# Patient Record
Sex: FEMALE | NOT HISPANIC OR LATINO | Employment: UNEMPLOYED | URBAN - METROPOLITAN AREA
[De-identification: names, ages, dates, MRNs, and addresses within clinical notes are randomized per-mention and may not be internally consistent; named-entity substitution may affect disease eponyms.]

---

## 2019-05-14 ENCOUNTER — OFFICE VISIT (OUTPATIENT)
Dept: FAMILY MEDICINE | Facility: CLINIC | Age: 39
End: 2019-05-14
Payer: COMMERCIAL

## 2019-05-14 VITALS
HEART RATE: 88 BPM | HEIGHT: 64 IN | WEIGHT: 130 LBS | BODY MASS INDEX: 22.2 KG/M2 | SYSTOLIC BLOOD PRESSURE: 126 MMHG | DIASTOLIC BLOOD PRESSURE: 74 MMHG | TEMPERATURE: 99.2 F

## 2019-05-14 DIAGNOSIS — M54.2 NECK PAIN: Primary | ICD-10-CM

## 2019-05-14 PROCEDURE — 99203 OFFICE O/P NEW LOW 30 MIN: CPT | Performed by: PHYSICIAN ASSISTANT

## 2019-05-14 ASSESSMENT — MIFFLIN-ST. JEOR: SCORE: 1246.74

## 2019-05-14 NOTE — PROGRESS NOTES
SUBJECTIVE:   Alistair Huggins is a 38 year old female who presents to clinic today for the following   health issues:    Just moved from [place] to [place] about 8-9 months ago.   is from here.    Neck Pain  Onset: 1 day  Ran [employer] race, was carrying son abnormally, lots of factors that likely contributed to pain in her neck.  Was getting a massage and felt deep pain into her chest, this was yesterday.  Today, her pain is much improved and she is much less worried about this.    Description:   Location: right side  Radiation: into the right shoulder.   Today, sharp pain into her shoulder-feels like normal muscular soreness    Intensity: mild    Progression of Symptoms:  improving    Accompanying Signs & Symptoms:  Burning, prickly sensation (paresthesias) in arm(s): no   Numbness in arm(s): no   Weakness in arm(s):  no   Fever: no   Headache: no   Nausea and/or vomiting: no     History:   Trauma: no   Previous neck pain: YES- after weight training  Previous surgery or injections: no   Previous Imaging (MRI,X ray): no     Precipitating factors:   Does movement increase the pain:  no     Alleviating factors:  Massage and movement    Therapies Tried and outcome:  Massage and movement have helped.  Hasn't felt the need to do any other treatments thus far.    -------------------------------------    Additional history: as documented    Reviewed  and updated as needed this visit by clinical staff  Tobacco  Allergies  Meds  Med Hx  Surg Hx  Fam Hx  Soc Hx        Reviewed and updated as needed this visit by Provider       ROS:  Constitutional, HEENT, cardiovascular, pulmonary, GI, , musculoskeletal, neuro, skin, endocrine and psych systems are negative, except as otherwise noted.    OBJECTIVE:  GENERAL APPEARANCE: healthy, alert and no distress  NECK: no adenopathy, no asymmetry, masses, or scars and thyroid normal to palpation  RESP: lungs clear to auscultation - no rales, rhonchi or wheezes  CV: regular  rates and rhythm, normal S1 S2, no S3 or S4 and no murmur, click or rub -  MS: extremities normal- no gross deformities noted, no evidence of inflammation in joints, FROM in all extremities. Mild pain with palpation of R cervical and thoracic paraspinous muscules, trapezius.  Full ROM of neck and shoulder with mild discomfort.    Assessment & Plan   1. Neck pain  Recommended conservative treatment with rest, ice, nsaids and ROM/stretching exercises. RTC if symptoms worsen or do not continue to improve as anticipated.  Pt understood and agreed with plan.    Patient Instructions   Let me know if the neck/upper back pain doesn't improve.  So nice to meet you!    KENYA Shepherd PA-C  [facility]

## 2019-05-14 NOTE — PATIENT INSTRUCTIONS
Let me know if the neck/upper back pain doesn't improve.  So nice to meet you!    Jewels Ahmadi PA-C

## 2021-12-20 ENCOUNTER — NURSE TRIAGE (OUTPATIENT)
Dept: FAMILY MEDICINE | Facility: CLINIC | Age: 41
End: 2021-12-20
Payer: COMMERCIAL

## 2021-12-20 NOTE — TELEPHONE ENCOUNTER
"Disposition: go to ED today to rule out DVT     calling stating his wife is having bruising that has gotten larger and firm on the back of her leg.  Will need more direct information from pt.  He asks that we call her at her work place.    Deep bruise about a week- left leg behind knee hard lump in center, sore to touch  Reason for Disposition    Thigh or calf pain in only one leg and present > 1 hour    Additional Information    Negative: Followed a hip injury    Negative: Followed a knee injury    Negative: Followed an ankle or foot injury    Negative: Back pain radiating (shooting) into leg(s)    Negative: Foot pain is the main symptom    Negative: Ankle pain is the main symptom    Negative: Knee pain is the main symptom    Negative: Leg swelling is the main symptom    Negative: Chest pain    Negative: Difficulty breathing    Negative: Entire foot is cool or blue in comparison to other side    Negative: Unable to walk    Negative: Fever and red area (or area very tender to touch)    Negative: Fever and swollen joint    Answer Assessment - Initial Assessment Questions  1. ONSET: \"When did the pain start?\"       About a week ago  2. LOCATION: \"Where is the pain located?\"      Left leg behind knee  3. PAIN: \"How bad is the pain?\"    (Scale 1-10; or mild, moderate, severe)    -  MILD (1-3): doesn't interfere with normal activities     -  MODERATE (4-7): interferes with normal activities (e.g., work or school) or awakens from sleep, limping     -  SEVERE (8-10): excruciating pain, unable to do any normal activities, unable to walk     moderate  4. WORK OR EXERCISE: \"Has there been any recent work or exercise that involved this part of the body?\"       No sure but doesn't think so  5. CAUSE: \"What do you think is causing the leg pain?\"      unsure  6. OTHER SYMPTOMS: \"Do you have any other symptoms?\" (e.g., chest pain, back pain, breathing difficulty, swelling, rash, fever, numbness, weakness)      Nothing " "else  7. PREGNANCY: \"Is there any chance you are pregnant?\" \"When was your last menstrual period?\"     no    Protocols used: LEG PAIN-A-TRICIA Khan RN    Triage Nurse  [facility]  Appointment line: [phone]  [facility], 24 hour nurse line, available by calling clinic at [phone] and following prompts.         "

## 2022-01-05 ENCOUNTER — IMMUNIZATION (OUTPATIENT)
Dept: NURSING | Facility: CLINIC | Age: 42
End: 2022-01-05
Payer: COMMERCIAL

## 2022-01-05 PROCEDURE — 0064A COVID-19,PF,MODERNA (18+ YRS BOOSTER .25ML): CPT

## 2022-01-05 PROCEDURE — 91306 COVID-19,PF,MODERNA (18+ YRS BOOSTER .25ML): CPT

## 2022-01-27 ENCOUNTER — OFFICE VISIT (OUTPATIENT)
Dept: OBGYN | Facility: CLINIC | Age: 42
End: 2022-01-27
Payer: COMMERCIAL

## 2022-01-27 VITALS
DIASTOLIC BLOOD PRESSURE: 81 MMHG | WEIGHT: 120 LBS | HEIGHT: 64 IN | TEMPERATURE: 97.3 F | BODY MASS INDEX: 20.49 KG/M2 | SYSTOLIC BLOOD PRESSURE: 131 MMHG | HEART RATE: 76 BPM

## 2022-01-27 DIAGNOSIS — Z01.419 ENCOUNTER FOR GYNECOLOGICAL EXAMINATION WITHOUT ABNORMAL FINDING: Primary | ICD-10-CM

## 2022-01-27 DIAGNOSIS — Z12.31 ENCOUNTER FOR SCREENING MAMMOGRAM FOR BREAST CANCER: ICD-10-CM

## 2022-01-27 DIAGNOSIS — Z13.220 LIPID SCREENING: ICD-10-CM

## 2022-01-27 DIAGNOSIS — Z12.4 ENCOUNTER FOR PAPANICOLAOU SMEAR FOR CERVICAL CANCER SCREENING: ICD-10-CM

## 2022-01-27 DIAGNOSIS — Z11.59 ENCOUNTER FOR HEPATITIS C SCREENING TEST FOR LOW RISK PATIENT: ICD-10-CM

## 2022-01-27 DIAGNOSIS — Z13.1 ENCOUNTER FOR SCREENING EXAMINATION FOR IMPAIRED GLUCOSE REGULATION AND DIABETES MELLITUS: ICD-10-CM

## 2022-01-27 DIAGNOSIS — Z11.4 SCREENING FOR HIV (HUMAN IMMUNODEFICIENCY VIRUS): ICD-10-CM

## 2022-01-27 PROCEDURE — G0145 SCR C/V CYTO,THINLAYER,RESCR: HCPCS | Performed by: OBSTETRICS & GYNECOLOGY

## 2022-01-27 PROCEDURE — 99386 PREV VISIT NEW AGE 40-64: CPT | Performed by: OBSTETRICS & GYNECOLOGY

## 2022-01-27 PROCEDURE — 87624 HPV HI-RISK TYP POOLED RSLT: CPT | Performed by: OBSTETRICS & GYNECOLOGY

## 2022-01-27 ASSESSMENT — MIFFLIN-ST. JEOR: SCORE: 1194.32

## 2022-01-27 NOTE — PROGRESS NOTES
Alistair is a 41 year old No obstetric history on file. female who presents for annual exam.     Menses are regular q 28-30 days and normal lasting 7 days.  Menses flow: normal.  Patient's last menstrual period was 2022.. Using none for contraception.  She is not currently considering pregnancy.  Besides routine health maintenance, she has no other health concerns today .  GYNECOLOGIC HISTORY:  Menarche: 15  Age at first intercourse: 19 Number of lifetime partners: 25  Alistair is sexually active with 1male partner(s) and is currently in monogamous relationship .    History sexually transmitted infections:No STD history  STI testing offered?  Declined  DARCY exposure: No  History of abnormal Pap smear: NO - age 30- 65 PAP every 3 years recommended  Family history of breast CA: No  Family history of uterine/ovarian CA: No    Family history of colon CA: No    HEALTH MAINTENANCE:  Cholesterol: (No results found for: CHOL History of abnormal lipids: No  Mammo: no . History of abnormal Mammo: No.  Regular Self Breast Exams: occ  Calcium/Vitamin D intake: source:  dairy, veggies Adequate? Yes  TSH: (No results found for: TSH )  Pap; (No results found for: PAP )    HISTORY:  OB History    Para Term  AB Living   2 0 0 0 0 2   SAB IAB Ectopic Multiple Live Births   0 0 0 0 0      # Outcome Date GA Lbr Parmjit/2nd Weight Sex Delivery Anes PTL Lv   2             1               History reviewed. No pertinent past medical history.  History reviewed. No pertinent surgical history.  Family History   Problem Relation Age of Onset     Kidney Disease Mother 50        kidney failure     Diabetes Father      Cerebrovascular Disease Paternal Grandmother      Social History     Socioeconomic History     Marital status:      Spouse name: Not on file     Number of children: Not on file     Years of education: Not on file     Highest education level: Not on file   Occupational History     Not on file  "  Tobacco Use     Smoking status: Never Smoker     Smokeless tobacco: Never Used   Substance and Sexual Activity     Alcohol use: Yes     Drug use: Never     Sexual activity: Yes     Partners: Male   Other Topics Concern     Not on file   Social History Narrative     Not on file     Social Determinants of Health     Financial Resource Strain: Not on file   Food Insecurity: Not on file   Transportation Needs: Not on file   Physical Activity: Not on file   Stress: Not on file   Social Connections: Not on file   Intimate Partner Violence: Not on file   Housing Stability: Not on file     No current outpatient medications on file.   No Known Allergies    Past medical, surgical, social and family history were reviewed and updated in EPIC.    ROS:   C:     NEGATIVE for fever, chills, change in weight  I:       NEGATIVE for worrisome rashes, moles or lesions  E:     NEGATIVE for vision changes or irritation  E/M: NEGATIVE for ear, mouth and throat problems  R:     NEGATIVE for significant cough or SOB  CV:   NEGATIVE for chest pain, palpitations or peripheral edema  GI:     NEGATIVE for nausea, abdominal pain, heartburn, or change in bowel habits  :   NEGATIVE for frequency, dysuria, hematuria, vaginal discharge, or irregular bleeding  M:     NEGATIVE for significant arthralgias or myalgia  N:      NEGATIVE for weakness, dizziness or paresthesias  E:      NEGATIVE for temperature intolerance, skin/hair changes  P:      NEGATIVE for changes in mood or affect.    EXAM:  /81   Pulse 76   Temp 97.3  F (36.3  C) (Oral)   Ht 1.626 m (5' 4\")   Wt 54.4 kg (120 lb)   LMP 01/07/2022   BMI 20.60 kg/m     BMI: Body mass index is 20.6 kg/m .  Constitutional: healthy, alert and no distress  Head: Normocephalic. No masses, lesions, tenderness or abnormalities  Neck: Neck supple. Trachea midline. No adenopathy. Thyroid symmetric, normal size.   Cardiovascular: RRR.   Respiratory: Negative.   Breast: Breasts reveal mild " symmetric fibrocystic densities, but there are no dominant, discrete, fixed or suspicious masses found.  Gastrointestinal: Abdomen soft, non-tender, non-distended. No masses, organomegaly.  :  Vulva:  No external lesions, normal female hair distribution, no inguinal adenopathy.    Urethra:  Midline, non-tender, well supported, no discharge  Vagina:  Moist, pink, no abnormal discharge, no lesions  Uterus:  Normal size, retained tampon removed, non-tender, freely mobile  Ovaries:  No masses appreciated, non-tender, mobile  Rectal Exam: deferred  Musculoskeletal: extremities normal  Skin: no suspicious lesions or rashes  Psychiatric: Affect appropriate, cooperative,mentation appears normal.     COUNSELING:   Reviewed preventive health counseling, as reflected in patient instructions       Regular exercise       Consider Hep C screening for all patients one time for ages 18-79 years       HIV screeninx in teen years, 1x in adult years, and at intervals if high risk   reports that she has never smoked. She has never used smokeless tobacco.    Body mass index is 20.6 kg/m .    FRAX Risk Assessment    ASSESSMENT:  41 year old female with satisfactory annual exam  (Z01.419) Encounter for gynecological examination without abnormal finding  (primary encounter diagnosis)  Comment:   Plan: previous healthcare in [place]    (Z12.31) Encounter for screening mammogram for breast cancer  Comment:   Plan: MA Screen Bilateral w/Arsen            (Z13.220) Lipid screening  Comment:   Plan: Lipid Profile (Chol, Trig, HDL, LDL calc)            (Z13.1) Encounter for screening examination for impaired glucose regulation and diabetes mellitus  Comment:   Plan: Glucose            (Z11.59) Encounter for hepatitis C screening test for low risk patient  Comment:   Plan: Hepatitis C Screen Reflex to HCV RNA Quant and         Genotype            (Z11.4) Screening for HIV (human immunodeficiency virus)  Comment:   Plan: HIV Antigen Antibody  Combo            (Z12.4) Encounter for Papanicolaou smear for cervical cancer screening  Comment:   Plan: Pap imaged thin layer screen with HPV -         recommended age 30 - 65 years (select HPV order        below), HPV Hold (Lab Only), HPV High Risk         Types DNA Cervical

## 2022-01-31 LAB
BKR LAB AP GYN ADEQUACY: NORMAL
BKR LAB AP GYN INTERPRETATION: NORMAL
BKR LAB AP HPV REFLEX: NORMAL
BKR LAB AP LMP: NORMAL
BKR LAB AP PREVIOUS ABNORMAL: NORMAL
PATH REPORT.COMMENTS IMP SPEC: NORMAL
PATH REPORT.COMMENTS IMP SPEC: NORMAL
PATH REPORT.RELEVANT HX SPEC: NORMAL

## 2022-02-02 LAB
HUMAN PAPILLOMA VIRUS 16 DNA: NEGATIVE
HUMAN PAPILLOMA VIRUS 18 DNA: NEGATIVE
HUMAN PAPILLOMA VIRUS FINAL DIAGNOSIS: NORMAL
HUMAN PAPILLOMA VIRUS OTHER HR: NEGATIVE

## 2022-02-08 ENCOUNTER — ANCILLARY PROCEDURE (OUTPATIENT)
Dept: MAMMOGRAPHY | Facility: CLINIC | Age: 42
End: 2022-02-08
Attending: OBSTETRICS & GYNECOLOGY
Payer: COMMERCIAL

## 2022-02-08 DIAGNOSIS — Z12.31 ENCOUNTER FOR SCREENING MAMMOGRAM FOR BREAST CANCER: ICD-10-CM

## 2022-02-08 PROCEDURE — 77067 SCR MAMMO BI INCL CAD: CPT | Mod: TC | Performed by: RADIOLOGY

## 2022-02-08 PROCEDURE — 77063 BREAST TOMOSYNTHESIS BI: CPT | Mod: TC | Performed by: RADIOLOGY

## 2022-02-23 ENCOUNTER — ANCILLARY PROCEDURE (OUTPATIENT)
Dept: MAMMOGRAPHY | Facility: CLINIC | Age: 42
End: 2022-02-23
Attending: OBSTETRICS & GYNECOLOGY
Payer: COMMERCIAL

## 2022-02-23 ENCOUNTER — ANCILLARY PROCEDURE (OUTPATIENT)
Dept: ULTRASOUND IMAGING | Facility: CLINIC | Age: 42
End: 2022-02-23
Attending: OBSTETRICS & GYNECOLOGY
Payer: COMMERCIAL

## 2022-02-23 DIAGNOSIS — R92.8 ABNORMAL MAMMOGRAM: ICD-10-CM

## 2022-02-23 PROCEDURE — 76642 ULTRASOUND BREAST LIMITED: CPT | Mod: LT | Performed by: RADIOLOGY

## 2022-02-23 PROCEDURE — 77065 DX MAMMO INCL CAD UNI: CPT | Mod: LT | Performed by: RADIOLOGY

## 2022-02-23 PROCEDURE — G0279 TOMOSYNTHESIS, MAMMO: HCPCS | Performed by: RADIOLOGY

## 2022-03-04 ENCOUNTER — ANCILLARY PROCEDURE (OUTPATIENT)
Dept: MAMMOGRAPHY | Facility: CLINIC | Age: 42
End: 2022-03-04
Attending: OBSTETRICS & GYNECOLOGY
Payer: COMMERCIAL

## 2022-03-04 DIAGNOSIS — R92.8 ABNORMAL MAMMOGRAM: ICD-10-CM

## 2022-03-04 DIAGNOSIS — N63.0 BREAST MASS: ICD-10-CM

## 2022-03-04 PROCEDURE — 88341 IMHCHEM/IMCYTCHM EA ADD ANTB: CPT | Mod: 26 | Performed by: PATHOLOGY

## 2022-03-04 PROCEDURE — 88342 IMHCHEM/IMCYTCHM 1ST ANTB: CPT | Mod: 26 | Performed by: PATHOLOGY

## 2022-03-04 PROCEDURE — 88305 TISSUE EXAM BY PATHOLOGIST: CPT | Mod: TC | Performed by: OBSTETRICS & GYNECOLOGY

## 2022-03-04 PROCEDURE — 88305 TISSUE EXAM BY PATHOLOGIST: CPT | Mod: 26 | Performed by: PATHOLOGY

## 2022-03-04 PROCEDURE — 76642 ULTRASOUND BREAST LIMITED: CPT | Mod: RT | Performed by: RADIOLOGY

## 2022-03-04 PROCEDURE — 19083 BX BREAST 1ST LESION US IMAG: CPT | Mod: RT | Performed by: RADIOLOGY

## 2022-03-04 PROCEDURE — G0279 TOMOSYNTHESIS, MAMMO: HCPCS | Mod: GC | Performed by: RADIOLOGY

## 2022-03-04 PROCEDURE — 77066 DX MAMMO INCL CAD BI: CPT | Mod: GC | Performed by: RADIOLOGY

## 2022-03-04 PROCEDURE — 19081 BX BREAST 1ST LESION STRTCTC: CPT | Mod: LT | Performed by: RADIOLOGY

## 2022-03-04 PROCEDURE — 88360 TUMOR IMMUNOHISTOCHEM/MANUAL: CPT | Mod: 26 | Performed by: PATHOLOGY

## 2022-03-04 RX ORDER — IOPAMIDOL 612 MG/ML
100 INJECTION, SOLUTION INTRAVASCULAR ONCE
Status: COMPLETED | OUTPATIENT
Start: 2022-03-04 | End: 2022-03-04

## 2022-03-04 RX ORDER — LIDOCAINE HYDROCHLORIDE AND EPINEPHRINE 10; 10 MG/ML; UG/ML
10 INJECTION, SOLUTION INFILTRATION; PERINEURAL ONCE
Status: COMPLETED | OUTPATIENT
Start: 2022-03-04 | End: 2022-03-04

## 2022-03-04 RX ADMIN — LIDOCAINE HYDROCHLORIDE AND EPINEPHRINE 10 ML: 10; 10 INJECTION, SOLUTION INFILTRATION; PERINEURAL at 09:47

## 2022-03-04 RX ADMIN — IOPAMIDOL 81 ML: 612 INJECTION, SOLUTION INTRAVASCULAR at 09:05

## 2022-03-08 ENCOUNTER — PATIENT OUTREACH (OUTPATIENT)
Dept: ONCOLOGY | Facility: CLINIC | Age: 42
End: 2022-03-08
Payer: COMMERCIAL

## 2022-03-08 ENCOUNTER — TELEPHONE (OUTPATIENT)
Dept: MAMMOGRAPHY | Facility: CLINIC | Age: 42
End: 2022-03-08
Payer: COMMERCIAL

## 2022-03-08 DIAGNOSIS — N64.89 RADIAL SCAR OF RIGHT BREAST: Primary | ICD-10-CM

## 2022-03-08 LAB
PATH REPORT.COMMENTS IMP SPEC: NORMAL
PATH REPORT.FINAL DX SPEC: NORMAL
PATH REPORT.GROSS SPEC: NORMAL
PATH REPORT.MICROSCOPIC SPEC OTHER STN: NORMAL
PATH REPORT.RELEVANT HX SPEC: NORMAL
PHOTO IMAGE: NORMAL

## 2022-03-08 NOTE — TELEPHONE ENCOUNTER
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  We discussed the Radiologist's recommendation of surgical consultation to discuss what to do next about the radial scar in her right breast.  A referral has been placed in her chart and someone from their office will reach out to help coordinate the appointments.  Alistair verbalized understanding and all questions and concerns were answered at this time.

## 2022-03-08 NOTE — PROGRESS NOTES
New Patient Oncology Nurse Navigator Note     Referring provider: Erin Taylor MD     Referring Clinic/Organization:  in [facility]     Referred to (specialty:) Breast surgery     Date Referral Received: March 8, 2022     Evaluation for: Right breast radial scar     Clinical History (per Nurse review of records provided):    Alistair had a bilateral screening mammogram on 2/8/22 and a possible mass in the left breast central core, middle depth was identified. A left breast diagnostic mammogram and ultrasound followed on 2/23 and in the left central breast at middle depth, there is an oval  circumscribed mass measuring 8 mm. Targeted, real-time ultrasound evaluation showed, in the left breast at 12:00, 1 cm and the nipple, there is an oval hypoechoic mass measuring 8 x 6 x 8 mm with possible irregular margins.    On 3/4 a contrast enhanced mammogram with US breast was performed. Mammogram: Mild background parenchymal enhancement. The left breast mass in the central breast at middle depth mildly enhances and measures 1.1 x 1 x 0.8 cm. In the right breast upper outer quadrant at anterior to middle depth, there is subtle asymmetric nonmass enhancement. Targeted, real-time ultrasound was used to evaluate the right superior breast between 9-2 o'clock. In the right breast,12:00 postilion, 4 cm from the nipple there is an irregular hypoechoic mass with angular margins measures 0.9 x 0.4 x 0.4 cm. At the superior aspect of the mass is a cyst, it either abuts or is in connection with the mass. Normal appearing right axillary lymph nodes.     3/4/22 - A. BREAST, LEFT, CENTRAL MIDDLE POSITION, STEREOTACTIC CORE BIOPSY:  - Fibroadenoma  - Negative for atypia or malignancy  B. BREAST, RIGHT, 12:00, 4 CM FROM NIPPLE, ULTRASOUND GUIDED CORE BIOPSY:  - Radial sclerosing lesion (radial scar) with accompanying adenosis, usual ductal hyperplasia and columnar cell  change  - Calcifications associated with  benign epithelium/usual ductal hyperplasia (UDH)  - Negative for atypia or malignancy      Records Location: See Bookmarked material     Writer received referral, reviewed for appropriate plan, and sent to New Patient Scheduling for completion.

## 2022-03-26 ENCOUNTER — HEALTH MAINTENANCE LETTER (OUTPATIENT)
Age: 42
End: 2022-03-26

## 2022-03-28 ENCOUNTER — OFFICE VISIT (OUTPATIENT)
Dept: FAMILY MEDICINE | Facility: CLINIC | Age: 42
End: 2022-03-28
Payer: COMMERCIAL

## 2022-03-28 VITALS
BODY MASS INDEX: 19.29 KG/M2 | HEIGHT: 64 IN | SYSTOLIC BLOOD PRESSURE: 118 MMHG | HEART RATE: 86 BPM | OXYGEN SATURATION: 97 % | DIASTOLIC BLOOD PRESSURE: 74 MMHG | WEIGHT: 113 LBS

## 2022-03-28 DIAGNOSIS — Z11.4 SCREENING FOR HIV (HUMAN IMMUNODEFICIENCY VIRUS): ICD-10-CM

## 2022-03-28 DIAGNOSIS — Z13.220 LIPID SCREENING: ICD-10-CM

## 2022-03-28 DIAGNOSIS — Z11.59 ENCOUNTER FOR HEPATITIS C SCREENING TEST FOR LOW RISK PATIENT: ICD-10-CM

## 2022-03-28 DIAGNOSIS — Z13.1 ENCOUNTER FOR SCREENING EXAMINATION FOR IMPAIRED GLUCOSE REGULATION AND DIABETES MELLITUS: ICD-10-CM

## 2022-03-28 DIAGNOSIS — Z00.00 ROUTINE GENERAL MEDICAL EXAMINATION AT A HEALTH CARE FACILITY: Primary | ICD-10-CM

## 2022-03-28 DIAGNOSIS — Z23 NEED FOR IMMUNIZATION WITH DIPHTHERIA, TETANUS, AND POLIOVIRUS VACCINE: ICD-10-CM

## 2022-03-28 DIAGNOSIS — L30.9 ECZEMA, UNSPECIFIED TYPE: ICD-10-CM

## 2022-03-28 LAB
HCV AB SERPL QL IA: NONREACTIVE
HIV 1+2 AB+HIV1 P24 AG SERPL QL IA: NONREACTIVE

## 2022-03-28 PROCEDURE — 86803 HEPATITIS C AB TEST: CPT | Performed by: STUDENT IN AN ORGANIZED HEALTH CARE EDUCATION/TRAINING PROGRAM

## 2022-03-28 PROCEDURE — 90471 IMMUNIZATION ADMIN: CPT | Performed by: STUDENT IN AN ORGANIZED HEALTH CARE EDUCATION/TRAINING PROGRAM

## 2022-03-28 PROCEDURE — 80061 LIPID PANEL: CPT | Performed by: STUDENT IN AN ORGANIZED HEALTH CARE EDUCATION/TRAINING PROGRAM

## 2022-03-28 PROCEDURE — 87389 HIV-1 AG W/HIV-1&-2 AB AG IA: CPT | Performed by: STUDENT IN AN ORGANIZED HEALTH CARE EDUCATION/TRAINING PROGRAM

## 2022-03-28 PROCEDURE — 90715 TDAP VACCINE 7 YRS/> IM: CPT | Performed by: STUDENT IN AN ORGANIZED HEALTH CARE EDUCATION/TRAINING PROGRAM

## 2022-03-28 PROCEDURE — 82947 ASSAY GLUCOSE BLOOD QUANT: CPT | Performed by: STUDENT IN AN ORGANIZED HEALTH CARE EDUCATION/TRAINING PROGRAM

## 2022-03-28 PROCEDURE — 99386 PREV VISIT NEW AGE 40-64: CPT | Mod: 25 | Performed by: STUDENT IN AN ORGANIZED HEALTH CARE EDUCATION/TRAINING PROGRAM

## 2022-03-28 PROCEDURE — 36415 COLL VENOUS BLD VENIPUNCTURE: CPT | Performed by: STUDENT IN AN ORGANIZED HEALTH CARE EDUCATION/TRAINING PROGRAM

## 2022-03-28 RX ORDER — TRIAMCINOLONE ACETONIDE 1 MG/G
CREAM TOPICAL 2 TIMES DAILY
Qty: 45 G | Refills: 1 | Status: SHIPPED | OUTPATIENT
Start: 2022-03-28 | End: 2023-03-31

## 2022-03-28 ASSESSMENT — ENCOUNTER SYMPTOMS
JOINT SWELLING: 0
CHILLS: 0
SHORTNESS OF BREATH: 0
FEVER: 0
ARTHRALGIAS: 0
HEMATURIA: 0
NAUSEA: 0
SORE THROAT: 0
HEARTBURN: 0
CONSTIPATION: 0
FREQUENCY: 0
HEMATOCHEZIA: 0
MYALGIAS: 0
WEAKNESS: 0
DYSURIA: 0
DIZZINESS: 0
COUGH: 0
DIARRHEA: 0
PALPITATIONS: 0
HEADACHES: 0
ABDOMINAL PAIN: 0
PARESTHESIAS: 0
NERVOUS/ANXIOUS: 0
EYE PAIN: 0

## 2022-03-28 NOTE — PROGRESS NOTES
SUBJECTIVE:   CC: Alistair Huggins is an 41 year old woman who presents for preventive health visit.       Patient has been advised of split billing requirements and indicates understanding: Yes  Healthy Habits:     Getting at least 3 servings of Calcium per day:  Yes    Bi-annual eye exam:  NO    Dental care twice a year:  Yes    Sleep apnea or symptoms of sleep apnea:  None    Diet:  Regular (no restrictions)    Frequency of exercise:  2-3 days/week    Duration of exercise:  45-60 minutes    Taking medications regularly:  Yes    Medication side effects:  Not applicable    PHQ-2 Total Score: 0    Additional concerns today:  No      Moved from [place] 3 years  - wants to set up a PCP     Does see OBGYN, Dr Taylor   -> had pap 1/2022  -> mammogram: Abnormal mammogram recently 2/8/22 - referred due to density on right breast scar. Referred to oncology, note in chart that they were unable to get ahold of her.     .  has vasectomy.     2 kids, vaginal - 10 yo and 12yo    Father - diabetic    Former smoker - quit 4 years, smoked for 22 years. 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 [employer]olve [place] [employer]     Cooks at home. Eats well, healthy        Today's PHQ-2 Score:   PHQ-2 ( 1999 Pfizer) 3/28/2022   Q1: Little interest or pleasure in doing things 0   Q2: Feeling down, depressed or hopeless 0   PHQ-2 Score 0   Q1: Little interest or pleasure in doing things Not at all   Q2: Feeling down, depressed or hopeless Not at all   PHQ-2 Score 0       Abuse: Current or Past (Physical, Sexual or Emotional) - No  Do you feel safe in your environment? Yes    Have you ever done Advance Care Planning? (For example, a Health Directive, POLST, or a discussion with a medical provider or your loved ones about your wishes): No, advance care planning information given to patient to review.  Patient declined advance care planning discussion at this time.    Social  History     Tobacco Use     Smoking status: Never Smoker     Smokeless tobacco: Never Used   Substance Use Topics     Alcohol use: Yes     Comment: 4-5      If you drink alcohol do you typically have >3 drinks per day or >7 drinks per week? No    Alcohol Use 3/28/2022   Prescreen: >3 drinks/day or >7 drinks/week? No   Prescreen: >3 drinks/day or >7 drinks/week? -       Reviewed orders with patient.  Reviewed health maintenance and updated orders accordingly - Yes  Lab work is in process    Breast Cancer Screening:  Any new diagnosis of family breast, ovarian, or bowel cancer? No    FHS-7:   Breast CA Risk Assessment (FHS-7) 2/8/2022 2/23/2022   Did any of your first-degree relatives have breast or ovarian cancer? No No   Did any of your relatives have bilateral breast cancer? No No   Did any man in your family have breast cancer? No No   Did any woman in your family have breast and ovarian cancer? No No   Did any woman in your family have breast cancer before age 50 y? No No   Do you have 2 or more relatives with breast and/or ovarian cancer? No No   Do you have 2 or more relatives with breast and/or bowel cancer? No No     click delete button to remove this line now  Mammogram Screening - Offered annual screening and updated Health Maintenance for mutual plan based on risk factor consideration    Pertinent mammograms are reviewed under the imaging tab.    History of abnormal Pap smear:   NO - age 30-65 PAP every 5 years with negative HPV co-testing recommended  Last 3 Pap and HPV Results:   PAP / HPV Latest Ref Rng & Units 1/27/2022   PAP   Negative for Intraepithelial Lesion or Malignancy (NILM)   HPV16 Negative Negative   HPV18 Negative Negative   HRHPV Negative Negative     PAP / HPV Latest Ref Rng & Units 1/27/2022   PAP   Negative for Intraepithelial Lesion or Malignancy (NILM)   HPV16 Negative Negative   HPV18 Negative Negative   HRHPV Negative Negative     Reviewed and updated as needed this visit by  "clinical staff   Tobacco  Allergies  Meds   Med Hx  Surg Hx  Fam Hx  Soc Hx        Reviewed and updated as needed this visit by Provider                 History reviewed. No pertinent past medical history.   History reviewed. No pertinent surgical history.  OB History    Para Term  AB Living   2 0 0 0 0 2   SAB IAB Ectopic Multiple Live Births   0 0 0 0 0      # Outcome Date GA Lbr Parmjit/2nd Weight Sex Delivery Anes PTL Lv   2             1                 Review of Systems   Constitutional: Negative for chills and fever.   HENT: Negative for congestion, ear pain, hearing loss and sore throat.    Eyes: Negative for pain and visual disturbance.   Respiratory: Negative for cough and shortness of breath.    Cardiovascular: Negative for chest pain, palpitations and peripheral edema.   Gastrointestinal: Negative for abdominal pain, constipation, diarrhea, heartburn, hematochezia and nausea.   Genitourinary: Negative for dysuria, frequency, genital sores, hematuria and urgency.   Musculoskeletal: Negative for arthralgias, joint swelling and myalgias.   Skin: Negative for rash.   Neurological: Negative for dizziness, weakness, headaches and paresthesias.   Psychiatric/Behavioral: Negative for mood changes. The patient is not nervous/anxious.         OBJECTIVE:   /74 (BP Location: Right arm, Patient Position: Sitting, Cuff Size: Adult Regular)   Pulse 86   Ht 1.626 m (5' 4\")   Wt 51.3 kg (113 lb)   SpO2 97%   BMI 19.40 kg/m    Physical Exam  GENERAL: healthy, alert and no distress  EYES: Eyes grossly normal to inspection, PERRL and conjunctivae and sclerae normal  HENT: ear canals and TM's normal, nose and mouth without ulcers or lesions  NECK: no adenopathy, no asymmetry, masses, or scars and thyroid normal to palpation  RESP: lungs clear to auscultation - no rales, rhonchi or wheezes  CV: regular rate and rhythm, normal S1 S2, no S3 or S4, no murmur, click or rub, no peripheral " "edema and peripheral pulses strong  ABDOMEN: soft, nontender, no hepatosplenomegaly, no masses and bowel sounds normal  MS: no gross musculoskeletal defects noted, no edema  SKIN: no suspicious lesions or rashes  NEURO: Normal strength and tone, mentation intact and speech normal  PSYCH: mentation appears normal, affect normal/bright    Diagnostic Test Results:  Labs reviewed in Epic    ASSESSMENT/PLAN:   (Z00.00) Routine general medical examination at a health care facility  (primary encounter diagnosis)  Comment: normal vitals and BMI. Updating immunizations. Pap UTD 1/2022. Mammogram abnormal - referred to oncology due to scarring right breast, they haven't been able to get ahold of her; gave her their number to schedule   Plan: REVIEW OF HEALTH MAINTENANCE PROTOCOL ORDERS            (Z23) Need for immunization with diphtheria, tetanus, and poliovirus vaccine  Plan: TDAP VACCINE (Adacel, Boostrix)  [[id number]]            (L30.9) Eczema, unspecified type  Comment: continue using non-scented moisturizing cream BID and use steroid cream sparingly.   Plan: triamcinolone (KENALOG) 0.1 % external cream              (Z13.1) Encounter for screening examination for impaired glucose regulation and diabetes mellitus  Comment: labs pending from exam with obgyn recently     (Z13.220) Lipid screening  Comment: labs pending     Patient has been advised of split billing requirements and indicates understanding: Yes    COUNSELING:  Reviewed preventive health counseling, as reflected in patient instructions       Regular exercise       Healthy diet/nutrition       Alcohol Use       Contraception    Estimated body mass index is 19.4 kg/m  as calculated from the following:    Height as of this encounter: 1.626 m (5' 4\").    Weight as of this encounter: 51.3 kg (113 lb).        She reports that she has never smoked. She has never used smokeless tobacco.      Counseling Resources:  ATP IV Guidelines  Pooled Cohorts Equation " Calculator  Breast Cancer Risk Calculator  BRCA-Related Cancer Risk Assessment: FHS-7 Tool  FRAX Risk Assessment  ICSI Preventive Guidelines  Dietary Guidelines for Americans, 2010  USDA's MyPlate  ASA Prophylaxis  Lung CA Screening    DO RAY[facility]

## 2022-03-28 NOTE — PATIENT INSTRUCTIONS
Call to schedule oncology Dept: [phone] or [phone]      Preventive Health Recommendations  Female Ages 40 to 49    Yearly exam:     See your health care provider every year in order to  1. Review health changes.   2. Discuss preventive care.    3. Review your medicines if your doctor prescribed any.      Get a Pap test every three years (unless you have an abnormal result and your provider advises testing more often).      If you get Pap tests with HPV test, you only need to test every 5 years, unless you have an abnormal result. You do not need a Pap test if your uterus was removed (hysterectomy) and you have not had cancer.      You should be tested each year for STDs (sexually transmitted diseases), if you're at risk.     Ask your doctor if you should have a mammogram.      Have a colonoscopy (test for colon cancer) if someone in your family has had colon cancer or polyps before age 50.       Have a cholesterol test every 5 years.       Have a diabetes test (fasting glucose) after age 45. If you are at risk for diabetes, you should have this test every 3 years.    Shots: Get a flu shot each year. Get a tetanus shot every 10 years.     Nutrition:     Eat at least 5 servings of fruits and vegetables each day.    Eat whole-grain bread, whole-wheat pasta and brown rice instead of white grains and rice.    Get adequate Calcium and Vitamin D.      Lifestyle    Exercise at least 150 minutes a week (an average of 30 minutes a day, 5 days a week). This will help you control your weight and prevent disease.    Limit alcohol to one drink per day.    No smoking.     Wear sunscreen to prevent skin cancer.    See your dentist every six months for an exam and cleaning.

## 2022-03-29 LAB
CHOLEST SERPL-MCNC: 199 MG/DL
FASTING STATUS PATIENT QL REPORTED: YES
FASTING STATUS PATIENT QL REPORTED: YES
GLUCOSE BLD-MCNC: 90 MG/DL (ref 70–99)
HDLC SERPL-MCNC: 85 MG/DL
LDLC SERPL CALC-MCNC: 96 MG/DL
NONHDLC SERPL-MCNC: 114 MG/DL
TRIGL SERPL-MCNC: 92 MG/DL

## 2022-04-04 NOTE — PROGRESS NOTES
Telephoned and left voice message informing patient we would like to shift her appointment with Dr. Nixon to a date later in the month to accommodate a new cancer patient.  Welcomed call to writer or New Patient Scheduling to finalize a shift.  Appointment will stand as is until we hear from patient. Anna Tran RN

## 2022-04-19 ENCOUNTER — TELEPHONE (OUTPATIENT)
Dept: SURGERY | Facility: CLINIC | Age: 42
End: 2022-04-19
Payer: COMMERCIAL

## 2022-04-19 NOTE — TELEPHONE ENCOUNTER
PREVISIT INFORMATION                                                    Alistair Huggins scheduled for future visit at [facility].    Patient is scheduled to see Dr. Nixon on 4/20/22  Reason for visit: Radial Scar  Referring provider Breast center  Has patient seen previous specialist? No  Medical Records:  Available in chart.  Patient was previously seen at a [facility] or [facility] facility.    REVIEW                                                      New patient packet mailed to patient: No  Medication reconciliation complete: No      Current Outpatient Medications   Medication Sig Dispense Refill     triamcinolone (KENALOG) 0.1 % external cream Apply topically 2 times daily 45 g 1       Allergies: Patient has no known allergies.        PLAN/FOLLOW-UP NEEDED                                                      Previsit review complete.  Patient will see provider at future scheduled appointment.     Patient Reminders Given:  Please, make sure you bring an updated list of your medications.   If you are having a procedure, please, present 15 minutes early.  If you need to cancel or reschedule,please call [phone].    Felicita Rao LPN

## 2022-04-20 ENCOUNTER — OFFICE VISIT (OUTPATIENT)
Dept: SURGERY | Facility: CLINIC | Age: 42
End: 2022-04-20
Attending: OBSTETRICS & GYNECOLOGY
Payer: COMMERCIAL

## 2022-04-20 VITALS — WEIGHT: 112.5 LBS | HEIGHT: 64 IN | BODY MASS INDEX: 19.21 KG/M2

## 2022-04-20 DIAGNOSIS — N64.89 RADIAL SCAR OF RIGHT BREAST: ICD-10-CM

## 2022-04-20 PROCEDURE — 99203 OFFICE O/P NEW LOW 30 MIN: CPT | Performed by: SURGERY

## 2022-04-20 NOTE — LETTER
"    4/20/2022         RE: Alistair Huggins  [address] [address]        Dear Colleague,    Thank you for referring your patient, Alistair Huggins, to the [facility]. Please see a copy of my visit note below.    NEW SURGICAL CONSULTATION  Apr 20, 2022    Alistair Huggins is a 41 year old woman who presents with a right  breast complaint.  She was referred by Erin Taylor MD.    HPI:    She notes no masses in either breast, axilla, or neck. She denies any nipple discharge or nipple inversion.     Imaging showed an 8 mm mass at 12:00 1 cm FN in the LEFT breast and a 9 mm mass at 12:00 4 cm FN in the RIGHT breast.    A biopsy was performed of the LEFT breast mass and a clip was placed.  It showed fibroadenoma.  A biopsy was performed of the RIGHT breast mass and a clip was placed. It showed a radial scar.      BREAST-SPECIFIC HISTORY:  Prior breast surgeries: No  Prior radiation history: No  Bra size: 34 C  Dominant hand: Left    FAMILY HISTORY:  Breast ca: No  Ovarian ca: No  Pancreatic ca: No  Melanoma: No  Gastric ca: No  Colon ca: No  Other cancer: No    No past medical history on file.    No past surgical history on file.  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    ROS:  Easy bruising/bleeding: No  History of DVT/PE: No    Ht 1.626 m (5' 4\")   Wt 51 kg (112 lb 8 oz)   BMI 19.31 kg/m     Physical Exam  Constitutional:       Appearance: She is well-developed.   Chest:   Breasts: Breasts are symmetrical.      Right: No inverted nipple, mass, nipple discharge, skin change, tenderness, axillary adenopathy or supraclavicular adenopathy.      Left: No inverted nipple, mass, nipple " discharge, skin change, tenderness, axillary adenopathy or supraclavicular adenopathy.        Comments: Patient was examined in both supine and upright positions.   Lymphadenopathy:      Cervical: No cervical adenopathy.      Right cervical: No superficial, deep or posterior cervical adenopathy.     Left cervical: No superficial, deep or posterior cervical adenopathy.      Upper Body:      Right upper body: No supraclavicular, axillary or pectoral adenopathy.      Left upper body: No supraclavicular, axillary or pectoral adenopathy.      Comments: No lymphedema in bilateral upper extremities.   Skin:     General: Skin is warm and dry.          INVESTIGATIONS:    Contrast-Enhanced Mammogram (3/4/2022) showed:  Findings:     Mammogram:   Mild background parenchymal enhancement.  The left breast mass in the central breast at middle depth mildly enhances and measures 1.1 x 1 x 0.8 cm.  In the right breast upper outer quadrant at anterior to middle depth, there is subtle asymmetric nonmass enhancement.  Ultrasound:  Targeted, real-time ultrasound evaluation of the right breast was performed by the technologist and radiologist. The right superior breast between 9-2 o'clock was evaluated.  Right breast,12:00 postilion, 4 cm from the nipple there is an irregular hypoechoic mass with angular margins measures 0.9 x 0.4 x 0.4 cm. At the superior aspect of the mass is a cyst, it either abuts or is in connection with the mass. Normal appearing right axillary lymph nodes.   IMPRESSION: BI-RADS CATEGORY: 4 - Suspicious.    Diagnostic Mammogram & Ultrasound (2/23/2022) showed:  FINDINGS:  MAMMOGRAM:  TECHNIQUE: Standard mammographic views were performed with tomosynthesis and synthetic mammogram.  BREAST DENSITY: Heterogeneously dense. In the left central breast at middle depth, there is an oval circumscribed mass measuring 8 mm.  ULTRASOUND:  Targeted, real-time ultrasound evaluation was performed by the technologist and  radiologist.  In the left breast at 12:00, 1 cm and the nipple, there is an oval hypoechoic mass measuring 8 x 6 x 8 mm with possible irregular margins.  IMPRESSION: BI-RADS CATEGORY: 4 - Suspicious.    Screening Mammogram (2/8/2022) showed:  Findings: The breasts are heterogeneously dense, which may obscure small masses.  There is a possible mass in the left breast central core, middle depth.  The remainder of the breast tissue is unremarkable.  There is no suspicious finding of the right breast.  IMPRESSION: ACR BI-RADS Category 0: Need Additional Imaging Evaluation    Biopsy (3/4/2022) showed:  Final Diagnosis   A. BREAST, LEFT, CENTRAL MIDDLE POSITION, STEREOTACTIC CORE BIOPSY:  - Fibroadenoma  - Negative for atypia or malignancy     B. BREAST, RIGHT, 12:00, 4 CM FROM NIPPLE, ULTRASOUND GUIDED CORE BIOPSY:  - Radial sclerosing lesion (radial scar) with accompanying adenosis, usual ductal hyperplasia and columnar cell change  - Calcifications associated with benign epithelium/usual ductal hyperplasia (UDH)  - Negative for atypia or malignancy       ASSESSMENT:  Alistair Huggins is a 41 year old woman with radial scar of the RIGHT breast.    I personally reviewed the imaging above.    I reviewed the imaging and diagnosis with Alistair Huggins.  The natural history of fibroadenoma was discussed with the patient.  Fibroadenoma are benign non-proliferative lesions.  They can be managed expectantly, with a view to re-image and re-biopsy should the lesion change clinically.  Alternatively, for symptom relief, surgical excision in the form of lumpectomy is reasonable as well. She is asymptomatic and no further follow up or treatment is indicated for the LEFT fibroadenoma.     The natural history of radial scars were discussed with the patient.  This is a proliferative benign lesion of the breast.  An excision following the core needle biopsy is recommended for diagnostic purposes because there are a small  percentage of radial scars that are upgraded to non-invasive or invasive cancer following excision.  Because the lesion is not palpable, a wire-localized approach will be taken.  She would present on the day of surgery for an image-guided wire placement, followed by a surgical excision in the operating room.  The risks of a wire-localized lumpectomy were discussed with the patient, including the risks of bleeding, wound infection, wound dehiscence, and post-operative contour change to the breast.  We discussed that surgical pathology results will be reviewed at the postoperative visit to allow for careful discussion of next steps and for answering questions.    Alternatively, watchful waiting with repeat diagnostic imaging in 6 months to determine interval radiographic changes would be reasonable as well for the RIGHT radial scar. We discussed every 6 monthly imaging is typically performed to establish stability for 2 years.    All of the above was discussed with Alistair Chentevargas and all questions were answered.  She elected to proceed with repeat diagnostic imaging in 6 months and will follow up if there is a change.    Total time spent with the patient was 25 minutes, of which 75% was counseling.     PLAN:  1. RIGHT diagnostic mammogram and US in 6 months.  2. Follow up with me PRN.    Beverly Nixon MD MSc [facility] FACS  Assistant Professor of Surgery  Division of Surgical Oncology  [facility]     30 minutes spent on the date of the encounter doing chart review, review of test results, interpretation of tests, patient visit and documentation.      Again, thank you for allowing me to participate in the care of your patient.        Sincerely,        Beverly Nixon MD

## 2022-04-20 NOTE — NURSING NOTE
"Alistair Huggins's goals for this visit include:   Chief Complaint   Patient presents with     Consult     Radial scar       She requests these members of her care team be copied on today's visit information: no    PCP: [facility]    Referring Provider:  Erin Taylor MD  [address]  [place] [address]    Ht 1.626 m (5' 4\")   Wt 51 kg (112 lb 8 oz)   BMI 19.31 kg/m      Do you need any medication refills at today's visit? No    Felicita Rao LPN      "

## 2022-04-20 NOTE — PROGRESS NOTES
"NEW SURGICAL CONSULTATION  Apr 20, 2022    Alistair Huggins is a 41 year old woman who presents with a right  breast complaint.  She was referred by Erin Taylor MD.    HPI:    She notes no masses in either breast, axilla, or neck. She denies any nipple discharge or nipple inversion.     Imaging showed an 8 mm mass at 12:00 1 cm FN in the LEFT breast and a 9 mm mass at 12:00 4 cm FN in the RIGHT breast.    A biopsy was performed of the LEFT breast mass and a clip was placed.  It showed fibroadenoma.  A biopsy was performed of the RIGHT breast mass and a clip was placed. It showed a radial scar.      BREAST-SPECIFIC HISTORY:  Prior breast surgeries: No  Prior radiation history: No  Bra size: 34 C  Dominant hand: Left    FAMILY HISTORY:  Breast ca: No  Ovarian ca: No  Pancreatic ca: No  Melanoma: No  Gastric ca: No  Colon ca: No  Other cancer: No    No past medical history on file.    No past surgical history on file.  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    ROS:  Easy bruising/bleeding: No  History of DVT/PE: No    Ht 1.626 m (5' 4\")   Wt 51 kg (112 lb 8 oz)   BMI 19.31 kg/m     Physical Exam  Constitutional:       Appearance: She is well-developed.   Chest:   Breasts: Breasts are symmetrical.      Right: No inverted nipple, mass, nipple discharge, skin change, tenderness, axillary adenopathy or supraclavicular adenopathy.      Left: No inverted nipple, mass, nipple discharge, skin change, tenderness, axillary adenopathy or supraclavicular adenopathy.        Comments: Patient was examined in both supine and upright positions.   Lymphadenopathy:      Cervical: No cervical adenopathy.      Right cervical: No superficial, deep or " posterior cervical adenopathy.     Left cervical: No superficial, deep or posterior cervical adenopathy.      Upper Body:      Right upper body: No supraclavicular, axillary or pectoral adenopathy.      Left upper body: No supraclavicular, axillary or pectoral adenopathy.      Comments: No lymphedema in bilateral upper extremities.   Skin:     General: Skin is warm and dry.          INVESTIGATIONS:    Contrast-Enhanced Mammogram (3/4/2022) showed:  Findings:     Mammogram:   Mild background parenchymal enhancement.  The left breast mass in the central breast at middle depth mildly enhances and measures 1.1 x 1 x 0.8 cm.  In the right breast upper outer quadrant at anterior to middle depth, there is subtle asymmetric nonmass enhancement.  Ultrasound:  Targeted, real-time ultrasound evaluation of the right breast was performed by the technologist and radiologist. The right superior breast between 9-2 o'clock was evaluated.  Right breast,12:00 postilion, 4 cm from the nipple there is an irregular hypoechoic mass with angular margins measures 0.9 x 0.4 x 0.4 cm. At the superior aspect of the mass is a cyst, it either abuts or is in connection with the mass. Normal appearing right axillary lymph nodes.   IMPRESSION: BI-RADS CATEGORY: 4 - Suspicious.    Diagnostic Mammogram & Ultrasound (2/23/2022) showed:  FINDINGS:  MAMMOGRAM:  TECHNIQUE: Standard mammographic views were performed with tomosynthesis and synthetic mammogram.  BREAST DENSITY: Heterogeneously dense. In the left central breast at middle depth, there is an oval circumscribed mass measuring 8 mm.  ULTRASOUND:  Targeted, real-time ultrasound evaluation was performed by the technologist and radiologist.  In the left breast at 12:00, 1 cm and the nipple, there is an oval hypoechoic mass measuring 8 x 6 x 8 mm with possible irregular margins.  IMPRESSION: BI-RADS CATEGORY: 4 - Suspicious.    Screening Mammogram (2/8/2022) showed:  Findings: The breasts are  heterogeneously dense, which may obscure small masses.  There is a possible mass in the left breast central core, middle depth.  The remainder of the breast tissue is unremarkable.  There is no suspicious finding of the right breast.  IMPRESSION: ACR BI-RADS Category 0: Need Additional Imaging Evaluation    Biopsy (3/4/2022) showed:  Final Diagnosis   A. BREAST, LEFT, CENTRAL MIDDLE POSITION, STEREOTACTIC CORE BIOPSY:  - Fibroadenoma  - Negative for atypia or malignancy     B. BREAST, RIGHT, 12:00, 4 CM FROM NIPPLE, ULTRASOUND GUIDED CORE BIOPSY:  - Radial sclerosing lesion (radial scar) with accompanying adenosis, usual ductal hyperplasia and columnar cell change  - Calcifications associated with benign epithelium/usual ductal hyperplasia (UDH)  - Negative for atypia or malignancy       ASSESSMENT:  Alistair Huggins is a 41 year old woman with radial scar of the RIGHT breast.    I personally reviewed the imaging above.    I reviewed the imaging and diagnosis with Alistair Huggins.  The natural history of fibroadenoma was discussed with the patient.  Fibroadenoma are benign non-proliferative lesions.  They can be managed expectantly, with a view to re-image and re-biopsy should the lesion change clinically.  Alternatively, for symptom relief, surgical excision in the form of lumpectomy is reasonable as well. She is asymptomatic and no further follow up or treatment is indicated for the LEFT fibroadenoma.     The natural history of radial scars were discussed with the patient.  This is a proliferative benign lesion of the breast.  An excision following the core needle biopsy is recommended for diagnostic purposes because there are a small percentage of radial scars that are upgraded to non-invasive or invasive cancer following excision.  Because the lesion is not palpable, a wire-localized approach will be taken.  She would present on the day of surgery for an image-guided wire placement, followed by a surgical  excision in the operating room.  The risks of a wire-localized lumpectomy were discussed with the patient, including the risks of bleeding, wound infection, wound dehiscence, and post-operative contour change to the breast.  We discussed that surgical pathology results will be reviewed at the postoperative visit to allow for careful discussion of next steps and for answering questions.    Alternatively, watchful waiting with repeat diagnostic imaging in 6 months to determine interval radiographic changes would be reasonable as well for the RIGHT radial scar. We discussed every 6 monthly imaging is typically performed to establish stability for 2 years.    All of the above was discussed with Alанна Joseluis and all questions were answered.  She elected to proceed with repeat diagnostic imaging in 6 months and will follow up if there is a change.    Total time spent with the patient was 25 minutes, of which 75% was counseling.     PLAN:  1. RIGHT diagnostic mammogram and US in 6 months.  2. Follow up with me PRN.    Beverly Nixon MD MSc [facility] FACS  Assistant Professor of Surgery  Division of Surgical Oncology  [facility]     30 minutes spent on the date of the encounter doing chart review, review of test results, interpretation of tests, patient visit and documentation.

## 2022-04-21 ENCOUNTER — TELEPHONE (OUTPATIENT)
Dept: SURGERY | Facility: CLINIC | Age: 42
End: 2022-04-21
Payer: COMMERCIAL

## 2022-04-21 NOTE — TELEPHONE ENCOUNTER
4/21 Provided phone number [phone] to schedule  in about 6 months (around 10/20/2022) for Breast Imaging.    Kylie more Procedure   Orthopedics, Podiatry, Sports Medicine, ENT/Eye Specialties  [facility]   [phone]

## 2022-09-18 ENCOUNTER — HEALTH MAINTENANCE LETTER (OUTPATIENT)
Age: 42
End: 2022-09-18

## 2022-10-14 ENCOUNTER — ANCILLARY PROCEDURE (OUTPATIENT)
Dept: MAMMOGRAPHY | Facility: CLINIC | Age: 42
End: 2022-10-14
Attending: SURGERY
Payer: COMMERCIAL

## 2022-10-14 ENCOUNTER — ANCILLARY PROCEDURE (OUTPATIENT)
Dept: ULTRASOUND IMAGING | Facility: CLINIC | Age: 42
End: 2022-10-14
Attending: SURGERY
Payer: COMMERCIAL

## 2022-10-14 DIAGNOSIS — N64.89 RADIAL SCAR OF RIGHT BREAST: ICD-10-CM

## 2022-10-14 PROCEDURE — 76642 ULTRASOUND BREAST LIMITED: CPT | Mod: RT

## 2022-10-14 PROCEDURE — G0279 TOMOSYNTHESIS, MAMMO: HCPCS

## 2022-10-14 PROCEDURE — 77065 DX MAMMO INCL CAD UNI: CPT | Mod: RT

## 2023-03-31 ENCOUNTER — OFFICE VISIT (OUTPATIENT)
Dept: FAMILY MEDICINE | Facility: CLINIC | Age: 43
End: 2023-03-31
Payer: COMMERCIAL

## 2023-03-31 VITALS
SYSTOLIC BLOOD PRESSURE: 130 MMHG | HEIGHT: 63 IN | WEIGHT: 109 LBS | HEART RATE: 84 BPM | DIASTOLIC BLOOD PRESSURE: 84 MMHG | OXYGEN SATURATION: 99 % | BODY MASS INDEX: 19.31 KG/M2 | TEMPERATURE: 98 F

## 2023-03-31 DIAGNOSIS — M70.61 TROCHANTERIC BURSITIS OF RIGHT HIP: ICD-10-CM

## 2023-03-31 DIAGNOSIS — L30.9 ECZEMA, UNSPECIFIED TYPE: ICD-10-CM

## 2023-03-31 DIAGNOSIS — F10.10 ALCOHOL USE DISORDER, MILD, ABUSE: ICD-10-CM

## 2023-03-31 DIAGNOSIS — Z13.1 SCREENING FOR DIABETES MELLITUS: ICD-10-CM

## 2023-03-31 DIAGNOSIS — Z00.00 ROUTINE GENERAL MEDICAL EXAMINATION AT A HEALTH CARE FACILITY: Primary | ICD-10-CM

## 2023-03-31 LAB
ALBUMIN SERPL BCG-MCNC: 4.5 G/DL (ref 3.5–5.2)
ALP SERPL-CCNC: 30 U/L (ref 35–104)
ALT SERPL W P-5'-P-CCNC: 24 U/L (ref 10–35)
ANION GAP SERPL CALCULATED.3IONS-SCNC: 10 MMOL/L (ref 7–15)
AST SERPL W P-5'-P-CCNC: 31 U/L (ref 10–35)
BASOPHILS # BLD AUTO: 0 10E3/UL (ref 0–0.2)
BASOPHILS NFR BLD AUTO: 1 %
BILIRUB SERPL-MCNC: 0.7 MG/DL
BUN SERPL-MCNC: 13.2 MG/DL (ref 6–20)
CALCIUM SERPL-MCNC: 9.4 MG/DL (ref 8.6–10)
CHLORIDE SERPL-SCNC: 102 MMOL/L (ref 98–107)
CREAT SERPL-MCNC: 0.57 MG/DL (ref 0.51–0.95)
DEPRECATED HCO3 PLAS-SCNC: 26 MMOL/L (ref 22–29)
EOSINOPHIL # BLD AUTO: 0.1 10E3/UL (ref 0–0.7)
EOSINOPHIL NFR BLD AUTO: 2 %
ERYTHROCYTE [DISTWIDTH] IN BLOOD BY AUTOMATED COUNT: 12.8 % (ref 10–15)
GFR SERPL CREATININE-BSD FRML MDRD: >90 ML/MIN/1.73M2
GLUCOSE SERPL-MCNC: 94 MG/DL (ref 70–99)
HBV SURFACE AB SERPL IA-ACNC: 1.44 M[IU]/ML
HBV SURFACE AB SERPL IA-ACNC: NONREACTIVE M[IU]/ML
HCT VFR BLD AUTO: 39.3 % (ref 35–47)
HGB BLD-MCNC: 12.9 G/DL (ref 11.7–15.7)
LYMPHOCYTES # BLD AUTO: 0.9 10E3/UL (ref 0.8–5.3)
LYMPHOCYTES NFR BLD AUTO: 26 %
MCH RBC QN AUTO: 31.7 PG (ref 26.5–33)
MCHC RBC AUTO-ENTMCNC: 32.8 G/DL (ref 31.5–36.5)
MCV RBC AUTO: 97 FL (ref 78–100)
MONOCYTES # BLD AUTO: 0.6 10E3/UL (ref 0–1.3)
MONOCYTES NFR BLD AUTO: 15 %
NEUTROPHILS # BLD AUTO: 2 10E3/UL (ref 1.6–8.3)
NEUTROPHILS NFR BLD AUTO: 56 %
PLATELET # BLD AUTO: 219 10E3/UL (ref 150–450)
POTASSIUM SERPL-SCNC: 4.3 MMOL/L (ref 3.4–5.3)
PROT SERPL-MCNC: 6.9 G/DL (ref 6.4–8.3)
RBC # BLD AUTO: 4.07 10E6/UL (ref 3.8–5.2)
SODIUM SERPL-SCNC: 138 MMOL/L (ref 136–145)
WBC # BLD AUTO: 3.6 10E3/UL (ref 4–11)

## 2023-03-31 PROCEDURE — 99396 PREV VISIT EST AGE 40-64: CPT | Performed by: STUDENT IN AN ORGANIZED HEALTH CARE EDUCATION/TRAINING PROGRAM

## 2023-03-31 PROCEDURE — 99213 OFFICE O/P EST LOW 20 MIN: CPT | Mod: 25 | Performed by: STUDENT IN AN ORGANIZED HEALTH CARE EDUCATION/TRAINING PROGRAM

## 2023-03-31 PROCEDURE — 85025 COMPLETE CBC W/AUTO DIFF WBC: CPT | Performed by: STUDENT IN AN ORGANIZED HEALTH CARE EDUCATION/TRAINING PROGRAM

## 2023-03-31 PROCEDURE — 36415 COLL VENOUS BLD VENIPUNCTURE: CPT | Performed by: STUDENT IN AN ORGANIZED HEALTH CARE EDUCATION/TRAINING PROGRAM

## 2023-03-31 PROCEDURE — 80053 COMPREHEN METABOLIC PANEL: CPT | Performed by: STUDENT IN AN ORGANIZED HEALTH CARE EDUCATION/TRAINING PROGRAM

## 2023-03-31 PROCEDURE — 86706 HEP B SURFACE ANTIBODY: CPT | Performed by: STUDENT IN AN ORGANIZED HEALTH CARE EDUCATION/TRAINING PROGRAM

## 2023-03-31 RX ORDER — TRIAMCINOLONE ACETONIDE 1 MG/G
CREAM TOPICAL 2 TIMES DAILY
Qty: 45 G | Refills: 2 | Status: SHIPPED | OUTPATIENT
Start: 2023-03-31 | End: 2023-07-07

## 2023-03-31 ASSESSMENT — ANXIETY QUESTIONNAIRES
5. BEING SO RESTLESS THAT IT IS HARD TO SIT STILL: NOT AT ALL
4. TROUBLE RELAXING: NOT AT ALL
7. FEELING AFRAID AS IF SOMETHING AWFUL MIGHT HAPPEN: SEVERAL DAYS
GAD7 TOTAL SCORE: 2
1. FEELING NERVOUS, ANXIOUS, OR ON EDGE: NOT AT ALL
IF YOU CHECKED OFF ANY PROBLEMS ON THIS QUESTIONNAIRE, HOW DIFFICULT HAVE THESE PROBLEMS MADE IT FOR YOU TO DO YOUR WORK, TAKE CARE OF THINGS AT HOME, OR GET ALONG WITH OTHER PEOPLE: NOT DIFFICULT AT ALL
GAD7 TOTAL SCORE: 2
8. IF YOU CHECKED OFF ANY PROBLEMS, HOW DIFFICULT HAVE THESE MADE IT FOR YOU TO DO YOUR WORK, TAKE CARE OF THINGS AT HOME, OR GET ALONG WITH OTHER PEOPLE?: NOT DIFFICULT AT ALL
2. NOT BEING ABLE TO STOP OR CONTROL WORRYING: NOT AT ALL
6. BECOMING EASILY ANNOYED OR IRRITABLE: SEVERAL DAYS
GAD7 TOTAL SCORE: 2
7. FEELING AFRAID AS IF SOMETHING AWFUL MIGHT HAPPEN: SEVERAL DAYS
3. WORRYING TOO MUCH ABOUT DIFFERENT THINGS: NOT AT ALL

## 2023-03-31 ASSESSMENT — ENCOUNTER SYMPTOMS
COUGH: 0
MYALGIAS: 1
FREQUENCY: 0
JOINT SWELLING: 0
DYSURIA: 0
EYE PAIN: 0
HEMATOCHEZIA: 0
ABDOMINAL PAIN: 0
SORE THROAT: 0
DIARRHEA: 0
FEVER: 0
PARESTHESIAS: 0
CHILLS: 0
NAUSEA: 0
ARTHRALGIAS: 1
HEADACHES: 0
NERVOUS/ANXIOUS: 0
DIZZINESS: 0
HEMATURIA: 0
SHORTNESS OF BREATH: 0
HEARTBURN: 0
WEAKNESS: 0
PALPITATIONS: 0

## 2023-03-31 ASSESSMENT — PATIENT HEALTH QUESTIONNAIRE - PHQ9
SUM OF ALL RESPONSES TO PHQ QUESTIONS 1-9: 2
10. IF YOU CHECKED OFF ANY PROBLEMS, HOW DIFFICULT HAVE THESE PROBLEMS MADE IT FOR YOU TO DO YOUR WORK, TAKE CARE OF THINGS AT HOME, OR GET ALONG WITH OTHER PEOPLE: NOT DIFFICULT AT ALL
SUM OF ALL RESPONSES TO PHQ QUESTIONS 1-9: 2

## 2023-03-31 NOTE — PROGRESS NOTES
Answers for HPI/ROS submitted by the patient on 3/31/2023  If you checked off any problems, how difficult have these problems made it for you to do your work, take care of things at home, or get along with other people?: Not difficult at all  PHQ9 TOTAL SCORE: 2  JESSICA 7 TOTAL SCORE: 2   SUBJECTIVE:   CC: Alistair is an 42 year old who presents for preventive health visit.   No flowsheet data found.  Patient has been advised of split billing requirements and indicates understanding: Yes  Healthy Habits:     Getting at least 3 servings of Calcium per day:  Yes    Bi-annual eye exam:  Yes    Dental care twice a year:  Yes    Sleep apnea or symptoms of sleep apnea:  None    Diet:  Regular (no restrictions)    Frequency of exercise:  1 day/week    Duration of exercise:  45-60 minutes    Taking medications regularly:  Not Applicable    Medication side effects:  Not applicable    PHQ-2 Total Score: 0    Additional concerns today:  Yes    Fasting, had anahi tea     Last pap due Jan 2022.   Abnormal mammogram, due for 6 month follow. Follows with OBGYN   On menses currently. Now more regular. Sometimes light and no cramps and othertimes more severe   had vasectomy       Ezcema-  Was on top of feet and now on back of hands.  Washes hands a lot, works in kitchen  Uses lotion after showers. Takes hot showers.         Right hip-  On feet 9 hours a day  Steadily worsening; present for about 4 months  tightness and pain on the right lateral and posterior hip. Hard to stretch it out at the end of the day. Sometimes hard getting up from seated/laying position.  Ibuprofen when needed   Diligently stretching, yoga stretching, foam roller helps  No shooting pain. No weakness.   Sometimes numbness/tingling down legs.   No perineal numbness. No incontinence.         No exercise but walks sometimes with friends. Kayaks some in summer    Eating habits good, more grazing with working as a .     Mental health has been good.  Holidays harder. Family is in [place]. Good support system.  and 2 kids     Former smoker     EtOH; alcohol use has increased.  has recommended cutting back. Environmental influences. Is interested in support groups. Doesn't drink daily, depends on the week. No eye opener. No withdrawal symptoms. Went to ED 12/2022,    AUDIT 14           Today's PHQ-2 Score:   PHQ-2 ( 1999 [employer]) 3/31/2023   Q1: Little interest or pleasure in doing things 0   Q2: Feeling down, depressed or hopeless 0   PHQ-2 Score 0   Q1: Little interest or pleasure in doing things Not at all   Q2: Feeling down, depressed or hopeless Not at all   PHQ-2 Score 0         Social History     Tobacco Use     Smoking status: Never     Smokeless tobacco: Never   Substance Use Topics     Alcohol use: Yes     Comment: 4-5      Alcohol intakes varies on the week.        Alcohol Use 3/31/2023   Prescreen: >3 drinks/day or >7 drinks/week? -   AUDIT SCORE  14     AUDIT - Alcohol Use Disorders Identification Test - Reproduced from the World Health Organization Audit 2001 (Second Edition) 3/31/2023   1.  How often do you have a drink containing alcohol? 4 or more times a week   2.  How many drinks containing alcohol do you have on a typical day when you are drinking? 3 or 4   3.  How often do you have five or more drinks on one occasion? Monthly   4.  How often during the last year have you found that you were not able to stop drinking once you had started? Less than monthly   5.  How often during the last year have you failed to do what was normally expected of you because of drinking? Never   6.  How often during the last year have you needed a first drink in the morning to get yourself going after a heavy drinking session? Never   7.  How often during the last year have you had a feeling of guilt or remorse after drinking? Less than monthly   8.  How often during the last year have you been unable to remember what happened the night before  because of your drinking? Less than monthly   9.  Have you or someone else been injured because of your drinking? No   10. Has a relative, friend, doctor or other health care worker been concerned about your drinking or suggested you cut down? Yes, during the last year   TOTAL SCORE 14     Reviewed orders with patient.  Reviewed health maintenance and updated orders accordingly - Yes  Lab work is in process  Labs reviewed in EPIC  BP Readings from Last 3 Encounters:   03/31/23 130/84   03/28/22 118/74   01/27/22 131/81    Wt Readings from Last 3 Encounters:   03/31/23 49.4 kg (109 lb)   04/20/22 51 kg (112 lb 8 oz)   03/28/22 51.3 kg (113 lb)                  There is no problem list on file for this patient.    No past surgical history on file.    Social History     Tobacco Use     Smoking status: Never     Smokeless tobacco: Never   Substance Use Topics     Alcohol use: Yes     Comment: 4-5      Family History   Problem Relation Age of Onset     Kidney Disease Mother 50        kidney failure     Diabetes Father      Cerebrovascular Disease Paternal Grandmother          Current Outpatient Medications   Medication Sig Dispense Refill     triamcinolone (KENALOG) 0.1 % external cream Apply topically 2 times daily 45 g 2     No Known Allergies    Breast Cancer Screening:    Breast CA Risk Assessment (FHS-7) 3/28/2022   Do you have a family history of breast, colon, or ovarian cancer? No / Unknown       click delete button to remove this line now  Mammogram Screening - Offered annual screening and updated Health Maintenance for mutual plan based on risk factor consideration    Pertinent mammograms are reviewed under the imaging tab.    History of abnormal Pap smear:   Last 3 Pap and HPV Results:   PAP / HPV Latest Ref Rng & Units 1/27/2022   PAP   Negative for Intraepithelial Lesion or Malignancy (NILM)   HPV16 Negative Negative   HPV18 Negative Negative   HRHPV Negative Negative     PAP / HPV Latest Ref Rng & Units  "2022   PAP   Negative for Intraepithelial Lesion or Malignancy (NILM)   HPV16 Negative Negative   HPV18 Negative Negative   HRHPV Negative Negative     Reviewed and updated as needed this visit by clinical staff   Tobacco  Allergies  Meds              Reviewed and updated as needed this visit by Provider                 No past medical history on file.   No past surgical history on file.  OB History    Para Term  AB Living   2 0 0 0 0 2   SAB IAB Ectopic Multiple Live Births   0 0 0 0 0      # Outcome Date GA Lbr Parmjit/2nd Weight Sex Delivery Anes PTL Lv   2             1                 Review of Systems   Constitutional: Negative for chills and fever.   HENT: Negative for congestion, ear pain, hearing loss and sore throat.    Eyes: Negative for pain and visual disturbance.   Respiratory: Negative for cough and shortness of breath.    Cardiovascular: Negative for chest pain, palpitations and peripheral edema.   Gastrointestinal: Negative for abdominal pain, diarrhea, heartburn, hematochezia and nausea.   Genitourinary: Negative for dysuria, frequency, genital sores, hematuria and urgency.   Musculoskeletal: Positive for arthralgias and myalgias. Negative for joint swelling.   Skin: Negative for rash.   Neurological: Negative for dizziness, weakness, headaches and paresthesias.   Psychiatric/Behavioral: Negative for mood changes. The patient is not nervous/anxious.         OBJECTIVE:   /84 (BP Location: Right arm, Patient Position: Sitting, Cuff Size: Adult Regular)   Pulse 84   Temp 98  F (36.7  C) (Oral)   Ht 1.61 m (5' 3.39\")   Wt 49.4 kg (109 lb)   LMP 2023 (Exact Date)   SpO2 99%   BMI 19.07 kg/m    Physical Exam  GENERAL: healthy, alert and no distress  EYES: Eyes grossly normal to inspection, PERRL and conjunctivae and sclerae normal  HENT: ear canals and TM's normal, nose and mouth without ulcers or lesions  NECK: no adenopathy, no asymmetry, masses, or " scars and thyroid normal to palpation  RESP: lungs clear to auscultation - no rales, rhonchi or wheezes  CV: regular rate and rhythm, normal S1 S2, no S3 or S4, no murmur, click or rub, no peripheral edema and peripheral pulses strong  ABDOMEN: soft, nontender, no hepatosplenomegaly, no masses and bowel sounds normal  MS: no gross musculoskeletal defects noted, no edema. Pain on lateral right hip to palpation  SKIN: no suspicious lesions. Dry scaly thickened rashes on posterior hands   NEURO: Normal strength and tone, mentation intact and speech normal  PSYCH: mentation appears normal, affect normal/bright    Diagnostic Test Results:  Labs reviewed in Epic    ASSESSMENT/PLAN:   (Z00.00) Routine general medical examination at a health care facility  (primary encounter diagnosis)  Plan: Hepatitis B Surface Antibody           (L30.9) Eczema, unspecified type  Comment: primarily on hands and feet. Recommend using non-scented eczema cream BID and use triamcinolone PRN   Plan: triamcinolone (KENALOG) 0.1 % external cream           (F10.10) Alcohol use disorder, mild, abuse  Comment: she is interested in decreasing alcohol use. Discussed treatment options today briefly including naltrexone. We will check LFTs. She is also interested in support groups. We will refer to addictions medicine for treatment options and will have  reach out regarding community resources.   Plan: Adult Mental Health  Referral, Social         Work Referral (Only to be ordered at:         [facility] [place]), CBC with platelets and         differential, Comprehensive metabolic panel         (BMP + Alb, Alk Phos, ALT, AST, Total. Bili,         TP)            (Z13.1) Screening for diabetes mellitus  Plan: Comprehensive metabolic panel (BMP + Alb, Alk         Phos, ALT, AST, Total. Bili, TP)        (M70.61) Trochanteric Bursitis of right hip   Comment: recommend being consistent with stretching legs/IT band especially given frequency  of standing at work. Can use ice. Consider PT or cortisone injection            Patient has been advised of split billing requirements and indicates understanding: Yes      COUNSELING:  Reviewed preventive health counseling, as reflected in patient instructions       Regular exercise       Healthy diet/nutrition       Alcohol Use       Contraception       Family planning        She reports that she has never smoked. She has never used smokeless tobacco.      Mindi Stevens [facility]

## 2023-07-05 ENCOUNTER — HOSPITAL ENCOUNTER (EMERGENCY)
Facility: CLINIC | Age: 43
Discharge: HOME | End: 2023-07-07
Attending: INTERNAL MEDICINE | Admitting: INTERNAL MEDICINE
Payer: COMMERCIAL

## 2023-07-05 ENCOUNTER — TELEPHONE (OUTPATIENT)
Dept: BEHAVIORAL HEALTH | Facility: CLINIC | Age: 43
End: 2023-07-05

## 2023-07-05 DIAGNOSIS — F10.229 ACUTE ALCOHOLIC INTOXICATION IN ALCOHOLISM WITH COMPLICATION (H): ICD-10-CM

## 2023-07-05 DIAGNOSIS — L30.9 ECZEMA, UNSPECIFIED TYPE: ICD-10-CM

## 2023-07-05 DIAGNOSIS — R45.851 DEPRESSION WITH SUICIDAL IDEATION: ICD-10-CM

## 2023-07-05 DIAGNOSIS — F32.A DEPRESSION WITH SUICIDAL IDEATION: ICD-10-CM

## 2023-07-05 LAB
ALBUMIN SERPL BCG-MCNC: 5.1 G/DL (ref 3.5–5.2)
ALCOHOL BREATH TEST: 0.29 (ref 0–0.01)
ALP SERPL-CCNC: 41 U/L (ref 35–104)
ALT SERPL W P-5'-P-CCNC: 227 U/L (ref 0–50)
ANION GAP SERPL CALCULATED.3IONS-SCNC: 13 MMOL/L (ref 7–15)
AST SERPL W P-5'-P-CCNC: 349 U/L (ref 0–45)
BASOPHILS # BLD AUTO: 0 10E3/UL (ref 0–0.2)
BASOPHILS NFR BLD AUTO: 1 %
BILIRUB SERPL-MCNC: 0.3 MG/DL
BUN SERPL-MCNC: 6.7 MG/DL (ref 6–20)
CALCIUM SERPL-MCNC: 9.1 MG/DL (ref 8.6–10)
CHLORIDE SERPL-SCNC: 101 MMOL/L (ref 98–107)
CREAT SERPL-MCNC: 0.6 MG/DL (ref 0.51–0.95)
DEPRECATED HCO3 PLAS-SCNC: 27 MMOL/L (ref 22–29)
EOSINOPHIL # BLD AUTO: 0.2 10E3/UL (ref 0–0.7)
EOSINOPHIL NFR BLD AUTO: 4 %
ERYTHROCYTE [DISTWIDTH] IN BLOOD BY AUTOMATED COUNT: 12.5 % (ref 10–15)
ETHANOL SERPL-MCNC: 0.31 G/DL
GFR SERPL CREATININE-BSD FRML MDRD: >90 ML/MIN/1.73M2
GLUCOSE SERPL-MCNC: 88 MG/DL (ref 70–99)
HCT VFR BLD AUTO: 43.3 % (ref 35–47)
HGB BLD-MCNC: 14.6 G/DL (ref 11.7–15.7)
IMM GRANULOCYTES # BLD: 0 10E3/UL
IMM GRANULOCYTES NFR BLD: 0 %
INR PPP: 0.96 (ref 0.85–1.15)
LIPASE SERPL-CCNC: 73 U/L (ref 13–60)
LYMPHOCYTES # BLD AUTO: 1.7 10E3/UL (ref 0.8–5.3)
LYMPHOCYTES NFR BLD AUTO: 41 %
MCH RBC QN AUTO: 32.8 PG (ref 26.5–33)
MCHC RBC AUTO-ENTMCNC: 33.7 G/DL (ref 31.5–36.5)
MCV RBC AUTO: 97 FL (ref 78–100)
MONOCYTES # BLD AUTO: 0.4 10E3/UL (ref 0–1.3)
MONOCYTES NFR BLD AUTO: 10 %
NEUTROPHILS # BLD AUTO: 1.8 10E3/UL (ref 1.6–8.3)
NEUTROPHILS NFR BLD AUTO: 44 %
NRBC # BLD AUTO: 0 10E3/UL
NRBC BLD AUTO-RTO: 0 /100
PLATELET # BLD AUTO: 219 10E3/UL (ref 150–450)
POTASSIUM SERPL-SCNC: 4 MMOL/L (ref 3.4–5.3)
PROT SERPL-MCNC: 8.2 G/DL (ref 6.4–8.3)
RBC # BLD AUTO: 4.45 10E6/UL (ref 3.8–5.2)
SODIUM SERPL-SCNC: 141 MMOL/L (ref 136–145)
WBC # BLD AUTO: 4.1 10E3/UL (ref 4–11)

## 2023-07-05 PROCEDURE — 80053 COMPREHEN METABOLIC PANEL: CPT | Performed by: INTERNAL MEDICINE

## 2023-07-05 PROCEDURE — 36415 COLL VENOUS BLD VENIPUNCTURE: CPT | Performed by: INTERNAL MEDICINE

## 2023-07-05 PROCEDURE — 99285 EMERGENCY DEPT VISIT HI MDM: CPT | Mod: 25

## 2023-07-05 PROCEDURE — 85025 COMPLETE CBC W/AUTO DIFF WBC: CPT | Performed by: INTERNAL MEDICINE

## 2023-07-05 PROCEDURE — 83690 ASSAY OF LIPASE: CPT | Performed by: INTERNAL MEDICINE

## 2023-07-05 PROCEDURE — 90791 PSYCH DIAGNOSTIC EVALUATION: CPT

## 2023-07-05 PROCEDURE — 85610 PROTHROMBIN TIME: CPT | Performed by: INTERNAL MEDICINE

## 2023-07-05 PROCEDURE — 250N000013 HC RX MED GY IP 250 OP 250 PS 637: Performed by: INTERNAL MEDICINE

## 2023-07-05 PROCEDURE — 82075 ASSAY OF BREATH ETHANOL: CPT

## 2023-07-05 PROCEDURE — 99285 EMERGENCY DEPT VISIT HI MDM: CPT | Performed by: EMERGENCY MEDICINE

## 2023-07-05 PROCEDURE — 82077 ASSAY SPEC XCP UR&BREATH IA: CPT | Performed by: INTERNAL MEDICINE

## 2023-07-05 RX ORDER — MULTIPLE VITAMINS W/ MINERALS TAB 9MG-400MCG
1 TAB ORAL ONCE
Status: COMPLETED | OUTPATIENT
Start: 2023-07-05 | End: 2023-07-05

## 2023-07-05 RX ORDER — FOLIC ACID 1 MG/1
1 TABLET ORAL DAILY
Status: DISCONTINUED | OUTPATIENT
Start: 2023-07-06 | End: 2023-07-06

## 2023-07-05 RX ADMIN — NICOTINE POLACRILEX 4 MG: 4 GUM, CHEWING BUCCAL at 22:12

## 2023-07-05 RX ADMIN — THIAMINE HCL TAB 100 MG 100 MG: 100 TAB at 22:35

## 2023-07-05 RX ADMIN — MULTIPLE VITAMINS W/ MINERALS TAB 1 TABLET: TAB at 22:35

## 2023-07-05 ASSESSMENT — ENCOUNTER SYMPTOMS
VOMITING: 0
SHORTNESS OF BREATH: 0
HEADACHES: 0
NAUSEA: 0
NUMBNESS: 0
NECK PAIN: 0
ABDOMINAL PAIN: 0
WHEEZING: 0
CONFUSION: 0
ADENOPATHY: 0
COUGH: 0
FEVER: 0
DYSPHORIC MOOD: 1
CHILLS: 0
DIFFICULTY URINATING: 0
SPEECH DIFFICULTY: 0

## 2023-07-05 ASSESSMENT — COLUMBIA-SUICIDE SEVERITY RATING SCALE - C-SSRS
REASONS FOR IDEATION PAST MONTH: COMPLETELY TO END OR STOP THE PAIN (YOU COULDN'T GO ON LIVING WITH THE PAIN OR HOW YOU WERE FEELING)
5. HAVE YOU STARTED TO WORK OUT OR WORKED OUT THE DETAILS OF HOW TO KILL YOURSELF? DO YOU INTEND TO CARRY OUT THIS PLAN?: NO
TOTAL  NUMBER OF INTERRUPTED ATTEMPTS LIFETIME: NO
3. HAVE YOU BEEN THINKING ABOUT HOW YOU MIGHT KILL YOURSELF?: YES
2. HAVE YOU ACTUALLY HAD ANY THOUGHTS OF KILLING YOURSELF?: YES
ATTEMPT LIFETIME: NO
6. HAVE YOU EVER DONE ANYTHING, STARTED TO DO ANYTHING, OR PREPARED TO DO ANYTHING TO END YOUR LIFE?: NO
1. IN THE PAST MONTH, HAVE YOU WISHED YOU WERE DEAD OR WISHED YOU COULD GO TO SLEEP AND NOT WAKE UP?: YES
5. HAVE YOU STARTED TO WORK OUT OR WORKED OUT THE DETAILS OF HOW TO KILL YOURSELF? DO YOU INTEND TO CARRY OUT THIS PLAN?: NO
REASONS FOR IDEATION LIFETIME: COMPLETELY TO END OR STOP THE PAIN (YOU COULDN'T GO ON LIVING WITH THE PAIN OR HOW YOU WERE FEELING)
1. HAVE YOU WISHED YOU WERE DEAD OR WISHED YOU COULD GO TO SLEEP AND NOT WAKE UP?: YES
4. HAVE YOU HAD THESE THOUGHTS AND HAD SOME INTENTION OF ACTING ON THEM?: NO
2. HAVE YOU ACTUALLY HAD ANY THOUGHTS OF KILLING YOURSELF?: YES
4. HAVE YOU HAD THESE THOUGHTS AND HAD SOME INTENTION OF ACTING ON THEM?: NO
TOTAL  NUMBER OF ABORTED OR SELF INTERRUPTED ATTEMPTS LIFETIME: NO

## 2023-07-05 ASSESSMENT — ACTIVITIES OF DAILY LIVING (ADL)
ADLS_ACUITY_SCORE: 35
ADLS_ACUITY_SCORE: 35

## 2023-07-06 ENCOUNTER — TELEPHONE (OUTPATIENT)
Dept: BEHAVIORAL HEALTH | Facility: CLINIC | Age: 43
End: 2023-07-06
Payer: COMMERCIAL

## 2023-07-06 LAB
ALBUMIN SERPL BCG-MCNC: 4.6 G/DL (ref 3.5–5.2)
ALBUMIN UR-MCNC: 20 MG/DL
ALP SERPL-CCNC: 37 U/L (ref 35–104)
ALT SERPL W P-5'-P-CCNC: 202 U/L (ref 0–50)
AMPHETAMINES UR QL SCN: ABNORMAL
ANION GAP SERPL CALCULATED.3IONS-SCNC: 12 MMOL/L (ref 7–15)
ANION GAP SERPL CALCULATED.3IONS-SCNC: 9 MMOL/L (ref 7–15)
APPEARANCE UR: ABNORMAL
AST SERPL W P-5'-P-CCNC: 260 U/L (ref 0–45)
BARBITURATES UR QL SCN: ABNORMAL
BENZODIAZ UR QL SCN: ABNORMAL
BILIRUB SERPL-MCNC: 0.7 MG/DL
BILIRUB UR QL STRIP: NEGATIVE
BUN SERPL-MCNC: 17.1 MG/DL (ref 6–20)
BUN SERPL-MCNC: 6.9 MG/DL (ref 6–20)
BZE UR QL SCN: ABNORMAL
CALCIUM SERPL-MCNC: 9.2 MG/DL (ref 8.6–10)
CALCIUM SERPL-MCNC: 9.5 MG/DL (ref 8.6–10)
CANNABINOIDS UR QL SCN: ABNORMAL
CHLORIDE SERPL-SCNC: 100 MMOL/L (ref 98–107)
CHLORIDE SERPL-SCNC: 97 MMOL/L (ref 98–107)
COLOR UR AUTO: YELLOW
CREAT SERPL-MCNC: 0.64 MG/DL (ref 0.51–0.95)
CREAT SERPL-MCNC: 0.75 MG/DL (ref 0.51–0.95)
DEPRECATED HCO3 PLAS-SCNC: 26 MMOL/L (ref 22–29)
DEPRECATED HCO3 PLAS-SCNC: 30 MMOL/L (ref 22–29)
GFR SERPL CREATININE-BSD FRML MDRD: >90 ML/MIN/1.73M2
GFR SERPL CREATININE-BSD FRML MDRD: >90 ML/MIN/1.73M2
GLUCOSE SERPL-MCNC: 87 MG/DL (ref 70–99)
GLUCOSE SERPL-MCNC: 93 MG/DL (ref 70–99)
GLUCOSE UR STRIP-MCNC: NEGATIVE MG/DL
HCG UR QL: NEGATIVE
HGB UR QL STRIP: NEGATIVE
KETONES UR STRIP-MCNC: NEGATIVE MG/DL
LEUKOCYTE ESTERASE UR QL STRIP: NEGATIVE
MUCOUS THREADS #/AREA URNS LPF: PRESENT /LPF
NITRATE UR QL: NEGATIVE
OPIATES UR QL SCN: ABNORMAL
PH UR STRIP: 6.5 [PH] (ref 5–7)
POTASSIUM SERPL-SCNC: 4 MMOL/L (ref 3.4–5.3)
POTASSIUM SERPL-SCNC: 4.4 MMOL/L (ref 3.4–5.3)
PROT SERPL-MCNC: 6.9 G/DL (ref 6.4–8.3)
RBC URINE: 1 /HPF
SODIUM SERPL-SCNC: 136 MMOL/L (ref 136–145)
SODIUM SERPL-SCNC: 138 MMOL/L (ref 136–145)
SP GR UR STRIP: 1.02 (ref 1–1.03)
SQUAMOUS EPITHELIAL: 10 /HPF
UROBILINOGEN UR STRIP-MCNC: 2 MG/DL
WBC URINE: 1 /HPF

## 2023-07-06 PROCEDURE — 250N000013 HC RX MED GY IP 250 OP 250 PS 637: Performed by: INTERNAL MEDICINE

## 2023-07-06 PROCEDURE — 36415 COLL VENOUS BLD VENIPUNCTURE: CPT | Performed by: EMERGENCY MEDICINE

## 2023-07-06 PROCEDURE — 250N000013 HC RX MED GY IP 250 OP 250 PS 637: Performed by: EMERGENCY MEDICINE

## 2023-07-06 PROCEDURE — 81001 URINALYSIS AUTO W/SCOPE: CPT | Performed by: INTERNAL MEDICINE

## 2023-07-06 PROCEDURE — 81025 URINE PREGNANCY TEST: CPT | Performed by: INTERNAL MEDICINE

## 2023-07-06 PROCEDURE — 80053 COMPREHEN METABOLIC PANEL: CPT | Performed by: EMERGENCY MEDICINE

## 2023-07-06 PROCEDURE — 80307 DRUG TEST PRSMV CHEM ANLYZR: CPT | Performed by: INTERNAL MEDICINE

## 2023-07-06 PROCEDURE — 250N000013 HC RX MED GY IP 250 OP 250 PS 637

## 2023-07-06 PROCEDURE — 99245 OFF/OP CONSLTJ NEW/EST HI 55: CPT

## 2023-07-06 RX ORDER — DIAZEPAM 5 MG
5-20 TABLET ORAL EVERY 30 MIN PRN
Status: DISCONTINUED | OUTPATIENT
Start: 2023-07-06 | End: 2023-07-07 | Stop reason: HOSPADM

## 2023-07-06 RX ORDER — ATENOLOL 50 MG/1
50 TABLET ORAL DAILY PRN
Status: DISCONTINUED | OUTPATIENT
Start: 2023-07-06 | End: 2023-07-07 | Stop reason: HOSPADM

## 2023-07-06 RX ORDER — MULTIPLE VITAMINS W/ MINERALS TAB 9MG-400MCG
1 TAB ORAL DAILY
Status: DISCONTINUED | OUTPATIENT
Start: 2023-07-06 | End: 2023-07-07 | Stop reason: HOSPADM

## 2023-07-06 RX ORDER — FOLIC ACID 1 MG/1
1 TABLET ORAL DAILY
Status: DISCONTINUED | OUTPATIENT
Start: 2023-07-06 | End: 2023-07-07 | Stop reason: HOSPADM

## 2023-07-06 RX ORDER — HYDROXYZINE HYDROCHLORIDE 25 MG/1
25 TABLET, FILM COATED ORAL EVERY 6 HOURS PRN
Status: DISCONTINUED | OUTPATIENT
Start: 2023-07-06 | End: 2023-07-07 | Stop reason: HOSPADM

## 2023-07-06 RX ORDER — HYDROXYZINE HYDROCHLORIDE 50 MG/1
50 TABLET, FILM COATED ORAL EVERY 6 HOURS PRN
Status: DISCONTINUED | OUTPATIENT
Start: 2023-07-06 | End: 2023-07-07 | Stop reason: HOSPADM

## 2023-07-06 RX ADMIN — FOLIC ACID 1 MG: 1 TABLET ORAL at 07:52

## 2023-07-06 RX ADMIN — MULTIPLE VITAMINS W/ MINERALS TAB 1 TABLET: TAB at 07:52

## 2023-07-06 RX ADMIN — THIAMINE HCL TAB 100 MG 100 MG: 100 TAB at 07:52

## 2023-07-06 RX ADMIN — NICOTINE POLACRILEX 4 MG: 4 GUM, CHEWING BUCCAL at 10:23

## 2023-07-06 RX ADMIN — HYDROXYZINE HYDROCHLORIDE 25 MG: 25 TABLET, FILM COATED ORAL at 10:25

## 2023-07-06 ASSESSMENT — ACTIVITIES OF DAILY LIVING (ADL)
ADLS_ACUITY_SCORE: 35

## 2023-07-06 ASSESSMENT — COLUMBIA-SUICIDE SEVERITY RATING SCALE - C-SSRS
6. HAVE YOU EVER DONE ANYTHING, STARTED TO DO ANYTHING, OR PREPARED TO DO ANYTHING TO END YOUR LIFE?: NO
TOTAL  NUMBER OF ABORTED OR SELF INTERRUPTED ATTEMPTS SINCE LAST CONTACT: NO
ATTEMPT SINCE LAST CONTACT: NO
1. SINCE LAST CONTACT, HAVE YOU WISHED YOU WERE DEAD OR WISHED YOU COULD GO TO SLEEP AND NOT WAKE UP?: NO
2. HAVE YOU ACTUALLY HAD ANY THOUGHTS OF KILLING YOURSELF?: NO
TOTAL  NUMBER OF INTERRUPTED ATTEMPTS SINCE LAST CONTACT: NO
SUICIDE, SINCE LAST CONTACT: NO
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
## 2023-07-06 NOTE — CONSULTS
Alistair Huggins MRN# [id number]   Age: 43 year old YOB: 1980   Date of Admission to ED: 7/5/2023    In person visit Details:     Patient was assessed and interviewed face-to-face in person with this writer анна. Patient was observed to be able to participate in the assessment as evidenced by verbal consent. Assessment methods included conducting a formal interview with patient, review of medical records, collaboration with medical staff, and obtaining relevant collateral information from family and community providers when available.        Reason for Consult:   This note is being entered to supplement the psychiatry consultation note that was completed on July 6, 2023 by the licensed mental health professional  Frances COTTON reviewed the pertinent clinical details related to their encounter. I am being consulted to offer additional guidance on psychiatric pharmacological interventions      Writer met patient in consult room face-to-face by herself, patient was pleasant and cooperative during assessment and interview.  Patient denied suicidal ideation or homicidal ideation or self injury behavior.  Patient endorses depressive symptoms, including sleep alteration, loss of interest in pleasurable activities, feelings of guilt/worthlessness/hopelessness, problems with energy, problems with concentration, appetite disturbance. Patient denies suicidal and homicidal ideation.  Patient endorses irritation and anger due to her substance use disorder.  Patient endorses symptoms of generalized anxiety, including excessive worrying throughout the day, associated with, restlessness, easy fatigability, concentration difficulty, irritability, muscle tension.  Patient is willing to start hydroxyzine for her anxiety currently.  She is interested in psychotherapy.  Patient said she feels sad and depressed due to her current circumstance of alcohol use disorder denied using any other substance except  alcohol.  Currently patient is voluntary for inpatient mental health unit, for now we will continue 72 hold since was placed today.    I have reviewed the nursing notes. I have reviewed the findings, diagnosis, plan and need for follow up with the patient.         HPI:        Alistair Huggins is a 43 year old female who presents seeking alcohol treatment. She is also experiencing depression with suicidal ideation. She has been drinking heavily since age 14. She has never been through treatment. She has been drinking upwards of 1 liter of liquor daily. Last drink earlier today. She experiences anxiety with withdrawal and may have had a seizure like event last week for which she was seen at [facility]. She is  from her . She has 2 children ages 11 and 13 who are living with her ex partner. She is currently living alone. She quit her job a few days ago.She has worked in  in the past.  She has been having suicidal thoughts with thoughts of drinking herself to death or cutting herself. She denies other substance use. She cannot contract for safety at present. She denies past medical problems. She denies family history of mental illness. She denies past suicide attempts. She denies past treatment for alcohol or depression        Pt has not required locked seclusion or restraints in the past 24 hours to maintain safety, please refer to RN documentation for further details.  Substance use does  appear to be playing a contributing role in the patient's presentation.  Severe alcohol use disorder  Brief Therapeutic Intervention(s):  Provided active listening, unconditional positive regard, and validation. Engaged in cognitive restructuring/ reframing, looked at common cognitive distortions and challenged negative thoughts. Engaged in guided discovery, explored patient's perspectives and helped expand them through socratic dialogue. Provided positive reinforcement for progress towards goals, gains in  knowledge, and application of skills previously taught.  Engaged in social skills training. Explored and identified early warning signs to anger.        Past Psychiatric History:     She does not have a history of inpatient psychiatric care or psychiatric medications. She told writer that she had to attend therapy as part of her brother's probation but she has not attended individual/personal therapy.         Substance Use and History:     Severe alcohol use disorder        Past Medical History:   PAST MEDICAL HISTORY: History reviewed. No pertinent past medical history.    PAST SURGICAL HISTORY: History reviewed. No pertinent surgical history.            Allergies:   No Known Allergies          Medications:   I have reviewed this patient's current medications           Family History:   FAMILY HISTORY:   Family History   Problem Relation Age of Onset     Kidney Disease Mother 50        kidney failure     Diabetes Father      Cerebrovascular Disease Paternal Grandmother            Social History:   SOCIAL HISTORY:   Social History     Tobacco Use     Smoking status: Some Days     Types: Cigarettes     Smokeless tobacco: Never   Substance Use Topics     Alcohol use: Yes     Comment: 5-6            PTA Medications:   (Not in a hospital admission)         Allergies:   No Known Allergies       Labs:     Recent Results (from the past 48 hour(s))   Alcohol breath test POCT    Collection Time: 07/05/23  7:48 PM   Result Value Ref Range    Alcohol Breath Test 0.288 (A) 0.00 - 0.01   INR    Collection Time: 07/05/23  9:00 PM   Result Value Ref Range    INR 0.96 0.85 - 1.15   Comprehensive metabolic panel    Collection Time: 07/05/23  9:00 PM   Result Value Ref Range    Sodium 141 136 - 145 mmol/L    Potassium 4.0 3.4 - 5.3 mmol/L    Chloride 101 98 - 107 mmol/L    Carbon Dioxide (CO2) 27 22 - 29 mmol/L    Anion Gap 13 7 - 15 mmol/L    Urea Nitrogen 6.7 6.0 - 20.0 mg/dL    Creatinine 0.60 0.51 - 0.95 mg/dL    Calcium 9.1 8.6  "- 10.0 mg/dL    Glucose 88 70 - 99 mg/dL    Alkaline Phosphatase 41 35 - 104 U/L     (H) 0 - 45 U/L     (H) 0 - 50 U/L    Protein Total 8.2 6.4 - 8.3 g/dL    Albumin 5.1 3.5 - 5.2 g/dL    Bilirubin Total 0.3 <=1.2 mg/dL    GFR Estimate >90 >60 mL/min/1.73m2   Lipase    Collection Time: 07/05/23  9:00 PM   Result Value Ref Range    Lipase 73 (H) 13 - 60 U/L   Ethyl Alcohol Level    Collection Time: 07/05/23  9:00 PM   Result Value Ref Range    Alcohol ethyl 0.31 (HH) <=0.01 g/dL   CBC with platelets and differential    Collection Time: 07/05/23  9:00 PM   Result Value Ref Range    WBC Count 4.1 4.0 - 11.0 10e3/uL    RBC Count 4.45 3.80 - 5.20 10e6/uL    Hemoglobin 14.6 11.7 - 15.7 g/dL    Hematocrit 43.3 35.0 - 47.0 %    MCV 97 78 - 100 fL    MCH 32.8 26.5 - 33.0 pg    MCHC 33.7 31.5 - 36.5 g/dL    RDW 12.5 10.0 - 15.0 %    Platelet Count 219 150 - 450 10e3/uL    % Neutrophils 44 %    % Lymphocytes 41 %    % Monocytes 10 %    % Eosinophils 4 %    % Basophils 1 %    % Immature Granulocytes 0 %    NRBCs per 100 WBC 0 <1 /100    Absolute Neutrophils 1.8 1.6 - 8.3 10e3/uL    Absolute Lymphocytes 1.7 0.8 - 5.3 10e3/uL    Absolute Monocytes 0.4 0.0 - 1.3 10e3/uL    Absolute Eosinophils 0.2 0.0 - 0.7 10e3/uL    Absolute Basophils 0.0 0.0 - 0.2 10e3/uL    Absolute Immature Granulocytes 0.0 <=0.4 10e3/uL    Absolute NRBCs 0.0 10e3/uL          Physical and Psychiatric Examination:     /74 (BP Location: Left arm, Patient Position: Sitting, Cuff Size: Adult Regular)   Pulse 87   Temp 98.2  F (36.8  C) (Oral)   Resp 14   Ht 1.626 m (5' 4\")   Wt 45.4 kg (100 lb)   LMP 06/21/2023   SpO2 97%   BMI 17.16 kg/m    Weight is 100 lbs 0 oz  Body mass index is 17.16 kg/m .    Mental Status Exam:  Appearance: awake, alert  Attitude:  cooperative  Eye Contact:  good  Mood:  anxious, sad  and depressed  Affect:  appropriate and in normal range  Speech:  clear, coherent  Language: fluent and intact in " English  Psychomotor, Gait, Musculoskeletal:  no evidence of tardive dyskinesia, dystonia, or tics  Thought Process:  logical, linear and goal oriented  Associations:  no loose associations  Thought Content:  no evidence of suicidal ideation or homicidal ideation  Insight:  fair  Judgement:  fair  Oriented to:  time, person, and place  Attention Span and Concentration:  fair  Recent and Remote Memory:  fair  Fund of Knowledge:  appropriate         Diagnoses:      Acute alcoholic intoxication in alcoholism with complication (H)  Depression with suicidal ideation         Recommendations:     1.Pt displays the following risk factors that support IP admission: SI when she came to emergency room severe alcohol use disorder  - Continue to recommend inpatient psychiatric hospitalizations for further stabilization   2.  72-hour hold started this morning we will continue to monitor patient for now  3.  Patient interested in psychotherapy for her alcohol use disorder than medication management, patient interested in hydroxyzine for her anxiety  4.  Chemical dependency assessment  5.  Consult psychiatry as needed   treatment per ED team    - Consulted with  [facility], ED physician Dr. Mai  asked if they would like this writer to enter orders in the EHR,  regarding this case.    Please call [facility]/DEC at [phone] if you have follow-up questions or wish to place another consult.  Karla Ellington, Psychiatric Nurse practitioner    Attestation:  Time with:  Patient: 25 minutes  Treatment Team: 30 Minutes  Chart Review: 30 minutes    Total time spent was 85 minutes. Over 50% of times was spent counseling and coordination of care.    I, Karla lElington, CNP, APRN, Psychiatric Nurse Practitioner have personally performed an examination of this patient.  I have edited the note to reflect all relevant changes.  I have discussed this patient with the care team July 6, 2023.  I have reviewed all vitals and laboratory  findings.    Disclaimer: This note consists of symbols derived from keyboarding,

## 2023-07-06 NOTE — PHARMACY-ADMISSION MEDICATION HISTORY
Pharmacist Admission Medication History    Admission medication history is complete. The information provided in this note is only as accurate as the sources available at the time of the update.    Medication reconciliation/reorder completed by provider prior to medication history? No    Information Source(s): Patient and CareEverywhere/SureScripts via in-person    Pertinent Information: Alistair verified her home medications.    Changes made to PTA medication list:    Added: None    Deleted: None    Changed: triamcinolone 0.1% cream -> Apply topically BID PRN    Medication History Completed By: Sabine Bone RPH 7/6/2023 3:24 PM    Prior to Admission medications    Medication Sig Last Dose Taking? Auth Provider Long Term End Date   triamcinolone (KENALOG) 0.1 % external cream Apply topically 2 times daily as needed for irritation  Yes Mindi Stevens, DO

## 2023-07-06 NOTE — TELEPHONE ENCOUNTER
S: [facility] , DEC  Adilia calling at 10:04pm about a 43 year old/Female presenting with SI.     B: Pt arrived via Family. Presenting problem, stressors: Pt is presenting with SI with a plan to drink herself to death. Pt reports this has been going on for over 6 months and only reports SI while drinking. Stressor:  from , unable to see her kids, 2 days ago she quit her job.     Pt affect in ED: Flat  Pt Dx: Major Depressive Disorder, Generalized Anxiety Disorder and Substance Use Disorder: Alcohol  Previous IPMH hx? No  Pt endorses SI with a plan to drink herself to death   Hx of suicide attempt? No  Pt denies SIB  Pt denies HI   Pt denies hallucinations .   Pt RARS Score: 2    Hx of aggression/violence, sexual offenses, legal concerns, Epic care plan? describe: No  Current concerns for aggression this visit? No  Does pt have a history of Civil Commitment? No  Is Pt their own guardian? Yes    Pt is not prescribed medication. Is patient medication compliant? N/A  Pt denies OP services   CD concerns: Actively using/consuming alcohol  Acute or chronic medical concerns: No  Does Pt present with specific needs, assistive devices, or exclusionary criteria? None      Pt is ambulatory  Pt is able to perform ADLs independently      A: Pt to be reviewed for [facility] admission. Pt is Voluntary  Preferred placement: [facility] ONLY    COVID Symptoms: No  If yes, COVID test required   Utox: Ordered, not yet collected   CMP: Abnormalities: AST: 349, ALT: 227  CBC: WNL  HCG: Ordered, not yet collected    R: Patient cleared and ready for behavioral bed placement: Yes  Pt placed on IP worklist? Yes

## 2023-07-06 NOTE — ED NOTES
IP MH Referral Acuity Rating Score (RARS)    LMHP complete at referral to IP MH, with DEC; and, daily while awaiting IP MH placement. Call score to PPS.  CRITERIA SCORING   New 72 HH and Involuntary for IP MH (not adolescent) 0/1   Boarding over 24 hours 0/1   Vulnerable adult at least 55+ with multiple co morbidities; or, Patient age 11 or under 0/1   Suicide ideation without relief of precipitating factors 1/1   Current plan for suicide 0/1   Current plan for homicide 0/1   Imminent risk or actual attempt to seriously harm another without relief of factors precipitating the attempt 0/1   Severe dysfunction in daily living (ex: complete neglect for self care, extreme disruption in vegetative function, extreme deterioration in social interactions) 1/1   Recent (last 2 weeks) or current physical aggression in the ED 0/1   Restraints or seclusion episode in ED 0/1   Verbal aggression, agitation, yelling, etc., while in the ED 0/1   Active psychosis with psychomotor agitation or catatonia 0/1   Need for constant or near constant redirection (from leaving, from others, etc).  0/1   Intrusive or disruptive behaviors 0/1   TOTAL Acuity Total Score: 2     Adilia Daniels, ANA, CONCHIS

## 2023-07-06 NOTE — DISCHARGE INSTRUCTIONS
Aftercare Plan    You have completed a comprehensive substance use evaluation today, 7/6/23, and it is recommended that you engage in Substance Use Treatment. Current plan is to discharge directly to [facility] for Substance Use Treatment.     If I am feeling unsafe or I am in a crisis, I will:   Contact my established care providers   Call the National Suicide Prevention Lifeline: 988  Go to the nearest emergency room   Call 911     Warning signs that I or other people might notice when a crisis is developing for me: thoughts of suicide, harming self or others.     Things I am able to do on my own to cope or help me feel better:   Reduce Extreme Emotion  QUICKLY:  Changing Your Body Chemistry      T:  Change your body Temperature to change your autonomic nervous system   Use Ice Water to calm yourself down FAST   Put your face in a bowl of ice water (this is the best way; have the person keep his/her face in ice water for 30-45 seconds - initial research is showing that the longer s/he can hold her/his face in the water, the better the response), or   Splash ice water on your face, or hold an ice pack on your face      I:  Intensely exercise to calm down a body revved up by emotion   Examples: running, walking fast, jumping, playing basketball, weight lifting, swimming, calisthenics, etc.   Engage in exercises that DO NOT include violent behaviors. Exercises that utilize violent behaviors tend to function as  behavioral rehearsal,  and rather than calming the person down, may actually  rev  the person up more, increasing the likelihood of violence, and lessening the likelihood that they will  burn off  energy     P:  Progressively relax your muscles   Starting with your hands, moving to your forearms, upper arms, shoulders, neck, forehead, eyes, cheeks and lips, tongue and teeth, chest, upper back, stomach, buttocks, thighs, calves, ankles, feet   Tense (10 seconds,   of the way), then relax each muscle (all the  "way)   Notice the tension   Notice the difference when relaxed (by tensing first, and then relaxing, you are able to get a more thorough relaxation than by simply relaxing)     P: Paced breathing to relax   The standard technique is to begin with counting the number of steps one takes for a typical inhale, then counting the steps one takes for a typical exhale, and then lengthening the amount of steps for the exhalation by one or two steps.  OR  Repeat this pattern for 1-2 minutes  Inhale for four (4) seconds   Exhale for six (6) to eight (8) seconds   Research demonstrated that one can change one's overall level of anxiety by doing this exercise for even a few minutes per day      Things that I am able to do with others to cope or help me better: be around other people who are supportive, go for a walk, listen to music.      Changes I can make to support my mental health and wellness: maintain a regular eating and sleeping schedule, prioritize substance use treatment, eventually consider long-term outpatient mental health therapy.      People in my life that I can ask for help: Spouse, crisis lines, doctor     [facility] has a mental health crisis team you can call 24/7: [facility] Crisis  [phone] (adults)  [phone] (children)    Additional resources and information:         Crisis Lines  Crisis Text Line  Text 051046  You will be connected with a trained live crisis counselor to provide support.    Por espanol, texto  WATSON a 727028 o texto a 442-AYUDAME en WhatsApp    [employer] (LGBTQ Youth Crisis Line)  9.330.487.0616  text START to 424-576      Community Resources  Fast Tracker  Linking people to mental health and substance use disorder resources  fasttrackermn.org     [place] Mental Health Warm Line  Peer to peer support  Monday thru Saturday, 12 pm to 10 pm  762.468.8015 or 1.783.754.6313  Text \"Support\" to 65998    National [place] [employer] Mental [employer]ness (CUCA)  " 435.956.9153 or 1.888.CUCA.HELPS      Mental Health Apps  My3  https://Migo.mepp.org/    VirtualHopeBox  https://Brandfolder/apps/virtual-hope-box/      Additional Information  Today you were seen by a licensed mental health professional through Triage and Transition services, [facility] (P)  for a crisis assessment in the Emergency Department at [facility].  It is recommended that you follow up with your established providers (psychiatrist, mental health therapist, and/or primary care doctor - as relevant) as soon as possible. Coordinators from [facility] will be calling you in the next 24-48 hours to ensure that you have the resources you need.  You can also contact [facility] coordinators directly at [phone]. You may have been scheduled for or offered an appointment with a mental health provider. [facility] maintains an extensive network of licensed behavioral health providers to connect patients with the services they need.  We do not charge providers a fee to participate in our referral network.  We match patients with providers based on a patient's specific needs, insurance coverage, and location.  Our first effort will be to refer you to a provider within your care system, and will utilize providers outside your care system as needed.

## 2023-07-06 NOTE — TELEPHONE ENCOUNTER
[facility] Access Inpatient Bed Call Log 7/6/2023 3:11 AM      Intake has called facilities that have not updated their bed status within the last 12 hours.     Adults:         [facility] is posting 0 beds.      [facility] is posting 0 beds. [phone]      [facility] is posting 0 beds. [phone]     [facility] is posting 0 beds. [phone]     [facility] is posting 0 beds. [phone]     [facility] is posting 0 bed. [phone]      [facility] is posting 0 beds. [phone]     [facility] is posting 0 beds. [phone]     [facility] is posting 0 beds. [phone]     [facility] is posting 0 beds. [phone]       [facility] is posting 2 beds. Mixed unit 12+. Low acuity only.  [phone]     [facility] is posting 0 beds. No aggression.  [phone]     [facility] is posting 0 beds. [phone]      [facility] is posting 0 beds. [phone]      [facility] is posting 1 bed. [phone]      [facility] is posting 0 beds. Low acuity. [phone]     [facility] is posting 0 beds. 72 hr hold preferred. [phone]     [facility] is posting 2 bed.  [phone]        [facility] is posting 2 bed. Voluntary only- no holds/commitments, No Hx of aggression, violence, or assault. No sexual offenders. No 72 hr holds. [phone]     [facility] is posting 2 beds. (Must have the cognitive ability to do programming. No aggressive or violent behavior or recent HX in the last 2 yrs. MH must be primary.) [phone]    [facility] is posting 9 beds. Low acuity only. Violence and aggression capped. [phone]      [facility] is posting 2 bed. Low acuity, Neg Covid. [phone]     [facility] is posting 3 beds. Covid neg. Vol only. Combined adolescent and adult unit. No aggressive or violent behavior. No registered sex offenders. [phone]  [phone] - 12:40am Per Uma, they will have a bed in the AM.     [place] [facility] [employer] posting 2 beds. Negative covid.      [facility] is posting 12 beds. Call for details. [phone]      [facility] is posting 1 beds. [phone]      Pt remains on work list pending appropriate bed availability.

## 2023-07-06 NOTE — ED PROVIDER NOTES
ED Provider Note  [facility]      History     Chief Complaint   Patient presents with     Alcohol Problem     Suicidal     With plan to cut her throat     HPI  Alistair Huggins is a 43 year old female who presents seeking alcohol treatment. She is also experiencing depression with suicidal ideation. She has been drinking heavily since age 14. She has never been through treatment. She has been drinking upwards of 1 liter of liquor daily. Last drink earlier today. She experiences anxiety with withdrawal and may have had a seizure like event last week for which she was seen at [facility] [place]. She is  from her . She has 2 children ages 11 and 13 who are living with her ex partner. She is currently living alone. She quit her job a few days ago.She has worked in  in the past.  She has been having suicidal thoughts with thoughts of drinking herself to death or cutting herself. She denies other substance use. She cannot contract for safety at present. She denies past medical problems. She denies family history of mental illness. She denies past suicide attempts. She denies past treatment for alcohol or depression.    History reviewed. No pertinent past medical history.    History reviewed. No pertinent surgical history.    Family History   Problem Relation Age of Onset     Kidney Disease Mother 50        kidney failure     Diabetes Father      Cerebrovascular Disease Paternal Grandmother        Social History     Tobacco Use     Smoking status: Some Days     Types: Cigarettes     Smokeless tobacco: Never   Substance Use Topics     Alcohol use: Yes     Comment: 5-6         Review of Systems   Constitutional: Negative for chills and fever.   HENT: Negative for congestion.    Eyes: Negative for visual disturbance.   Respiratory: Negative for cough, shortness of breath and wheezing.    Cardiovascular: Negative for chest pain.   Gastrointestinal: Negative for abdominal pain,  "nausea and vomiting.   Genitourinary: Negative for difficulty urinating.   Musculoskeletal: Negative for gait problem and neck pain.   Neurological: Negative for speech difficulty, numbness and headaches.   Hematological: Negative for adenopathy.   Psychiatric/Behavioral: Positive for dysphoric mood and suicidal ideas. Negative for confusion.       Physical Exam   BP: 113/79  Pulse: 116  Temp: 97.7  F (36.5  C)  Resp: 20  Height: 162.6 cm (5' 4\")  Weight: 45.4 kg (100 lb)  SpO2: 99 %  Physical Exam  Vitals and nursing note reviewed.   Constitutional:       General: She is not in acute distress.     Appearance: Normal appearance.   HENT:      Head: Normocephalic and atraumatic.      Right Ear: External ear normal.      Left Ear: External ear normal.      Nose: Nose normal.      Mouth/Throat:      Mouth: Mucous membranes are moist.   Eyes:      General: No scleral icterus.     Extraocular Movements: Extraocular movements intact.      Pupils: Pupils are equal, round, and reactive to light.   Cardiovascular:      Rate and Rhythm: Normal rate and regular rhythm.      Heart sounds: No murmur heard.  Pulmonary:      Effort: Pulmonary effort is normal.      Breath sounds: Normal breath sounds. No wheezing.   Abdominal:      General: Abdomen is flat.      Palpations: Abdomen is soft.      Tenderness: There is no abdominal tenderness.   Musculoskeletal:      Cervical back: Normal range of motion and neck supple.      Right lower leg: No edema.      Left lower leg: No edema.   Skin:     General: Skin is warm and dry.   Neurological:      General: No focal deficit present.      Mental Status: She is alert and oriented to person, place, and time.      Cranial Nerves: No cranial nerve deficit.      Motor: No weakness.   Psychiatric:         Attention and Perception: Attention normal.         Mood and Affect: Mood is depressed. Affect is tearful.         Speech: Speech normal.         Behavior: Behavior is withdrawn.         " Thought Content: Thought content is not paranoid. Thought content includes suicidal ideation. Thought content does not include homicidal ideation. Thought content includes suicidal plan.         Cognition and Memory: Cognition normal.         Judgment: Judgment is impulsive.           ED Course, Procedures, & Data      Procedures         Mental Health Risk Assessment      PSS-3    Date and Time Over the past 2 weeks have you felt down, depressed, or hopeless? Over the past 2 weeks have you had thoughts of killing yourself? Have you ever attempted to kill yourself? When did this last happen? User   07/05/23 1939 yes yes yes between 1 and 6 months ago MQT      C-SSRS ([place])    Date and Time Q1 Wished to be Dead (Past Month) Q2 Suicidal Thoughts (Past Month) Q3 Suicidal Thought Method Q4 Suicidal Intent without Specific Plan Q5 Suicide Intent with Specific Plan Q6 Suicide Behavior (Lifetime) Within the Past 3 Months? RETIRED: Level of Risk per Screen Screening Not Complete User   07/05/23 1939 yes yes yes no no yes -- -- -- MQT              Suicide assessment completed by mental health (D.EKyungC., LCSW, etc.)       Results for orders placed or performed during the hospital encounter of 07/05/23   INR     Status: Normal   Result Value Ref Range    INR 0.96 0.85 - 1.15   Comprehensive metabolic panel     Status: Abnormal   Result Value Ref Range    Sodium 141 136 - 145 mmol/L    Potassium 4.0 3.4 - 5.3 mmol/L    Chloride 101 98 - 107 mmol/L    Carbon Dioxide (CO2) 27 22 - 29 mmol/L    Anion Gap 13 7 - 15 mmol/L    Urea Nitrogen 6.7 6.0 - 20.0 mg/dL    Creatinine 0.60 0.51 - 0.95 mg/dL    Calcium 9.1 8.6 - 10.0 mg/dL    Glucose 88 70 - 99 mg/dL    Alkaline Phosphatase 41 35 - 104 U/L     (H) 0 - 45 U/L     (H) 0 - 50 U/L    Protein Total 8.2 6.4 - 8.3 g/dL    Albumin 5.1 3.5 - 5.2 g/dL    Bilirubin Total 0.3 <=1.2 mg/dL    GFR Estimate >90 >60 mL/min/1.73m2   Lipase     Status: Abnormal   Result Value Ref  Range    Lipase 73 (H) 13 - 60 U/L   Ethyl Alcohol Level     Status: Abnormal   Result Value Ref Range    Alcohol ethyl 0.31 (HH) <=0.01 g/dL   CBC with platelets and differential     Status: None   Result Value Ref Range    WBC Count 4.1 4.0 - 11.0 10e3/uL    RBC Count 4.45 3.80 - 5.20 10e6/uL    Hemoglobin 14.6 11.7 - 15.7 g/dL    Hematocrit 43.3 35.0 - 47.0 %    MCV 97 78 - 100 fL    MCH 32.8 26.5 - 33.0 pg    MCHC 33.7 31.5 - 36.5 g/dL    RDW 12.5 10.0 - 15.0 %    Platelet Count 219 150 - 450 10e3/uL    % Neutrophils 44 %    % Lymphocytes 41 %    % Monocytes 10 %    % Eosinophils 4 %    % Basophils 1 %    % Immature Granulocytes 0 %    NRBCs per 100 WBC 0 <1 /100    Absolute Neutrophils 1.8 1.6 - 8.3 10e3/uL    Absolute Lymphocytes 1.7 0.8 - 5.3 10e3/uL    Absolute Monocytes 0.4 0.0 - 1.3 10e3/uL    Absolute Eosinophils 0.2 0.0 - 0.7 10e3/uL    Absolute Basophils 0.0 0.0 - 0.2 10e3/uL    Absolute Immature Granulocytes 0.0 <=0.4 10e3/uL    Absolute NRBCs 0.0 10e3/uL   Alcohol breath test POCT     Status: Abnormal   Result Value Ref Range    Alcohol Breath Test 0.288 (A) 0.00 - 0.01   CBC with platelets differential     Status: None    Narrative    The following orders were created for panel order CBC with platelets differential.  Procedure                               Abnormality         Status                     ---------                               -----------         ------                     CBC with platelets and d...[[id number]]                      Final result                 Please view results for these tests on the individual orders.     Medications   diazepam (VALIUM) tablet 5-20 mg (has no administration in time range)   atenolol (TENORMIN) tablet 50 mg (has no administration in time range)   thiamine (B-1) tablet 100 mg (has no administration in time range)   folic acid (FOLVITE) tablet 1 mg (has no administration in time range)   multivitamin w/minerals (THERA-VIT-M) tablet 1 tablet (has no  administration in time range)   nicotine polacrilex (NICORETTE) gum 4 mg (4 mg Buccal $Given 7/5/23 2212)   thiamine (B-1) tablet 100 mg (100 mg Oral $Given 7/5/23 2235)   multivitamin w/minerals (THERA-VIT-M) tablet 1 tablet (1 tablet Oral $Given 7/5/23 2235)       No orders to display          Critical care was not performed.     Medical Decision Making  The patient's presentation was of high complexity (an acute health issue posing potential threat to life or bodily function).    The patient's evaluation involved:  ordering and/or review of 3+ test(s) in this encounter (see separate area of note for details)    The patient's management necessitated high risk (a decision regarding hospitalization).      Assessment & Plan    Impression:  Middle aged woman with a history of alcohol abuse dating back to adolescence. She presents seeking alcohol detoxification. She also reports depression and anxiety with suicidal ideation with plan. She has multiple psychosocial risk factors. She has  from her  and her 2 children are living with the father. She recently quit her job. She is living alone. She is drinking heavily and her suicidal thoughts are worse when drinking. She has been increasingly isolating herself.  She has blood alcohol of 0.288 on presentation. Labs are notable for moderate elevation of AST and ALT, both int he 300 range. CBC and electrolytes are otherwise normal. She was evaluated in the ED by the DEC . Please refer to their separate assessment. Both I and the DEC  are in agreement that she needs combined MI/CD treatment. She appears to present a relatively high risk for self harm if discharged. She is currently willing to be admitted on voluntary baseis.     I have reviewed the nursing notes. I have reviewed the findings, diagnosis, plan and need for follow up with the patient.    New Prescriptions    No medications on file       Final diagnoses:   Acute alcoholic  intoxication in alcoholism with complication (H)   Depression with suicidal ideation       Michoacano Terry  [facility] EMERGENCY DEPARTMENT  7/5/2023     Michoacano Terry MD  07/06/23 0127

## 2023-07-06 NOTE — ED NOTES
Pt c/o long wait time in ED and requesting to leave. Pt notified unable to leave until discharge's by MD. Notified Dr. Terry. Per Dr. Terry he will put pt on hold. Pt notified. Offered for pt possibility of going to [facility]. Pt declined.

## 2023-07-06 NOTE — TELEPHONE ENCOUNTER
R:  [place]  Access Inpatient Bed Call Log 7/6/23 @ 7:04 AM  ([facility] Only)  Intake has called facilities that have not updated their bed status within the last 12 hours.                [facility] is at capacity.            Patient remains on waitlist pending appropriate bed availability.

## 2023-07-06 NOTE — ED TRIAGE NOTES
"Patient was brought to ED by her . Patient said she been drinking \"5-6 drinks easy with hard alcohol more on my days off\". Last drink was about 30 minutes ago.      Triage Assessment     Row Name 07/05/23 1936       Triage Assessment (Adult)    Airway WDL WDL       Respiratory WDL    Respiratory WDL WDL       Skin Circulation/Temperature WDL    Skin Circulation/Temperature WDL WDL       Cardiac WDL    Cardiac WDL WDL       Peripheral/Neurovascular WDL    Peripheral Neurovascular WDL WDL       Cognitive/Neuro/Behavioral WDL    Cognitive/Neuro/Behavioral WDL WDL              "

## 2023-07-06 NOTE — TELEPHONE ENCOUNTER
R: 4:08pm- Per EC, Pt is planning to discharge straight to [facility] hopefully tomorrow.  Plans to admit to detox tonight.  No longer needing IP MH.

## 2023-07-06 NOTE — CONSULTS
Pt completed her Comprehensive assessment with writer and has been added to the insurance authorization sheet for [facility]. Pt could possibly admit to the treatment program once she is out of detox, and insurance is approved. Pt. Is currently resting in the ED and is looking forward to treatment and getting her life back on track.

## 2023-07-06 NOTE — ED NOTES
SIGN-OUT:  - Assumed care of this patient from Dr. Leger  - Pending at shift change: awaiting [facility]   - Tentative plan from original EM Physician: Patient was initially MICD though psych has evaluated her and felt that she only needs chemical dependency.  She also had a [facility]M [facility] consult.  Patient is set up for [facility] admission at tomorrow morning at 9 AM.  She is denying suicidal ideation or plan.  She is on the [facility] protocol and will be admitted to detox overnight until her bed is available at [facility].    UPDATES / REASSESSMENT:  Results:   -Patient had a mental health assessment.  They do not feel that she was acutely suicidal and that she did not need MICD and only needed alcohol treatment.  Patient agrees to me that she does not feel suicidal and feels that her bigger issue is her drinking.  Reassessment:   -Patient denies suicidal ideation or plan.  --- No acute issues or interventions necessary while still in the emergency department.    DISPOSITION:  Detox    MD Pau Cesar, Simon Rodríguez MD  07/06/23 6282

## 2023-07-06 NOTE — CONSULTS
Mental Health Transition and Triage-Extended Care  Civil Commitment Status Plan of Care      Writer consulted with Karla Ellington, Psych NP, regarding this patient and they are in agreement that at this time Alistair Huggins does not meet criteria for civil commitment.     Recommendations/Plan:      Extended [facility] to continue to follow for support for disposition.    Request review of commitment needs if matters change regarding this case by calling Extended Care at [phone].    ANA DUNCAN, [facility] Mer[facility] HospitalSW, Psychotherapist  Extended Care- Triage & Transition Services  Callback: [phone]

## 2023-07-06 NOTE — TELEPHONE ENCOUNTER
S: [facility] , Provider Pau calling at 3:41 PM with clinical on a 43 year old/Female presenting for alcohol detox.     B: Pt presents for ETOH detox. Been drinking a Liter a day . Presented initially with SI, seen by [facility]..deemed good for detox  Currently reports drinking liter of vodka daily for years.    Patient reports last use was prior to arrival.  Pt SONIA: 0.31  Pt  denies hx of DT  Pt  endorses hx of seizures. Last seizure: seizure like event a week ago  Pt endorsing the following symptoms of withdrawal: Shaky, Nausea and agitation  MSSA Score: 4    Pt denies acute mental health or medical concerns.   Pt denies other drug use: None Amount/frequency: N/A    Does Pt have a detox care plan in [facility]? No  Does pt present with specific needs, assistive devices, or exclusionary criteria? None  Is the patient ambulating, eating and drinking in the ED? Yes    A: Pt meets criteria to be presented for IP detox admission. Patient is voluntary    COVID Symptoms: No  If yes, COVID test required   Utox: Ordered, not yet collected  CMP: Abnormalities: .   CBC: WNL  HCG: Ordered, not yet collected     R: Patient cleared and ready for behavioral bed placement: Yes    Pt is meeting criteria for presentation to 3A/CD     4:01p Intake called [facility] ED CRN informing that pt is not medically cleared for IP Detox due to AST at 349. Detox only admits AST and ALT's below 300. Intake also informed Utox and HCG. Pt has been removed from Detox work list until pt is medically cleared for placement.

## 2023-07-06 NOTE — CONSULTS
"Diagnostic Evaluation Consultation  Crisis Assessment    Patient was assessed: In Person  Patient location: declocations: [facility] Adult Emergency Department  Was a release of information signed: No. Reason: No established providers      Referral Data and Chief Complaint  Alistair is a 43 year old, who uses she/her pronouns, and presents to the ED with family/friends. Patient is referred to the ED by self. Patient is presenting to the ED for the following concerns: alcohol use concerns and suicidal ideation.      Informed Consent and Assessment Methods     Patient is her own guardian. Writer met with patient and explained the crisis assessment process, including applicable information disclosures and limits to confidentiality, assessed understanding of the process, and obtained consent to proceed with the assessment. Patient was observed to be able to participate in the assessment as evidenced by walking to consultation room and verbal consent . Assessment methods included conducting a formal interview with patient, review of medical records, collaboration with medical staff, and obtaining relevant collateral information from family and community providers when available..     Over the course of this crisis assessment provided reassurance, offered validation, engaged patient in problem solving and disposition planning, assisted in processing patient's thoughts and feeling relating to recently quitting job and provided psychoeducation. Patient's response to interventions was positive.     Summary of Patient Situation     Alistair told writer that she was supposed to start substance use treatment today through [facility], after completing an assessment at [facility] [place] a couple of days ago. She said that she drank today so she was not able to attend her program. Alistair also told writer she is having \"suicidal tendencies\". Per chart review, Alistair was trying to get into [facility], but it looks like " "[facility] did not complete a full assessment and [facility] stated she needed to book an assessment appointment. Alistair reported having 5-6 \"hard liquor cocktails\" over the last day. Her alcohol level when entering the emergency department was .288    Alistair told writer that she feels that she has a long history of alcohol use. She said that it started to get worse when she moved from [place] to [place] four years ago after her  had a stroke to be closer to his family. Her recent separation from her  (due to alcohol use), not being able to see her kids regularly, feeling isolated, and quitting her job two days ago has increased her alcohol intake even more over the last 6 months. Alistair said \"all of the bricks fell\" and she no longer knows what to do or where to get support. She said that she is feeling incredibly anxious and nervous about feeling isolated and feeling like she is a bad mom because of her alcohol use.    Her suicidal thoughts also started around 6 months ago. She said that she would \"drink myself to death\" but has not intentionally tried this previously. She said that her suicidal thoughts have also \"escalated\" over the last 6 months.      Alistair told writer that she has accepted that she will need to go through treatment to become sober so she \"can fix my marriage and see my kids again\".     She was calm and cooperative throughout the assessment and was appreciative of being able to talk with writer.    Brief Psychosocial History     Alistair grew up in [place] and moved to [place] four years ago. Right now she feels that she is isolated and lacks a support system in [place]. She said that her 's family and friends are here, but since she is currently  from him, she feels that she is not supported by them. She also has two children ages 11 and 12. She told writer that she will still talk to her dad sometimes who lives in [place]. 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    She did not report any relevant legal or financial concerns.     Alistair did not share any spiritual or Restoration influences on her mental health.    Significant Clinical History     Alistair told writer that she has never been diagnosed with a mental health disorder previously. She does not have a history of inpatient psychiatric care or psychiatric medications. She told writer that she had to attend therapy as part of her brother's probation but she has not attended individual/personal therapy.     Alistair reported a family history of substance use. She said that her mom passed away from an alcohol use disorder and that her dad is in recovery from substance use. She said that she has to live with another family when she was 12 years old due to her parents' substance use.     Alistair reported feeling \"grateful and joyful\" before moving to [place]. She now reports feeling anxious about \"messing up my life\", \"feeling like a bad mom\", and \"feeling so ashamed of myself\". She told writer that she is isolated and does not have anyone to support her. She said that she had to quit her job because too many members of her 's family was hired there and she felt like the environment became too hostile towards her due to her and her 's separation.     Alistair does not have a history of substance use treatment. She was at [facility] on July 2, 2023 presenting with \"withdrawal\" symptoms and then went to detox at [address] [place] [address]. Alistair told writer that she does not believe she was going through withdrawal and instead was most likely experiencing a panic attack. She said that she has been experiencing more panic attack lately.     Alistair has suicidal thoughts when she is drinking. She said that he plan is to \"drink herself to death\". However, she does not feel this way when she is " "sober and has not intentionally tried to cause her death through alcohol before, nor does she have a history of any other suicide attempts.    Alistair told writer that she struggles to sleep unless she has been drinking otherwise she said her \"mind races and I cannot sleep\". She reported that her appetite has been normal.    Alistair denied homicidal ideation, non suicidal self injurious behavior, and hallucinations.      Collateral Information    Alistair's only contact is her , Kayode. She told writer that she did not want writer to call because she has been in contact with him.     Risk Assessment    Sibley Suicide Severity Rating Scale Full Clinical Version: 7/5/2023  Suicidal Ideation  1. Wish to be Dead (Lifetime): Yes  1. Wish to be Dead (Past 1 Month): Yes  2. Non-Specific Active Suicidal Thoughts (Lifetime): Yes  2. Non-Specific Active Suicidal Thoughts (Past 1 Month): Yes  3. Active Suicidal Ideation with any Methods (Not Plan) Without Intent to Act (Lifetime): Yes  3. Active Suicidal Ideation with any Methods (Not Plan) Without Intent to Act (Past 1 Month): Yes  4. Active Suicidal Ideation with Some Intent to Act, Without Specific Plan (Lifetime): No  4. Active Suicidal Ideation with Some Intent to Act, Without Specific Plan (Past 1 Month): No  5. Active Suicidal Ideation with Specific Plan and Intent (Lifetime): No  5. Active Suicidal Ideation with Specific Plan and Intent (Past 1 Month): No  Intensity of Ideation  Most Severe Ideation Rating (Lifetime): 3  Most Severe Ideation Rating (Past 1 Month): 3  Frequency (Lifetime): 2-5 times in week  Frequency (Past 1 Month): Daily or almost daily  Duration (Lifetime): 1-4 hours/a lot of time  Duration (Past 1 Month): 1-4 hours/a lot of time  Controllability (Lifetime): Can control thoughts with some difficulty  Controllability (Past 1 Month): Can control thoughts with some difficulty  Deterrents (Lifetime): Uncertain that deterrents stopped " "you  Deterrents (Past 1 Month): Uncertain that deterrents stopped you  Reasons for Ideation (Lifetime): Completely to end or stop the pain (You couldn't go on living with the pain or how you were feeling)  Reasons for Ideation (Past 1 Month): Completely to end or stop the pain (You couldn't go on living with the pain or how you were feeling)  Suicidal Behavior  Actual Attempt (Lifetime): No  Has subject engaged in non-suicidal self-injurious behavior? (Lifetime): No  Interrupted Attempts (Lifetime): No  Aborted or Self-Interrupted Attempt (Lifetime): No  Preparatory Acts or Behavior (Lifetime): No  C-SSRS Risk (Lifetime/Recent)  Calculated C-SSRS Risk Score (Lifetime/Recent): Moderate Risk     Validity of evaluation is impacted by presenting factors during interview alcohol level of .288 two hours prior to assessment.   Comments regarding subjective versus objective responses to Reeves tool: Writer believe subjective and objective responses are equal.  Environmental or Psychosocial Events: loss of relationship due to divorce/separation, geographic isolation from supports, helplessness/hopelessness, other life stressors, ongoing abuse of substances and social isolation  Chronic Risk Factors: history of abuse or neglect, parental mental health issue and parental substance abuse issue   Warning Signs: hopelessness, increasing substance use or abuse and withdrawing from friends, family, and society  Protective Factors: responsibilities and duties to others, including pets and children, lives in a responsibly safe and stable environment, sense of importance of health and wellness, help seeking and optimistic outlook - identification of future goals  Interpretation of Risk Scoring, Risk Mitigation Interventions and Safety Plan:  Alistair told writer that she only has suicidal thoughts when she is consuming alcohol with a plan to \"drink myself to death\". She does not have a history of suicide attempts nor has she " intentionally tried to consume alcohol to level of death before. However, she is noticing an increase in suicidal thoughts and her alcohol consumption. Due to the fact that Alistair has these thoughts when she might not be able to make informed and logical decisions and does not have support around her, writer believes she is a moderate to high risk for suicide.         Does the patient have thoughts of harming others? No     Is the patient engaging in sexually inappropriate behavior?  no        Current Substance Abuse     Is there recent substance abuse? no     Was a urine drug screen or blood alcohol level obtained: Yes alcohol level of .288       Mental Status Exam     Affect: Flat   Appearance: Appropriate    Attention Span/Concentration: Attentive  Eye Contact: Engaged   Fund of Knowledge: Appropriate    Language /Speech Content: Fluent   Language /Speech Volume: Normal    Language /Speech Rate/Productions: Normal and Pressured    Recent Memory: Intact   Remote Memory: Intact   Mood: Anxious and Sad    Orientation to Person: Yes    Orientation to Place: Yes   Orientation to Time of Day: Yes    Orientation to Date: Yes    Situation (Do they understand why they are here?): Yes    Psychomotor Behavior: Normal    Thought Content: Clear   Thought Form: Intact      History of commitment: No     Medication    Psychotropic medications: No  Medication changes made in the last two weeks: No       Current Care Team    Primary Care Provider: No  Psychiatrist: No  Therapist: No  : No     CTSS or ARMHS: No  ACT Team: No  Other: No      Diagnosis    300.00 (F41.9) Unspecified Anxiety Disorder   311 (F32.9) Unspecified Depressive Disorder      Substance-Related & Addictive Disorders Alcohol Use Disorder   303.90 (F10.20) Moderate by history       Clinical Summary and Substantiation of Recommendations    Alistair has experienced multiple recent life stressors including  from her , not being able  "to see her children, and quitting her job. She has increased her alcohol consumption and stated that she is ready to go through treatment do she can repair her marriage. She is having suicidal thoughts that \"have escalated\" as they have been happening more frequently when she is drinking, which has also increased. She is experiencing recent panic attacks and feels isolated. She is not able to hold a job a the moment due to her life stressors. Alistair would benefit from a MI/CD inpatient stay.   Admission to Inpatient Level of Care is indicated due to:    1. Patient risk of severity of behavioral health disorder is appropriate to proposed level of care as indicated by:    Imminent Risk of Harm: Current plan for suicide or serious harm to self is present  And/or:  Behavioral health disorder is present and appropriate for inpatient care with both of the following:     Severe psychiatric, behavioral or other comorbid conditions are appropriate for management at inpatient mental health as indicated by at least one of the following:   o Comorbid substance use disorder    Severe dysfunction in daily living is present as indicated by at least one of the following:   o Extreme deterioration in social interactions and Complete inability to maintain any appropriate aspect of personal responsibility in any adult roles    2. Inpatient mental health services are necessary to meet patient needs and at least one of the following:  Specific condition related to admission diagnosis is present and judged likely to further improve at proposed level of care    3. Situation and expectations are appropriate for inpatient care, as indicated by one of the following:   Patient management/treatment at lower level of care is not feasible or is inappropriate    Disposition    Recommended disposition: Inpatient Mental Health       Reviewed case and recommendations with attending provider. Attending Name: Dr. Terry       Attending " concurs with disposition: Yes       Patient and/or validated legal guardian concurs with disposition: Yes       Final disposition: Inpatient mental health .     Inpatient Details (if applicable):   Is patient admitted voluntarily:Yes      Patient aware of potential for transfer if there is not appropriate placement? NA       Patient is willing to travel outside of the [facility] for placement? No      Behavioral Intake Notified? Yes: Date: 7/5/2023 Time: 10:02 PM.     Assessment Details    Patient interview started at: 9:06 PM and completed at: 9:42 PM.     Total time spent with the patient or their family: .50 hrs      CPT code(s) utilized: 24626 - Psychotherapy for Crisis - 60 (30-74*) min       Adilia Daniels Psychotherapist Trainee, Psychotherapist  [facility] - Triage & Transition Services  Callback: [phone]

## 2023-07-06 NOTE — PLAN OF CARE
"Alistair Huggins  July 5, 2023  Plan of Care Hand-off Note     Patient Care Path: Inpatient Mental Health    Plan for Care:     Alistair is on the list for a MI/CD inpatient bed. She is aware of a possible transfer to [facility] and that extended care will follow her until she is placed upstairs or has stabilized. She is concerned about starting medications. She would like to meet with a medication management provider because \"there might be something she is missing out on\", but also told writer that the idea of medications make her nervous. Writer told her that she can meet with the medication management provider and discuss the options and concerns with them as they would have more accurate information than writer.     A CD consult has also been recommended while Alistair is waiting for an inpatient bed. Writer coordinated with Coordinator, Estephania for this consult.     Critical Safety Issues: No    Overview:  This patient is a child/adolescent: No    This patient has additional special visitor precautions: No    Legal Status: Voluntary    Contacts:   Kayode Huggins, : Contact [phone]    Psychiatry Consult:  Adult Psychiatry Consult requested related to medication management while waiting for inpatient . Psychiatry IP Consult Order Placed: Yes    Updated Attending Provider regarding plan of care.    ANA Atkins, CONCHIS    "

## 2023-07-07 ENCOUNTER — TRANSFERRED RECORDS (OUTPATIENT)
Dept: HEALTH INFORMATION MANAGEMENT | Facility: CLINIC | Age: 43
End: 2023-07-07
Payer: COMMERCIAL

## 2023-07-07 ENCOUNTER — HOSPITAL ENCOUNTER (OUTPATIENT)
Dept: BEHAVIORAL HEALTH | Facility: CLINIC | Age: 43
Discharge: HOME | End: 2023-07-07
Attending: FAMILY MEDICINE
Payer: COMMERCIAL

## 2023-07-07 VITALS
DIASTOLIC BLOOD PRESSURE: 71 MMHG | WEIGHT: 100 LBS | SYSTOLIC BLOOD PRESSURE: 115 MMHG | HEIGHT: 64 IN | TEMPERATURE: 98.1 F | OXYGEN SATURATION: 98 % | RESPIRATION RATE: 16 BRPM | BODY MASS INDEX: 17.07 KG/M2 | HEART RATE: 72 BPM

## 2023-07-07 PROBLEM — F19.20 CHEMICAL DEPENDENCY (H): Status: ACTIVE | Noted: 2023-07-07

## 2023-07-07 LAB — HOLD SPECIMEN: NORMAL

## 2023-07-07 PROCEDURE — 250N000013 HC RX MED GY IP 250 OP 250 PS 637: Performed by: INTERNAL MEDICINE

## 2023-07-07 PROCEDURE — H2035 A/D TX PROGRAM, PER HOUR: HCPCS

## 2023-07-07 PROCEDURE — 1002N00001 HC LODGING PLUS FACILITY CHARGE ADULT

## 2023-07-07 RX ORDER — LANOLIN ALCOHOL/MO/W.PET/CERES
100 CREAM (GRAM) TOPICAL DAILY
COMMUNITY
End: 2023-08-04

## 2023-07-07 RX ORDER — FOLIC ACID 1 MG/1
1 TABLET ORAL DAILY
COMMUNITY
End: 2023-08-04

## 2023-07-07 RX ORDER — ACETAMINOPHEN 325 MG/1
325-650 TABLET ORAL EVERY 4 HOURS PRN
COMMUNITY
End: 2023-07-30

## 2023-07-07 RX ORDER — LORATADINE 10 MG/1
10 TABLET ORAL DAILY PRN
COMMUNITY
End: 2023-07-30

## 2023-07-07 RX ORDER — GUAIFENESIN 200 MG/10ML
200 LIQUID ORAL EVERY 4 HOURS PRN
COMMUNITY
End: 2023-07-30

## 2023-07-07 RX ORDER — TRIAMCINOLONE ACETONIDE 1 MG/G
CREAM TOPICAL 2 TIMES DAILY
Qty: 45 G | Refills: 2 | Status: SHIPPED | OUTPATIENT
Start: 2023-07-07

## 2023-07-07 RX ORDER — AMOXICILLIN 250 MG
2 CAPSULE ORAL DAILY PRN
COMMUNITY
End: 2023-07-30

## 2023-07-07 RX ORDER — IBUPROFEN 200 MG
200-400 TABLET ORAL EVERY 6 HOURS PRN
COMMUNITY
End: 2023-07-30

## 2023-07-07 RX ORDER — MULTIVITAMIN,THERAPEUTIC
1 TABLET ORAL DAILY
COMMUNITY
End: 2023-08-04

## 2023-07-07 RX ORDER — LANOLIN ALCOHOL/MO/W.PET/CERES
3 CREAM (GRAM) TOPICAL
COMMUNITY
End: 2023-07-30

## 2023-07-07 RX ORDER — MAGNESIUM HYDROXIDE/ALUMINUM HYDROXICE/SIMETHICONE 120; 1200; 1200 MG/30ML; MG/30ML; MG/30ML
30 SUSPENSION ORAL EVERY 6 HOURS PRN
COMMUNITY
End: 2023-07-30

## 2023-07-07 RX ADMIN — MULTIPLE VITAMINS W/ MINERALS TAB 1 TABLET: TAB at 08:37

## 2023-07-07 RX ADMIN — FOLIC ACID 1 MG: 1 TABLET ORAL at 08:37

## 2023-07-07 RX ADMIN — THIAMINE HCL TAB 100 MG 100 MG: 100 TAB at 08:37

## 2023-07-07 ASSESSMENT — ANXIETY QUESTIONNAIRES
7. FEELING AFRAID AS IF SOMETHING AWFUL MIGHT HAPPEN: NOT AT ALL
5. BEING SO RESTLESS THAT IT IS HARD TO SIT STILL: SEVERAL DAYS
3. WORRYING TOO MUCH ABOUT DIFFERENT THINGS: SEVERAL DAYS
1. FEELING NERVOUS, ANXIOUS, OR ON EDGE: SEVERAL DAYS
GAD7 TOTAL SCORE: 6
4. TROUBLE RELAXING: SEVERAL DAYS
GAD7 TOTAL SCORE: 6
6. BECOMING EASILY ANNOYED OR IRRITABLE: SEVERAL DAYS
2. NOT BEING ABLE TO STOP OR CONTROL WORRYING: SEVERAL DAYS
IF YOU CHECKED OFF ANY PROBLEMS ON THIS QUESTIONNAIRE, HOW DIFFICULT HAVE THESE PROBLEMS MADE IT FOR YOU TO DO YOUR WORK, TAKE CARE OF THINGS AT HOME, OR GET ALONG WITH OTHER PEOPLE: SOMEWHAT DIFFICULT

## 2023-07-07 ASSESSMENT — COLUMBIA-SUICIDE SEVERITY RATING SCALE - C-SSRS
1. SINCE LAST CONTACT, HAVE YOU WISHED YOU WERE DEAD OR WISHED YOU COULD GO TO SLEEP AND NOT WAKE UP?: PASSIVE THOUGHTS
1. SINCE LAST CONTACT, HAVE YOU WISHED YOU WERE DEAD OR WISHED YOU COULD GO TO SLEEP AND NOT WAKE UP?: YES
SUICIDE, SINCE LAST CONTACT: NO
ATTEMPT SINCE LAST CONTACT: NO
2. HAVE YOU ACTUALLY HAD ANY THOUGHTS OF KILLING YOURSELF?: NO
6. HAVE YOU EVER DONE ANYTHING, STARTED TO DO ANYTHING, OR PREPARED TO DO ANYTHING TO END YOUR LIFE?: NO
TOTAL  NUMBER OF ABORTED OR SELF INTERRUPTED ATTEMPTS SINCE LAST CONTACT: NO
TOTAL  NUMBER OF INTERRUPTED ATTEMPTS SINCE LAST CONTACT: NO

## 2023-07-07 ASSESSMENT — ACTIVITIES OF DAILY LIVING (ADL)
ADLS_ACUITY_SCORE: 35

## 2023-07-07 ASSESSMENT — PATIENT HEALTH QUESTIONNAIRE - PHQ9: SUM OF ALL RESPONSES TO PHQ QUESTIONS 1-9: 8

## 2023-07-07 NOTE — PROGRESS NOTES
[facility] Assessment      Vital signs:  See relevant inpatient flow-sheet from 7/7/23.    LMP 06/21/2023       Transfer from ED    Counselor: Group E  Drug of Choice: ETOH  Last use: 7/5/23  [facility]  Psychiatrist/therapist: BRISA    Medical history/current conditions:  NA    H&P Screen:  H&P within the last 90 days: Yes.  Date: 7/6/23 Location: Emergency [facility] diagnosis: NA  Medication compliant?: yes  Recent sucidal thoughts? no     When? NA  Current thought of self-harm? no    Plan? NA    Pain assessment:   Pt. Experiencing pain at this time?  No      LP Falls assessment    Has patient had a fall(s) in the last 6 months?  No  If yes, what were the circumstances surrounding the fall(s)? NA  Does patient have gait dysfunctions? (limping, dragging of toes, shuffling feet, unsteadiness, difficulty standing /walking)  No  Does patient appear to have deconditioning/muscle loss/malnutrition/fatigue? (due to inactivity, bedrest (long hospitalization), medical condition(s) No  Does patient experience confusion, dizziness or vertigo? No  Is patient visually impaired? No   Does patient have any adaptive equipment (hearing aids, wheelchair, walker, prosthesis, crutches, cane, etc..) No  Is patient taking 2 or more of the following medications?  anticonvulsants, anti-hypertensives, diuretics, laxatives, sedatives, and psychotropics (single-select) No  Is patient taking medication (s) that would cause urinary or bowel urgency (ex: lactulose/Furosemide)? No  Does patient have a medical condition (ex: Diabetes, Liver Disease, Respiratory diseases, chronic pain, Heart Disease, Neuropathy, etc...) that could affect balance/gait? No    If yes to any of the above educate patient on fall prevention while at [facility].            Floors clutter/obstacle free            Proper footwear/[place] shoes           Adjust walking speed accordingly            Recommend caution going on longer walks. Try shorter walks and see how you do first.  Use elevators when appropriate.           Notify staff if you are experiencing fatigue, weakness, drowsiness/ dizziness or have had a fall.            Use adaptive equipment as recommended           Wheelchairs must be managed and propelled independently without staff assistance            Expectation of complete independence with all cares and compliance.  Report to staff if having difficulty     LP Community Medical Screen for COVID-19  ______________________________________________________________________________________________________________________  Do you have any of the following NEW or worsening symptoms NOT attributed to pre-existing conditions?    No    o Fever of 100.0  F (37.8 C) or over  o Chills  o Cough  o Shortness of Breath  o Loss of taste or smell  o Generalized body aches  o Persistent headache  o Sore throat (or trouble eating or drinking in young children?)  o Nausea, Vomiting, or diarrhea (loose stools)    Did you test positive for COVID-19 in the last 10 days or are you waiting on the test results due to an exposure or symptoms?  No    Has anyone told you to self-quarantine due to exposure to someone with COVID-19?  No    If pt responds   YES to any of the symptoms or  Positive COVID-19 result in the last 10 days with or without symptoms or YES to symptoms with pending results ptwill need to leave unit immediately and can return in 11 days from discharge date.    ______________________________________________________________________________________________________________________  If you are admitting directly from the community you will be required to stay in your room until COVID lab results confirm negative. If COVID results are positive, You will have to exit the LP program, quarantine as recommended per CDC and then may return for CD treatment after symptoms have resolved.  What is your exit plan  should you be positive for COVID today or anytime during your stay? Return home    COVID-19 Test completed by LPRN ? No    COVID-19 - Pt informed of the following while at LP:    1) Practice good hand washing hygiene and avoid touching face    2) If pt has any of the symptoms below, notify staff immediately.      Fever     Cough     Shortness of breath or difficulty breathing     Chills     Repeated shaking with chills    Muscle pain     3) COVID-19 testing may be initiated more than once during your stay.  If COVID results are at anytime positive, the pt will follow exit plan as listed above,  quarantine as recommended per CDC and then may return for CD treatment after symptoms have resolved.     RN Assessment of Patient's Ability to Safely Manage and Self-Administer Respiratory Treatments    Has experience in the management of Respiratory (If NA, indicate and move to Integrative Therapies): NA    Integrative Therapies: Essential Oils    Patient requesting essential oil inhaler to manage (Mood/Mental Health/Physical/Spiritual symptoms).     Discussed appropriate use of essential oil inhalers and instructed patient not to leave labeled product out on unit.     Patient was screened for kidney disease, asthma/reactive airway disease and rashes and wounds or 1st trimester of pregnancy    List Essential Oils requested by pt deyvi andres    Patient verbalized and demonstrated understanding of how to use essential oil inhaler correctly and will notify LP RN with any concerns or side effects. Patient agrees not to share their essential oil inhaler with other clients.  Continue to support the patient in safely utilizing integrative therapies as able to manage symptoms during treatment.     Patient tobacco use:    Do you use tobacco? yes   Type? cigarettes  How often? often  How much? much   Are you interested in quitting? no    NRT (Nicotine Replacement therapy) ordered? no   Pt is aware of the dangers of tobacco  cessation and in contemplation.    Pt given written education.    Nutritional Assessment:    Have you ever purged, binged or restricted yourself as a way to control your weight?   No     Are you on a special diet?   No     Do you have any concerns regarding your nutritional status?   No     Have you had any appetite changes in the last 3 months?   No   Have you had weight loss or weight gain of more than 10 lbs in the last 3 months?   If patient gained or lost more than 10 lbs, then refer to program RN / attending Physician for assessment.   No   Was the patient informed of BMI?    Normal, No Intervention   Yes   Have you engaged in any risk-taking behavior that would put you at risk for exposure to blood-borne or sexually transmitted diseases?   No   Do you have any dental problems?   No       Review with pt's for opioid use disorder: (Please delete below if not applicable)    LPRN to review with pt:     Pt is expected to attend all required LP programming without staff prompting     Compliancy with all prescribed medication self-administration is required     Pt understands LP drug toxicology screening process    Pt given written copy of detox options and/or access to [facility]    LPRN reviewed with pt the following information:  Yes    Pt informed if leaving AMA, they will be directed to take medications with them.  Should pt's choose to leave medications at LP, ALL medications will be packaged and delivered to inpatient pharmacy for temporary storage.  Inpt pharmacy will follow protocol to reach out to pt.  If pharmacy unable to reach pt and/or pt does not retrieve medications, they will be destroyed per inpt pharmacy protocol.   In regards to 'medical concerns/medication refill expectations' while at LP:    LP has no rounding provider to assess medical issues or to refill your medications    Please make virtual/phone appt/s with your community provider/s and notify LPRN of date and time    You may not  leave LP to attend any medical appt's.     You are responsible for having a plan to refill medications if necessary.  Please allow time to complete this.    Pt verbalizes understanding of the above criteria yes    [facility]  Nurse Liaison / CD [facility]  O: [phone]  Fax:[phone]  [facility] [id number]  M-F: 7AM to 5:30PM   Sat-Sun: 7AM to 3PM  After hours: [phone]  Nursing Assessment Summary:  See above    On-going nursing intervention required?   No  Acute care visit recommended: no     PCP:
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
## 2023-07-07 NOTE — ED PROVIDER NOTES
[facility] ED Mental Health Handoff Note:       Brief HPI:  Patient was signed out to me by previous physician see initial ED Provider note for full details of the presentation.   Home meds reviewed and ordered/administered: Yes    Medically stable for inpatient mental health admission: Yes.    Evaluated by mental health: Yes.  Plan is to go to the detox unit  Safety concerns: At the time I received sign out, there were no safety concerns.    Emergency department course:  Patient was signed out to me by previous physician.  Awaiting transfer to detox.     Gerber Arcos,   07/07/23 0757

## 2023-07-07 NOTE — PROGRESS NOTES
"Triage & Transition Services, Extended Care     Therapy Progress Note    Patient: Alistair goes by \"Alistair,\" uses she/her pronouns  Date of Service: July 7, 2023  Site of Service: [facility] ED  Patient was seen in-person.     Presenting problem:   Alistair is followed related to observation pending admit to [facility]. Please see initial DEC/LMHP Crisis Assessment completed by Adilia BALDERRAMA on 7/05/2023, and Reassessment by Poli BALDERRAMA on 7/06/2023, for complete assessment information. Notable concerns include Alcohol Abuse.     Individuals Present: Alistair & Chris CLINTON Lee    Session start: 0822  Session end: 0838  Session duration in minutes: 16  Session number: 2  Anticipated number of sessions or this episode of care: 2  CPT utilized: 26878 - Psychotherapy (with patient) - 30 (16-37*) min    Current Presentation:   Writer introduced self and role with extended care, and reviewed the disposition plan for transition to [facility]. She was aware of the plan and expressed hopefulness for the program. She discussed that she had never done therapy before and that this would be a new experience. Provided reassurance to practice vulnerability and encouragement to consider treatment goals towards her life worth living. Discussed treatment goals and she identified that she wants to first work on her sobriety, next she wants to reconcile with her , and she wants to spend more quality time with her children. She did express feeling proud of herself for seeking help. She denies SI and commits to her safety for the transition to [facility]. She had no further questions.      Mental Status Exam:   Appearance: awake, alert  Attitude: cooperative  Eye Contact: good  Mood: anxious  Affect: appropriate and in normal range  Speech: clear, coherent  Psychomotor Behavior: no evidence of tardive dyskinesia, dystonia, or tics  Thought Process:  logical  Associations: no loose associations  Thought " Content: no evidence of suicidal ideation or homicidal ideation  Insight: fair  Judgement: fair  Oriented to: time, person, and place  Attention Span and Concentration: intact  Recent and Remote Memory: intact    Diagnosis:   300.00 (F41.9) Unspecified Anxiety Disorder   311 (F32.9) Unspecified Depressive Disorder      Substance-Related & Addictive Disorders Alcohol Use Disorder   303.90 (F10.20) Moderate by history       Therapeutic Intervention(s):   Provided active listening, unconditional positive regard, and validation. Engaged in guided discovery, explored patient's perspectives and helped expand them through socratic dialogue. Reviewed healthy living that supports positive mental health, including looking at sleep hygiene, regular movement, nutrition, and regular socialization. Provided positive reinforcement for progress towards goals, gains in knowledge, and application of skills previously taught.     Treatment Objective(s) Addressed:   The focus of this session was on rapport building, orienting the patient to therapy, identifying treatment goals and building self-esteem.     Progress Towards Goals:   Patient discussed her goals with treatment and is hopeful for treatment.      General Recommendations:   Continue to monitor for harm. Consider: Use a positive, direct and calm approach. Pt's tend to match the energy/mood of the staff. Keep focus positive and upbeat, Allow family calls/visits, Listen in a neutral, non-judgmental way. Offer reassurance and Be mindful of your nonverbal cues (body language, facial expressions)    Plan:   Discharge to [facility]: Pt was assessed in the ED by DEC, Extended Care, Psych Consult, and CD Consult with the final disposition for CD treatment on [facility]. She is determined safe for discharge from the ED to transfer directly to [facility] and is recommended to follow all treatment recommendations from [facility] treatment team.       Plan for Care reviewed with  Assigned Medical Provider? Yes. Provider, Gerber Arcos, , response: Supports Disposition     Chris Vitale, [facility]   Licensed Mental Health Professional (LMHP), [facility]  [phone]

## 2023-07-07 NOTE — TELEPHONE ENCOUNTER
R: 9:39pm- ED MD called to notify PPS that Pt is needing detox tonight with plans to admit to [facility] tomorrow.  Her LFTs are also now below 300.      Pt's AST is 260 and ALT is 202.      10:08pm- Called [facility] to place Pt on the board to present for admission to [facility].      10:21pm- On call provider, Dr. Greer from [facility] accepts for [facility].  CD Admit.

## 2023-07-07 NOTE — PROGRESS NOTES
Progress Note    This patient had a Full Comprehensive Substance Use Disorder assessment on 7/6/2023 completed by Uli Davalos LADC.  This patient was seen for a face to face update of the Full Comprehensive Substance Use Disorder assessment on 7/7/2023 by Petra Sellers.  INSIDE: The patient's Full Comprehensive Substance Use Disorder assessment completed on 7/6/2023 is in the patient's electronic medical record in Epic in the Chart Review section under the Notes/Trans Tab.    Alcohol/Drug use since the last CD evaluation (include date of last use):     Last day of alcohol use on 7/5/2023     Please note any other clinical changes since the last CD evaluation (such as medication changes, additional legal charges, detoxification admissions, overdoses, etc.)     No significant changes since the last CD evaluation       ASAM Dimensions Original scores Current Scores   I.) Intoxication and Withdrawal: 1 0   II.) Biomedical:  0 0   III.) Emotional and Behavioral:  2 2   IV.) Readiness to Change:  3 3   V.) Relapse Potential: 3 3   VI.) Recovery Environmental: 4 4     Please list clinical justifications for the above ASAM score changes since the original comprehensive assessment:     The patient's score on Dimension I changed from a 1 to a 0.  The patient had not consumed any alcohol or drugs since 7/5/2023 and she does not appear to have any current risk of having significant withdrawal symptoms.        Current SONIA: Current UA:     No SONIA as the patient was a direct transfer from [facility] Emergency Department at [facility] in [place], [place].     No UA screen as the patient was a direct transfer from [facility] Emergency Department at [facility] in [place], [place].        PHQ-9, JESSICA-7   PHQ-9 on 7/7/2023 JESSICA-7 on 7/7/2023   The patient's PHQ-9 score was 8 out of 27, indicating mild depression.   The patient's JESSICA-7 score was 6 out of 21, indicating mild anxiety.       [place]-Suicide Severity Rating  Scale Reassessment   Have you ever wished you were dead or that you could go to sleep and not wake up?  Past Month:  Yes     Have you actually had any thoughts of killing yourself?  Past Month:  Yes     Have you been thinking about how you might do this?     Past Month:  No   Lifetime:  No   Have you had these thoughts and had some intention of acting on them?     Past Month:  No   Lifetime:  No   Have you started to work out the details of how to kill yourself?   Past Month:  No   Lifetime:  No   Do you intend to carry out this plan?   No     When you have the thoughts how long do they last?  Fleeting - few seconds or minutes     Are there things - anyone or anything (i.e. family, Sikhism, pain of death) that stopped you from wanting to die or acting on thoughts of suicide?  Protective factors definitely stopped you from attempting suicide       2008  [employer]  Used with permission by Clarissa Leo, PhD.       Guide to C-SSRS Risk Ratings   NO IDEATION:  with no active thoughts IDEATION: with a wish to die. IDEATION: with active thoughts. Risk Ratings   If Yes No No 0 - Very Low Risk   If NA Yes No 1 - Low Risk   If NA Yes Yes 2 - Low/moderate risk   IDEATION: associated thoughts of methods without intent or plan INTENT: Intent to follow through on suicide PLAN: Plan to follow through on suicide Risk Ratings cont...   If Yes No No 3 - Moderate Risk   If Yes Yes No 4 - High Risk   If Yes Yes Yes 5 - High Risk   The patient's ADDITIONAL RISK FACTORS and lack of PROTECTIVE FACTORS may increase their overall suicide risk ratings.     Additional Risk Factors:    Significant history of having untreated or poorly treated mental health symptoms     Tendency to be socially isolated and/or cut off from the support of others     A recent loss that was significant to the patient, i.e. loss of job, loss of home, divorce, break-up, etc.     Significant history of trauma and/or abuse issues  "  Protective Factors:    Having people in his/her life that would prevent the patient from considering a suicide attempt (i.e. young children, spouse, parents, etc.)     An absence of chronic health problems or stable and well treated chronic health issues     Having easy access to supportive family members     Risk Status   1. - Low Risk: Evaluation Counselors:  Document in Epic / SBAR to counselor \"Low Risk\".      Treatment Counselors:  Reassess upon admission as applicable, assess weekly in progress notes under Dimension 3 and summarize in Discharge / Treatment summary under Dimension 3.     Additional information to support suicide risk rating: There was no additional information to provide at this time.     "

## 2023-07-07 NOTE — PROGRESS NOTES
Behavioral Team Discussion: (7/7/2023)    Continued Stay Criteria/Rationale: Patient admitted for Alcohol Use Disorder.  Plan: The following services will be provided to the patient; psychiatric assessment, medication management, therapeutic milieu, individual and group support, and skills groups.   Participants: ED Provider: Dr. Simon Mai MD; ED RN: Jory Brito RN; ED CM's: Jay GALARZA [facility] .  Summary/Recommendation: Providers will assess today for treatment recommendations, discharge planning, and aftercare plans. Pt will be transferred to [facility] with MD's approval   Medical/Physical: Pt denies any at this time  Precautions: N/A  Behavioral Orders   Procedures     1:1 Precautions     Discontinue 1:1 attendant for suicide risk     Order Specific Question:   I have performed an in person assessment of the patient     Answer:   Based on this assessment the patient no longer requires a one on one attendant at this point in time.     Order Specific Question:   Rationale     Answer:   Patient States able to remain safe in hospital     Rationale for change in precautions or plan: N/A  Progress: No Change.    ASAM Dimension Scale Ratings:  Dimension 1: 1 Client can tolerate and cope with withdrawal discomfort. The client displays mild to moderate intoxication or signs and symptoms interfering with daily functioning but does not immediately endanger self or others. Client poses minimal risk of severe withdrawal.  Dimension 2: 2 Client has difficulty tolerating and coping with physical problems or has other biomedical problems that interfere with recovery and treatment. Client neglects or does not seek care for serious biomedical problems.  Dimension 3: 2 Client is  impaired in significant life areas and has severe symptoms of emotional, behavioral, or cognitive problems that will not interfere with the client ability to participate in treatment activities.  Dimension 4: 3 Client displays inconsistent  compliance, minimal awareness of either the client's addiction or mental disorder, and is minimally cooperative.  Dimension 5: 4 No awareness of the negative impact of mental health problems or substance abuse. No coping skills to arrest mental health or addiction illnesses, or prevent relapse.  Dimension 6: 4 Client has (A) Chronically antagonistic significant other, living environment, family, peer group or long-term criminal justice involvement that is harmful to recovery or treatment progress, or (B) Client has an actively antagonistic significant other, family, work, or living environment with immediate threat to the client's safety and well-being.

## 2023-07-07 NOTE — ED PROVIDER NOTES
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 [phone]  E-mail Address: [email or web address] Mental Health and Addiction Services Evaluation   Department  [address] [place] [address]     *Due to regulation of Title 42 of the Code of Federal Regulations   (CFR) Part 2: Confidentiality laws apply to this note and the   information wherein.  Thus, this note cannot be copy and pasted   into any other health care staff's note nor can it be included in   general medical records sent to ANY outside agency without the   patient's written consent.       Psychiatry IP Consult: AT 72 hour hold review; Consultant may enter orders: Yes; Requesting provider? Hospitalist (if different from attending physician)     Status: None ()    Collection Time: 07/06/23 11:26 AM    Corie Swan     7/6/2023 11:54 AM  Mental Health Transition and Triage-Extended Care  Civil Commitment Status Plan of Care      Writer consulted with Karla Ellington, Psych NP, regarding this   patient and they are in agreement that at this time Alistair Huggins does  not meet criteria for civil commitment.     Recommendations/Plan:      Extended Care [facility] to continue to follow for support for   disposition.    Request review of commitment needs if matters change regarding   this case by calling Extended Care at [phone].    ANA DUNCAN, LICSW, Psychotherapist  Extended Care- Triage & Transition Services  Callback: [phone]       Comprehensive metabolic panel     Status: Abnormal    Collection Time: 07/06/23  4:43 PM   Result Value Ref Range    Sodium 136 136 - 145 mmol/L    Potassium 4.4 3.4 - 5.3 mmol/L    Chloride 97 (L) 98 - 107 mmol/L    Carbon Dioxide (CO2) 30 (H) 22 - 29 mmol/L    Anion Gap 9 7 - 15 mmol/L    Urea Nitrogen 17.1 6.0 - 20.0 mg/dL    Creatinine 0.75 0.51 - 0.95 mg/dL    Calcium 9.5 8.6 - 10.0 mg/dL    Glucose 93 70 - 99 mg/dL    Alkaline Phosphatase 37 35 - 104 U/L     (H) 0 - 45 U/L     (H) 0 - 50 U/L    Protein Total 6.9 6.4 - 8.3 g/dL    Albumin 4.6 3.5 - 5.2 g/dL    Bilirubin Total 0.7 <=1.2 mg/dL    GFR Estimate >90 >60 mL/min/1.73m2   UA with Microscopic reflex to Culture     Status: Abnormal    Collection Time: 07/06/23  4:46 PM    Specimen: Urine, Clean Catch   Result Value Ref Range    Color Urine Yellow Colorless, Straw, Light Yellow, Yellow    Appearance Urine Slightly Cloudy (A) Clear    Glucose Urine Negative Negative mg/dL    Bilirubin Urine Negative Negative    Ketones Urine Negative Negative mg/dL    Specific Gravity Urine 1.024 1.003 - 1.035    Blood Urine Negative Negative    pH Urine 6.5 5.0 - 7.0    Protein Albumin Urine 20 (A) Negative mg/dL    Urobilinogen Urine 2.0 Normal, 2.0 mg/dL    Nitrite Urine Negative Negative    Leukocyte Esterase Urine Negative Negative    Mucus Urine Present (A) None Seen /LPF    RBC Urine 1 <=2 /HPF    WBC Urine 1 <=5 /HPF    Squamous Epithelials Urine 10 (H) <=1 /HPF    Narrative    Urine Culture not indicated   HCG qualitative urine (UPT)     Status: Normal    Collection  Time: 07/06/23  4:46 PM   Result Value Ref Range    hCG Urine Qualitative Negative Negative   Urine Drugs of Abuse Screen     Status: Abnormal    Collection Time: 07/06/23  4:46 PM    Narrative    The following orders were created for panel order Urine Drugs of Abuse Screen.  Procedure                               Abnormality         Status                     ---------                               -----------         ------                     Drug abuse screen 1 urin...[[id number]]  Abnormal            Final result                 Please view results for these tests on the individual orders.   Drug abuse screen 1 urine (ED)     Status: Abnormal    Collection Time: 07/06/23  4:46 PM   Result Value Ref Range    Amphetamines Urine Screen Negative Screen Negative    Barbituates Urine Screen Negative Screen Negative    Benzodiazepine Urine Screen Positive (A) Screen Negative    Cannabinoids Urine Screen Negative Screen Negative    Cocaine Urine Screen Negative Screen Negative    Opiates Urine Screen Negative Screen Negative   Extra Tube     Status: None ()    Collection Time: 07/06/23  4:56 PM    Narrative    The following orders were created for panel order Extra Tube.  Procedure                               Abnormality         Status                     ---------                               -----------         ------                     Extra Purple Top Tube[[id number]]                                                         Please view results for these tests on the individual orders.       ED MEDICATIONS:   Medications   diazepam (VALIUM) tablet 5-20 mg (has no administration in time range)   atenolol (TENORMIN) tablet 50 mg (has no administration in time range)   thiamine (B-1) tablet 100 mg (100 mg Oral $Given 7/6/23 0752)   folic acid (FOLVITE) tablet 1 mg (1 mg Oral $Given 7/6/23 0752)   multivitamin w/minerals (THERA-VIT-M) tablet 1 tablet (1 tablet Oral $Given 7/6/23 0752)   hydrOXYzine (ATARAX) tablet 25  mg (25 mg Oral $Given 7/6/23 1025)     Or   hydrOXYzine (ATARAX) tablet 50 mg ( Oral See Alternative 7/6/23 1025)   nicotine polacrilex (NICORETTE) gum 4 mg (4 mg Buccal $Given 7/5/23 2212)   thiamine (B-1) tablet 100 mg (100 mg Oral $Given 7/5/23 2235)   multivitamin w/minerals (THERA-VIT-M) tablet 1 tablet (1 tablet Oral $Given 7/5/23 2235)   nicotine polacrilex (NICORETTE) gum 4 mg (4 mg Buccal $Given 7/6/23 1023)         Impression:    ICD-10-CM    1. Acute alcoholic intoxication in alcoholism with complication (H)  F10.229       2. Depression with suicidal ideation  F32.A     R45.851           Plan:    Pending studies include repeat CMP.    Repeat CMP with LFTs both below 300 at this time.  Called back mental health intake who is aware of patient's admission and had already been given handoff by the previous provider.  She was excepted for treatment overnight in the detox unit until her bed is available and [facility].      MD Bishop Arevalo Jill C, MD  07/06/23 2398

## 2023-07-07 NOTE — PROGRESS NOTES
Initial Services Plan    Before your first treatment group, please do the following    Immediate health & safety concerns: Look for sober housing and a supportive social network.    Suggestions for client during the time between intake & completion of treatment plan:  Tour your treatment center (unit or outpatient clinic).    Client issues to be addressed in the first treatment sessions:  Identify motivations(s) for coming to treatment, i.e. legal, family, job, self    Petra Sellers  7/7/2023

## 2023-07-07 NOTE — PROGRESS NOTES
Name: Alistair Huggins  Date: 7/7/2023  Medical Record: [id number]    Envelope Number: [id number]  List of Contents (List each item separately in new row):   1 cell phone    Admission:  I am responsible for any personal items that are not sent to the safe or pharmacy.  [facility] is not responsible for loss, theft or damage of any property in my possession.    Patient Signature:  ___________________________________________       Date/Time:__________________________    Staff Signature: __________________________________       Date/Time:__________________________    [facility] Staff person, if patient is unable/unwilling to sign:      __________________________________________________________       Date/Time: __________________________    **All medications are packaged by [facility] staff and securely stored [facility]. Medications left by patients at discharge will be packaged by [facility] staff and transported by [facility] staff to inpatient pharmacy for storage.**  Discharge:  [facility] has returned all of my personal belongings:    Patient Signature: ________________________________________     Date/Time: ____________________________________    Staff Signature: ______________________________________     Date/Time:_____________________________________

## 2023-07-07 NOTE — PROGRESS NOTES
Comprehensive Summary Update and Review  Counselor met with patient on 7/7/2023 and reviewed the Comprehensive Assessment. Safety plan complete sent for scanning to pt's EHR. The following updates have been made:    Patient s comprehensive assessment update dated 7/6/2023 diagnosed patient with Cannabis Use Disorder, Severe, (F12.20), but assessment also noted no patient history of cannabis use. Patient confirmed no history of cannabis use and does not meet criteria for Cannabis Use Disorder.     Risk rating in dimension 2 increased from a  0  to a  1  due to patient s lack of primary care provider, and lack of routine physical health maintenance.    Risk rating in dimension 5 increased from a  3  to a  4  due to patient s historical and recent use patterns, longest period of abstinence was two weeks several years ago, and no history of substance use treatment or services to develop coping strategies to address alcohol use; patient is at a high risk for return to use.
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
## 2023-07-08 ENCOUNTER — HOSPITAL ENCOUNTER (OUTPATIENT)
Dept: BEHAVIORAL HEALTH | Facility: CLINIC | Age: 43
Discharge: HOME | End: 2023-07-08
Attending: FAMILY MEDICINE
Payer: COMMERCIAL

## 2023-07-08 PROCEDURE — 1002N00001 HC LODGING PLUS FACILITY CHARGE ADULT

## 2023-07-08 PROCEDURE — H2035 A/D TX PROGRAM, PER HOUR: HCPCS | Mod: HQ

## 2023-07-08 NOTE — GROUP NOTE
"Psychoeducation Group Documentation    PATIENT'S NAME: Alistair Huggins  MRN:   [id number]  :   1980  ACCT. NUMBER: [id number]  DATE OF SERVICE: 23  START TIME: 12:30 PM  END TIME:  2:30 PM  FACILITATOR(S): Ad Maers LADC; Jv Artis LADC  TOPIC: BEH Pyschoeducation  Number of patients attending the group:  5  Group Length:  2 Hours    Skills Group Therapy Type: Relationship skills development    Summary of Group / Topics Discussed:    Relationship/social skills and Communication Skills.           Group Attendance:  Attended group session    Patient's response to the group topic/interactions:  cooperative with task    Patient appeared to be Attentive.         Client specific details:  Alistair, attended Lecture on  Relationships  and Communication Skills\". This discussion was consistent with building coping and stratergies to help build a foundation with healthy boundaries and recovery  goals setting. Patient engaged in Q&A after the lecture was presented.             "

## 2023-07-08 NOTE — GROUP NOTE
Group Therapy Documentation    PATIENT'S NAME: Alistair Huggins  MRN:   [id number]  :   1980  ACCT. NUMBER: [id number]  DATE OF SERVICE: 23  START TIME:  9:00 AM  END TIME: 11:00 AM  FACILITATOR(S): Boni Randolph LADC  TOPIC: BEH Group Therapy  Number of patients attending the group:  6  Group Length:  2 Hours    Group Therapy Type: Recovery strategies    Summary of Group / Topics Discussed:    Recovery Principles, Relationship/socialization, and Disease of addiction      Group Attendance:  Attended group session    Patient's response to the group topic/interactions:  cooperative with task    Patient appeared to be Attentive and Engaged.        Client specific details:  The patient participated in the morning lecture on Family Roles, Relationships and Boundaries.

## 2023-07-09 ENCOUNTER — HOSPITAL ENCOUNTER (OUTPATIENT)
Dept: BEHAVIORAL HEALTH | Facility: CLINIC | Age: 43
Discharge: HOME | End: 2023-07-09
Attending: FAMILY MEDICINE
Payer: COMMERCIAL

## 2023-07-09 PROCEDURE — H2035 A/D TX PROGRAM, PER HOUR: HCPCS | Mod: HQ

## 2023-07-09 PROCEDURE — 1002N00001 HC LODGING PLUS FACILITY CHARGE ADULT

## 2023-07-09 NOTE — GROUP NOTE
Group Therapy Documentation    PATIENT'S NAME: Alistair Huggins  MRN:   [id number]  :   1980  ACCT. NUMBER: [id number]  DATE OF SERVICE: 23  START TIME:  9:00 AM  END TIME: 11:00 AM  FACILITATOR(S): Ad Mares LADC  TOPIC: BEH Group Therapy  Number of patients attending the group:  5  Group Length:  2 Hours    Group Therapy Type: Health and wellbeing     Summary of Group / Topics Discussed:    Self-care activities      Group Attendance:  Attended group session    Patient's response to the group topic/interactions:  cooperative with task    Patient appeared to be Actively participating.        Client specific details:  Alistair participated and interacted appropriately with peers and staff in AM lecture presented by RN staff. No triggers to use noted or discussed.

## 2023-07-09 NOTE — GROUP NOTE
Group Therapy Documentation    PATIENT'S NAME: Alistair Huggins  MRN:   [id number]  :   1980  ACCT. NUMBER: [id number]  DATE OF SERVICE: 23  START TIME: 12:30 AM  END TIME:  1:30 PM  FACILITATOR(S): Ad Mares LADC  TOPIC: BEH Group Therapy  Number of patients attending the group:  19  Group Length:  1 Hours    Group Therapy Type: Recovery strategies    Summary of Group / Topics Discussed:    Recovery Principles      Group Attendance:  Attended group session    Patient's response to the group topic/interactions:  cooperative with task    Patient appeared to be Actively participating.        Client specific details:  Alistair participated and interacted appropriately with peers and staff in PM lecture. No triggers to use noted or discussed.

## 2023-07-10 ENCOUNTER — HOSPITAL ENCOUNTER (OUTPATIENT)
Dept: BEHAVIORAL HEALTH | Facility: CLINIC | Age: 43
Discharge: HOME | End: 2023-07-10
Attending: FAMILY MEDICINE
Payer: COMMERCIAL

## 2023-07-10 DIAGNOSIS — F17.200 NICOTINE DEPENDENCE: Primary | ICD-10-CM

## 2023-07-10 DIAGNOSIS — F19.20 CHEMICAL DEPENDENCY (H): ICD-10-CM

## 2023-07-10 PROCEDURE — H2035 A/D TX PROGRAM, PER HOUR: HCPCS | Mod: HQ

## 2023-07-10 PROCEDURE — 1002N00001 HC LODGING PLUS FACILITY CHARGE ADULT

## 2023-07-10 NOTE — GROUP NOTE
Group Therapy Documentation    PATIENT'S NAME: Alistair Huggins  MRN:   [id number]  :   1980  ACCT. NUMBER: [id number]  DATE OF SERVICE: 7/10/23  START TIME: 12:30 PM  END TIME:  2:30 PM  FACILITATOR(S): Sabine Funes LADC  TOPIC: BEH Group Therapy  Number of patients attending the group:  5  Group Length:  2 Hours    Group Therapy Type: Recovery strategies    Summary of Group / Topics Discussed:    Recovery Principles, Spiritual Care, and Self-care activities    Spiritual Group Therapy consisted of Spiritual Care and addressing Principles that are important in recovery, and wellness of self. Patients and facilitators (Valerie & GISSEL)  reviewed topics related to sense of self, identity, and relations to others within spirituality/relision/nonspiritual concepts. Patients gained insight in how gratitude can benefit their recovery goals and discussed ways of practicing gratitude as a daily self-care activity. Each patient had an opportunity to process and provide constructive feedback to peers who shared.      Group Attendance:  Attended group session    Patient's response to the group topic/interactions:  cooperative with task    Patient appeared to be Actively participating, Attentive and Engaged.        Client specific details:  Patient fully engaged in group activity and discussion, providing appropriate feedback and sharing personal insight on gratitude and spirituality.

## 2023-07-10 NOTE — ADDENDUM NOTE
Encounter addended by: Sabine Funes LADC on: 7/10/2023 1:36 PM   Actions taken: Clinical Note Signed

## 2023-07-10 NOTE — GROUP NOTE
"Group Therapy Documentation    PATIENT'S NAME: Alistair Huggins  MRN:   [id number]  :   1980  ACCT. NUMBER: [id number]  DATE OF SERVICE: 7/10/23  START TIME:  9:00 AM  END TIME: 11:00 AM  FACILITATOR(S): Sabine Funes LADC  TOPIC: BEH Group Therapy  Number of patients attending the group:  5  Group Length:  2 Hours    Group Therapy Type: Recovery strategies    Summary of Group / Topics Discussed:    Recovery Principles, Sober coping skills, and Disease of addiction    Patients checked in to primary group by describing current mood, and sharing an affirmation and gratitude. Each patient identified a goal they have for today and described how they will ask for help if needed. Several new patients introduced themselves. Facilitator led group discussion on obstacles and challenges in early recovery. Patients identified and shared a challenge to their recovery goals and discussed ways to address these challenges. Each patient had an opportunity to process and provide constructive feedback to peers who shared.      Group Attendance:  Attended group session    Patient's response to the group topic/interactions:  cooperative with task    Patient appeared to be Actively participating, Attentive and Engaged.        Client specific details:  Patient fully participated in group check-in and engaged throughout discussion, providing appropriate feedback, and sharing personal insights on the topic of challenges in early recovery. 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

## 2023-07-11 ENCOUNTER — HOSPITAL ENCOUNTER (OUTPATIENT)
Dept: BEHAVIORAL HEALTH | Facility: CLINIC | Age: 43
Discharge: HOME | End: 2023-07-11
Attending: FAMILY MEDICINE
Payer: COMMERCIAL

## 2023-07-11 PROCEDURE — H2035 A/D TX PROGRAM, PER HOUR: HCPCS

## 2023-07-11 PROCEDURE — 1002N00001 HC LODGING PLUS FACILITY CHARGE ADULT

## 2023-07-11 PROCEDURE — H2035 A/D TX PROGRAM, PER HOUR: HCPCS | Mod: HQ

## 2023-07-11 NOTE — GROUP NOTE
Group Therapy Documentation    PATIENT'S NAME: Alistair Huggins  MRN:   [id number]  :   1980  ACCT. NUMBER: [id number]  DATE OF SERVICE: 23  START TIME:  9:00 AM  END TIME: 11:00 AM  FACILITATOR(S): Janice Hanks LADC  TOPIC: BEH Group Therapy  Number of patients attending the group:  5    Group Length:  2 Hours    Group Therapy Type: Recovery strategies, Emotion processing, and Daily living/independence skills    Summary of Group / Topics Discussed:    Recovery Principles, Sober coping skills, Relationship/socialization, and Relapse prevention      Group Attendance:  Attended group session    Patient's response to the group topic/interactions:  cooperative with task    Patient appeared to be Actively participating, Attentive and Engaged.        Client specific details:  Patient wrote a break up letter with her drug of choice and the things that make her depressed vs the things she is grateful for. Patient was attentive and participative..

## 2023-07-11 NOTE — GROUP NOTE
Group Therapy Documentation    PATIENT'S NAME: Alistair Huggins  MRN:   [id number]  :   1980  ACCT. NUMBER: [id number]  DATE OF SERVICE: 23  START TIME: 12:30 PM  END TIME:  2:30 PM  FACILITATOR(S): Sabine Funes LADC  TOPIC: BEH Group Therapy  Number of patients attending the group:  4  Group Length:  2 Hours    Group Therapy Type: Recovery strategies    Summary of Group / Topics Discussed:    Recovery Principles, Sober coping skills, and Relapse prevention    Facilitator led group discussion on self-exploration in treatment, focusing on strength development. Patients participated in a strength-spotting exercise where they each identified and shared a positive success story. As each patient shared their accomplishment, the other patients listened and noted strengths they spotted in their peer's story. Patients then gave each other strength feedback, sharing what strengths they heard their peer use. After each patient shared their accomplishment story and received feedback, facilitator led discussion on how patients can use the strengths identified in their story to help them achieve their recovery goals and avert return to use of substances. Each patient had an opportunity to process, and provide constructive feedback to peers who shared.      Group Attendance:  Attended group session and Excused from group session    Patient's response to the group topic/interactions:  cooperative with task    Patient appeared to be Actively participating, Attentive and Engaged.        Client specific details:  Patient engaged in group discussion, providing appropriate feedback and sharing personal insights. Patient processed emotions of grief and loss for quitting alcohol, demonstrating insight in how her alcohol use became her only coping strategy when dealing with emotions.

## 2023-07-11 NOTE — GROUP NOTE
Psychoeducation Group Documentation    PATIENT'S NAME: Alistair Huggins  MRN:   [id number]  :   1980  ACCT. NUMBER: [id number]  DATE OF SERVICE: 23  START TIME:  3:00 PM  END TIME:  4:00 PM  FACILITATOR(S): Kal Tang LADC; Janice Hanks LADC; Haley Greco LADC  TOPIC: BEH Pyschoeducation  Number of patients attending the group:  19  Group Length:  1 Hours    Skills Group Therapy Type: Recovery skills and Healthy behaviors development    Summary of Group / Topics Discussed:    Balanced lifestyle skills and Symptom management skills    Group Attendance:  Attended group session    Patient's response to the group topic/interactions:  cooperative with task    Patient appeared to be Attentive and Engaged.         Client specific details: Pt took part in acupuncture, facilitated by Dr. Angela.

## 2023-07-12 ENCOUNTER — HOSPITAL ENCOUNTER (OUTPATIENT)
Dept: BEHAVIORAL HEALTH | Facility: CLINIC | Age: 43
Discharge: HOME | End: 2023-07-12
Attending: FAMILY MEDICINE
Payer: COMMERCIAL

## 2023-07-12 PROCEDURE — H2035 A/D TX PROGRAM, PER HOUR: HCPCS | Mod: HQ

## 2023-07-12 PROCEDURE — 1002N00001 HC LODGING PLUS FACILITY CHARGE ADULT

## 2023-07-12 ASSESSMENT — COLUMBIA-SUICIDE SEVERITY RATING SCALE - C-SSRS
2. HAVE YOU ACTUALLY HAD ANY THOUGHTS OF KILLING YOURSELF?: NO
ATTEMPT SINCE LAST CONTACT: NO
6. HAVE YOU EVER DONE ANYTHING, STARTED TO DO ANYTHING, OR PREPARED TO DO ANYTHING TO END YOUR LIFE?: NO
SUICIDE, SINCE LAST CONTACT: NO
TOTAL  NUMBER OF ABORTED OR SELF INTERRUPTED ATTEMPTS SINCE LAST CONTACT: NO
TOTAL  NUMBER OF INTERRUPTED ATTEMPTS SINCE LAST CONTACT: NO
1. SINCE LAST CONTACT, HAVE YOU WISHED YOU WERE DEAD OR WISHED YOU COULD GO TO SLEEP AND NOT WAKE UP?: NO

## 2023-07-12 NOTE — GROUP NOTE
Psychoeducation Group Documentation    PATIENT'S NAME: Alistair Huggins  MRN:   [id number]  :   1980  ACCT. NUMBER: [id number]  DATE OF SERVICE: 23  START TIME:  8:30 AM  END TIME:  9:30 AM  FACILITATOR(S): Raj Engle LADC; Chandra Richards MD; Janice Hanks LADC  TOPIC: BEH Pyschoeducation  Number of patients attending the group:  4  Group Length:  1 Hours    Skills Group Therapy Type: Recovery skills and Relationship skills development    Summary of Group / Topics Discussed:    Relationship/social skills and Balanced lifestyle skills          Group Attendance:  Attended group session    Patient's response to the group topic/interactions:  cooperative with task and listened actively    Patient appeared to be Attentive.         Client specific details:  Myrna gave appropriate feedback..

## 2023-07-12 NOTE — PROGRESS NOTES
Patient approached this worker in a very pleasant manner, requesting thoughts around,   and information about, pro-active sobriety-related medication options in place of Antabuse.  This worker gave information on naltrexone, (injectable) Vivitrol, and acamprosate.  In conjunction, initial provider appointment was made for 7/14,   to establish ongoing care and to discuss above medication questions.  Patient verbalized agreement surrounding above matters.  Treatment team updated accordingly as to above.  Will continue to monitor as prudent.

## 2023-07-12 NOTE — GROUP NOTE
"Group Therapy Documentation    PATIENT'S NAME: Alistair Huggins  MRN:   [id number]  :   1980  ACCT. NUMBER: [id number]  DATE OF SERVICE: 23  START TIME: 12:30 PM  END TIME:  2:30 PM  FACILITATOR(S): Sabine Funes LADC  TOPIC: BEH Group Therapy  Number of patients attending the group:  5  Group Length:  2 Hours    Group Therapy Type: Recovery strategies    Summary of Group / Topics Discussed:    Recovery Principles, Sober coping skills, and Disease of addiction    Facilitator led patients on a mindful walk during first part of group. Patients practiced the grounding technique, \"5 senses,\" during the walk, and processed insights gained during the walk. Facilitator then led group discussion on the disease of addiction and setting goals in early recovery. Patients identified and shared what \"a life worth living,\" looks like for them and discussed potential obstacles to obtaining dora and improving their quality of life. Patients each had an opportunity to process, and provide constructive feedback to peers who shared.      Group Attendance:  Attended group session    Patient's response to the group topic/interactions:  cooperative with task    Patient appeared to be Actively participating, Attentive and Engaged.        Client specific details:  Patient fully engaged in group walk, mindfulness activity, and discussion, providing appropriate feedback and sharing personal insights. Patient shared, \"my life worth living is with my family, doing a job I love.\"      "

## 2023-07-12 NOTE — GROUP NOTE
Group Therapy Documentation    PATIENT'S NAME: Alistair Huggins  MRN:   [id number]  :   1980  ACCT. NUMBER: [id number]  DATE OF SERVICE: 23  START TIME: 10:00 AM  END TIME: 11:30 AM  FACILITATOR(S): Josee Torres LADC  TOPIC: BEH Group Therapy  Number of patients attending the group: 4  Group Length:  2 Hours    Group Therapy Type: Recovery strategies, Daily living/independence skills, and Health and wellbeing     Summary of Group / Topics Discussed:    Balanced lifestyle, Mindfulness/Relaxation, and Self-care activities    Group Attendance: Attended group session.    Patients response to the group topic/interactions: cooperative with task, discussed personal experience with topic,  listened actively.     Patient appeared to be: Actively participating, Attentive, Engaged. ,     Client specific details: Patient checked-in feeling happy and eager.  Patient shared high for the week accupuncture and hope for tomorrow sleep better tonight. Patient participated in deep breathing exercise with relaxing visual aid or closing eyes. Patient shared affirmation organized, goal for today help from a friend , who they can ask for help, friend and grateful for son.  Discussed reading, verbalized reflection- learning more from failures.

## 2023-07-12 NOTE — PROGRESS NOTES
"INDIVIDUAL SESSION SUMMARY    D) Met with client on 23 from 1:30-2:30pm. Client is in the [facility].    Client's Statement of Presenting Concern:    Client spoke of stressors including: relational concerns with S.O., separation from S.O. and children, unemployment and financial strain. Client shared that her alcoholism increased when she was feeling overwhelmed and isolated after moving from [place] to [place] four years ago.    Social History:    Client spoke about childhood including being the only child of her parents who both struggled with MIGUEL ANGEL, housing instability, and time in foster care. Client reported that her other  in  from alcoholism. Client reported that her dad has been sober about 6 years.      Client reported their relationship status as:  and  since 2022.     Client reported to have two children ages 11 and 12 who are staying with her S.O..     Client reported their housing is: living in a rental property.     Client reported that their employment status is: unemployed since entering this progam.     Client identified stable and meaningful social connections including: a few friends in [place] and her dad in [place]. Client reported that the relationship with her S.O.'s family in [place] is strained.      Mental Health History:    Client reported to have a medication provide provider: n/a.     Client reported to have a therapist: n/a.    Client reported feeling \"anxious\" but no diagnosis and no history of receiving therapy.     Client reported that some family members struggle with mental health issues including: n/a      Safety: denies current suicidal or homicidal ideation, plan, or intent.    Chemical Health History:    Client reported that this is her 1st treatment program.     Client reported that some family members struggle with substance abuse issues including: both parents     Significant Losses / Trauma / Abuse / Neglect Issues:    Client reported past traumatic " experience(s) or abuse including: parents' MIGUEL ANGEL, housing instability, foster care, death of mother in 2014, S.O.'s stroke in 2019 right before their move to [place], separation from S.O. on Nov 2022.     Medical Issues:    Client reports no current medical concerns.     Client reported no issues with sleep.    Patient's Strengths and Limitations:    Client identified the following strengths or resources that will help them succeed in counseling: exercise routine, family support, intelligence, motivation, sober support group / recovery support , sponsor and work ethic.     Client identified the following current supports: family and sober support group / recovery support .     Things that may interfere with the client's success in counseling include: lack of social support, unsupportive environment and grief and loss.    I)    Provided client with verbal interventions including validation, compassion, and support.     Provided psychoeducation on: childhood trauma and MIGUEL ANGEL.     A)     Client appears to have experienced early attachment disruption, resulting in lack of trust, loss of safety, fear of abandonment, and symptoms of co-dependency.     Client appears to have trouble identifying emotions, regulating impulses, and managing stress.     Client appears to lack a sober support network.     Client appears to have internal motivation and would benefit from continued support with a focus on processing past traumas and losses, stress management, impulse control, relapse prevention, increasing resiliency and self-esteem.     Client tends towards self-defeating, self-sabotaging patterns as demonstrated by over-working, not asking for help and taking on too much.     P) Next session is scheduled for 7/13/23.       ALEJANDRA Barkley  7/12/2023

## 2023-07-12 NOTE — PROGRESS NOTES
[facility] Weekly Treatment Plan Review    Date span:  7/3/2023 - 2023    Patient did not have any absences during this time period (list absence dates and reason for absence).  [facility] to [facility] on [facility]23 and attended all programming as scheduled since intake.      Weekly Treatment Plan Review     Treatment Plan initiated on: 2023    Dimension1: Acute Intoxication/Withdrawal Potential -   Previous Dimension Ratin  Current Dimension Ratin  Date of Last Use 2023  Any reports of withdrawal symptoms - No  [facility] to [facility] on 2023 after admitting to [facility] where she was assessed to meet criteria for Alcohol Use Disorder, Severe (F10.20). No signs or symptoms of intoxication at intake. Patient denies any current post-acute withdrawal symptoms. Since admission, patient appears to remain abstinent from alcohol and non-prescribed, mood-altering substances, based on staff observations, patient self-report, and negative breathalyzer test results on 2023. Patient verbalizes a desire to explore medication management for alcohol cravings and notes she will coordinate with [facility] nursing staff to obtain an appointment with Addiction Medicine at [facility].    Dimension 2: Biomedical Conditions & Complications -   Previous Dimension Ratin  Current Dimension Ratin  Medical Concerns:  Patient denies  Current Medications & Medication Changes:  Current Outpatient Medications   Medication     acetaminophen (TYLENOL) 325 MG tablet     alum & mag hydroxide-simethicone (MAALOX) 200-200-20 MG/5ML SUSP suspension     benzocaine-menthol (CEPACOL) 15-3.6 MG lozenge     folic acid (FOLVITE) 1 MG tablet     guaiFENesin (ROBITUSSIN) 20 mg/mL liquid     ibuprofen (ADVIL/MOTRIN) 200 MG tablet     loratadine (CLARITIN) 10 MG tablet     melatonin 3 MG tablet     multivitamin, therapeutic (THERA-VIT) TABS tablet     nicotine  (NICORETTE) 4 MG gum     senna-docusate (SENOKOT-S/PERICOLACE) 8.6-50 MG tablet     thiamine (B-1) 100 MG tablet     triamcinolone (KENALOG) 0.1 % external cream     No current facility-administered medications for this encounter.     Facility-Administered Medications Ordered in Other Encounters   Medication     Self Administer Medications: Behavioral Services     Medication Prescriber:  [facility]  Taking meds as prescribed? Yes  Medication side effects or concerns:  Patient reports none  Outside medical appointments this week (list provider and reason for visit):  Patient reports none  Patient completed intake with + nursing staff on 2023 and verbalized no current concerns. Patient reports she will coordinate with + nursing staff on obtaining a primary care provider and to explore medication management for alcohol cravings. Patient endorses daily medication compliance and healthy eating habits. Patient attended + nursing skills lecture on 2023, gaining knowledge on HIV/AIDS treatment and prevention as part of an overall healthy lifestyle in recovery. Patient denies any current biomedical concerns.    Dimension 3: Emotional/Behavioral Conditions & Complications -   Previous Dimension Ratin  Current Dimension Ratin  PHQ2:       2023    11:00 AM 3/31/2023     8:06 AM 2023     5:36 PM 3/28/2022     7:01 AM   PHQ-2 (  [employer])   Q1: Little interest or pleasure in doing things 0 0 0 0   Q2: Feeling down, depressed or hopeless 0 0 1 0   PHQ-2 Score 0 0 1 0   Q1: Little interest or pleasure in doing things  Not at all Not at all Not at all   Q2: Feeling down, depressed or hopeless  Not at all Several days Not at all   PHQ-2 Score  0 1 0      GAD2:       2023    11:00 AM   JESSICA-2   Feeling nervous, anxious, or on edge 1   Not being able to stop or control worrying 1   JESSICA-2 Total Score 2     Mental health diagnosis: anxiety; depression  Date of last SIB:  Patient denies  "history of or current SIB  Date of  last SI:  Patient reports passive SI in the past month  Date of last HI: Patient denies history of or current HI  Behavioral Targets:  grief and loss; low self-worth; anxiety; depression; shame  Risk factors:  Lack of mental health services; lack of sober support; isolation; depression; past trauma  Protective factors:  restricted access to lethal means (firearms), dedication to family/friends, adherence with prescribed medication, agreement to use safety plan and access to a variety of clinical interventions  Current MH Assignments:  Uncovering the Roots of Your Perfectionism; Confront Shame; Ending Our Resentments    Narrative:  Patient reports moderate anxiety symptoms since admission, noting, \"stress has gone up due to trying to stay sober.\" Patient began 1:1 therapy sessions with [facility] staff psychotherapist on 7/11/2023 and notes weekly sessions while she is in treatment. Patient denies any current suicidal ideation, plans, or means. Per [facility], patient is currently at very low risk for suicide.    Dimension 4: Treatment Acceptance / Resistance -   Previous Dimension Rating:  3  Current Dimension Rating:  3  MIGUEL ANGEL Diagnosis:  Alcohol Use Disorder   303.90 (F10.20) Severe In a controlled environment  Commitment to tx process/Stage of change- Contemplation Stage of Change  MIGUEL ANGEL assignments - Use History; [address] [address] [facility]    Narrative - [facility] on 7/[facility]/2023 and since intake, attends programming as scheduled and actively participates in group discussion and activities. Patient initiated her treatment plan with primary counselor on 7/12/2023. Patient reports her motivation for her treatment and recovery goals are, \"my health, my marriage, and my children.\" Patient remains assessed to be in the contemplation stage of change due to limited time in treatment to address her ongoing ambivalence around the severity of her substance use disorder and impact it " "has on her mental health and quality of life.    Dimension 5: Relapse / Continued Problem Potential -   Previous Dimension Ratin  Current Dimension Ratin  Relapses this week - None  Urges to use - YES, List Patient reports mild, sporadic cravings  UA results - 2023    Narrative- Patient reports mild, sporadic cravings since admission, but is unable to attribute these to any specific triggers. Patient attended relapse prevention workshops on 2023 and 2023, gaining insight in how setting healthy boundaries can benefit recovery goals and avert return to use of substances. Patient remains a high risk for return to use of alcohol due to limited time in treatment to develop insight into personal triggers to use and develop coping strategies to avoid relapse.    Dimension 6: Recovery Environment -   Previous Dimension Ratin  Current Dimension Ratin  Family Involvement - N/A  Summarize attendance at family groups and family sessions - N/A  Family supportive of treatment?  Yes    Community support group attendance - Patient attends sober support meetings nightly since admission.  Recreational activities - Patient reports exercising and socializing with peers.    Narrative - Patient reports attending nightly sober support meetings since intake. Patient reports communication with her  and children, noting she continues to build trust in her relationships. Patient attended spirituality with [facility]  on 7/10/2023, gaining insight in how gratitude practice can benefit recovery goals. Patient states her goal this week is, \"to start on my assignments and think about aftercare.\"    Progress made on transition planning goals: Patient reports contemplating outpatient treatment and will meet with primary counselors to determine continuing care plan prior to program completion.    Justification for Continued Treatment at this Level of Care:  Patient remains a high risk for return to use of " alcohol due to reported cravings and limited time in treatment to develop insight into personal triggers to use and develop coping strategies to avoid relapse.  Treatment coordination activities this week:  None currently  Need for peer recovery support referral? No    Discharge Planning:  Target Discharge Date/Timeframe:  8/4/2023  Med Mgmt Provider/Appt:  TBD   Ind therapy Provider/Appt:  TBD   Family therapy Provider/Appt:  TBD   Other referrals:  TBD      Has vulnerable adult status change? No    Interdisciplinary Clinical Supervision including: LADC, Mental health professional, RN and Supervisor/manager    Are Treatment Plan goals/objectives effective? Yes  *If no, list changes to treatment plan:    Are the current goals meeting client's needs? Yes  *If no, list the changes to treatment plan.    Client response:  Patient reports treatment goals remain appropriate at this time.    Plan:  Continue per Master Treatment Plan    *Client agrees with any changes to the treatment plan: N/A  *Client received copy of changes: N/A  *Client is aware of right to access a treatment plan review: Yes

## 2023-07-13 ENCOUNTER — HOSPITAL ENCOUNTER (OUTPATIENT)
Dept: BEHAVIORAL HEALTH | Facility: CLINIC | Age: 43
Discharge: HOME | End: 2023-07-13
Attending: FAMILY MEDICINE
Payer: COMMERCIAL

## 2023-07-13 PROCEDURE — H2035 A/D TX PROGRAM, PER HOUR: HCPCS | Mod: HQ

## 2023-07-13 PROCEDURE — 1002N00001 HC LODGING PLUS FACILITY CHARGE ADULT

## 2023-07-13 PROCEDURE — H2035 A/D TX PROGRAM, PER HOUR: HCPCS

## 2023-07-13 NOTE — GROUP NOTE
Group Therapy Documentation    PATIENT'S NAME: Alistair Huggins  MRN:   [id number]  :   1980  ACCT. NUMBER: [id number]  DATE OF SERVICE: 23  START TIME: 12:30 PM  END TIME:  2:30 PM  FACILITATOR(S): Janice Hanks LADC  TOPIC: BEH Group Therapy  Number of patients attending the group:  5    Group Length:  2 Hours    Group Therapy Type: Recovery strategies, Emotion processing, and Daily living/independence skills    Summary of Group / Topics Discussed:    Recovery Principles, Sober coping skills, Relationship/socialization, and Relapse prevention      Group Attendance:  Attended group session    Patient's response to the group topic/interactions:  cooperative with task    Patient appeared to be Actively participating.        Client specific details:  Patient attended group session and was attentive and participative.

## 2023-07-13 NOTE — PROGRESS NOTES
"INDIVIDUAL SESSION SUMMARY    D) Met with client on 7/13/23 from 1:30-2:30pm. [facility] is in [facility]. Client shared that she's in the process of deciding where she will live and what kind of support she needs when leaving this program. Client spoke of wanting to hold herself accountable to find her sober community and the importance of returning to work for the family's finances. Client shared feeling hurt that her S.O. did not bring the kids to visiting hours on Tuesday night. Client spoke of the challenged she's had communicating with her S.O. since his stroke and feeling like she lost her \"partner\". Client shared that she has hope for their marriage but is unsure of her S.O.'s commitment. Client shared that she's have many new insights into her life and is finding this program helpful. Client showed the [employer] homework that she'd been assigned by her primary counselor. Client spoke of wanting to better understand her childhood experiences.     I)    Provided client with verbal interventions including validation, compassion, and support.    Identified some patterns of under communication and conflict avoidance with S.O..    Provided positive reinforcement for progress towards goals, gains in insight, and application of skills.     Discussed the importance of recovery behaviors such as forming/utilizing a sober support network, daily rituals, goal setting, and identifying relapse warning signs.     A)     Client appears to have experienced early attachment disruption, resulting in lack of trust, loss of safety, fear of abandonment, and symptoms of co-dependency.     Client appears to have trouble identifying emotions, regulating impulses, and managing stress.     Client appears to lack a sober support network.     Client appears to have internal motivation and would benefit from continued support with a focus on processing past traumas and losses, stress management, impulse control, relapse prevention, " increasing resiliency and self-esteem.     Client tends towards self-defeating, self-sabotaging patterns as demonstrated by over-working, not asking for help and taking on too much.     P) Next session is scheduled for 7/20/23.     Yennifer Scott, ALEJANDRA  7/13/2023

## 2023-07-13 NOTE — GROUP NOTE
Group Therapy Documentation    PATIENT'S NAME: Alistair Huggins  MRN:   [id number]  :   1980  ACCT. NUMBER: [id number]  DATE OF SERVICE: 23  START TIME:  3:00 PM  END TIME:  4:00 PM  FACILITATOR(S): Haley Greco LADC; Sammi Juarez LADC; Boni Randolph LADC  TOPIC: BEH Group Therapy  Number of patients attending the group:  22  Group Length:  1 Hours    Group Therapy Type: Recovery strategies    Summary of Group / Topics Discussed:    Co-occurring illnesses symptom management and Coping/DBT informed care    Group Attendance:  Attended group session    Patient's response to the group topic/interactions:  cooperative with task    Patient appeared to be Attentive and Engaged.        Client specific details: Pt listened respectfully to a presentation on DBT skills and asked appropriate questions.

## 2023-07-13 NOTE — GROUP NOTE
Group Therapy Documentation    PATIENT'S NAME: Alistair Huggins  MRN:   [id number]  :   1980  ACCT. NUMBER: [id number]  DATE OF SERVICE: 23  START TIME:  9:00 AM  END TIME: 11:00 AM  FACILITATOR(S): Josee Torres LADC  TOPIC: BEH Group Therapy  Number of patients attending the group:  5  Group Length:  2 Hours    Group Therapy Type: Recovery strategies    Summary of Group / Topics Discussed:    Recovery Principles, Mindfulness/Relaxation, and Emotions/expression      Client specific details: Patient checked-in feeling well rested, content, peaceful and tranquil.  Patient participated in deep breathing exercise with body scan. Patient shared affirmation well rested, goal for today finish assignment, who they can ask for help, therapist and grateful for good night sleep.  Discussed reading, verbalized reflection- doing something new. Facilitator led discussion on motivation, losing motivation and ways to feed sobriety each day. Patient shared motivation is health, and what causes loss of motivation sheer exhauction.

## 2023-07-14 ENCOUNTER — HOSPITAL ENCOUNTER (OUTPATIENT)
Dept: BEHAVIORAL HEALTH | Facility: CLINIC | Age: 43
Discharge: HOME | End: 2023-07-14
Attending: FAMILY MEDICINE
Payer: COMMERCIAL

## 2023-07-14 ENCOUNTER — LAB (OUTPATIENT)
Dept: LAB | Facility: CLINIC | Age: 43
End: 2023-07-14
Payer: COMMERCIAL

## 2023-07-14 ENCOUNTER — VIRTUAL VISIT (OUTPATIENT)
Dept: ADDICTION MEDICINE | Facility: CLINIC | Age: 43
End: 2023-07-14
Payer: COMMERCIAL

## 2023-07-14 DIAGNOSIS — F10.10 ALCOHOL ABUSE: ICD-10-CM

## 2023-07-14 DIAGNOSIS — R74.8 ELEVATED LIVER ENZYMES: ICD-10-CM

## 2023-07-14 DIAGNOSIS — F10.90 ALCOHOL USE DISORDER: Primary | ICD-10-CM

## 2023-07-14 LAB
ALBUMIN SERPL BCG-MCNC: 4.8 G/DL (ref 3.5–5.2)
ALP SERPL-CCNC: 48 U/L (ref 35–104)
ALT SERPL W P-5'-P-CCNC: 231 U/L (ref 0–50)
AST SERPL W P-5'-P-CCNC: 79 U/L (ref 0–45)
BILIRUB DIRECT SERPL-MCNC: <0.2 MG/DL (ref 0–0.3)
BILIRUB SERPL-MCNC: 0.2 MG/DL
PROT SERPL-MCNC: 7.2 G/DL (ref 6.4–8.3)

## 2023-07-14 PROCEDURE — H2035 A/D TX PROGRAM, PER HOUR: HCPCS | Mod: HQ

## 2023-07-14 PROCEDURE — 99204 OFFICE O/P NEW MOD 45 MIN: CPT | Mod: VID

## 2023-07-14 PROCEDURE — 80074 ACUTE HEPATITIS PANEL: CPT

## 2023-07-14 PROCEDURE — 82040 ASSAY OF SERUM ALBUMIN: CPT

## 2023-07-14 PROCEDURE — 1002N00001 HC LODGING PLUS FACILITY CHARGE ADULT

## 2023-07-14 PROCEDURE — 36415 COLL VENOUS BLD VENIPUNCTURE: CPT

## 2023-07-14 RX ORDER — NALTREXONE HYDROCHLORIDE 50 MG/1
50 TABLET, FILM COATED ORAL DAILY
Qty: 30 TABLET | Refills: 0 | Status: SHIPPED | OUTPATIENT
Start: 2023-07-14 | End: 2023-08-04

## 2023-07-14 ASSESSMENT — PATIENT HEALTH QUESTIONNAIRE - PHQ9
SUM OF ALL RESPONSES TO PHQ QUESTIONS 1-9: 1
SUM OF ALL RESPONSES TO PHQ QUESTIONS 1-9: 1
10. IF YOU CHECKED OFF ANY PROBLEMS, HOW DIFFICULT HAVE THESE PROBLEMS MADE IT FOR YOU TO DO YOUR WORK, TAKE CARE OF THINGS AT HOME, OR GET ALONG WITH OTHER PEOPLE: NOT DIFFICULT AT ALL

## 2023-07-14 NOTE — GROUP NOTE
"Group Therapy Documentation    PATIENT'S NAME: Alistair Huggins  MRN:   [id number]  :   1980  ACCT. NUMBER: [id number]  DATE OF SERVICE: 23  START TIME:  9:00 AM  END TIME: 11:00 AM  FACILITATOR(S): Sabine Funes LADC  TOPIC: BEH Group Therapy  Number of patients attending the group:  5  Group Length:  2 Hours    Group Therapy Type: Recovery strategies    Summary of Group / Topics Discussed:    Sober coping skills, Disease of addiction, and Relapse prevention    Patients checked in to primary group by describing current mood, stating a goal for the day, and sharing a gratitude and affirmation. Facilitator led group discussion on stages of change in recovery. Patients gained knowledge on assessing where they are in the stages of change, developing insight into their internal motivation for making behavior changes in recovery. Patients then shared how they can move forward into preparation and action stages of change by making positive behavior changes to avoid return to use of substances and improve their quality of life. Each patient had an opportunity to process and provide constructive feedback to peers who shared.      Group Attendance:  Attended group session    Patient's response to the group topic/interactions:  cooperative with task    Patient appeared to be Actively participating, Attentive and Engaged.        Client specific details:  Patient fully engaged in group discussion, providing appropriate feedback and sharing personal insight on the topic of change. Patient presented her assignment on post-acute withdrawal, sharing, \"it made me really think about my overall self-care, not just with post acute withdrawal, but in general, I am not always great about nutrition and sleep.\"      "

## 2023-07-14 NOTE — PROGRESS NOTES
"Answers for HPI/ROS submitted by the patient on 7/14/2023  If you checked off any problems, how difficult have these problems made it for you to do your work, take care of things at home, or get along with other people?: Not difficult at all  PHQ9 TOTAL SCORE: 1    Video-Visit Details    Type of service:  Video Visit    Video Start Time: 1319  Video End Time: 1347    Originating Location (pt. Location): Other [facility]    Distant Location (provider location):  Recovery Clinic     Platform used for Video Visit: [facility]          ZEHRA                                                      Alistair Huggins is a 43 year old female who presents for  initial visit for addiction consultation and management referred by [facility] due to concerns for Alcohol Use Disorder (AUD). Myrna started going through a separation in March/April and reports that her alcohol use escalated to daily  Use of 4-5 glasses of spirits nightly. She recognized it being an issue and sought admission and treatment through detox. She's currently     Visit performed Virtual, via video    HPI:   Alistair Huggins is a 43 year old female with history of alcohol use, and elevated Liver function tests use who presents for further evaluation of possible substance use disorder and management options. Myrna is currently in treatment at [facility] set to be discharged August 4.  She plans on continuing with outpatient treatment, therapy and AA.       Substance Use History:   \"Have you ever had any history with [...] use?\" And \"When was your last use?    ALCOHOL - last use July 3 or 4, detox. Typical pattern escalating since separationl. 5 drinks every evening for several months.     CANNABIS - no    PRESCRIPTION STIMULANTS (includes Ritalin, Adderall, Vyvanse) - no    COCAINE/CRACK - no    METH/AMPHETAMINES (includes ecstacy, MDMA/wilfrid) - no    OPIATES - no    BENZODIAZEPINES (includes Ativan, Klonopin, Xanax) - no    KRATOM (mild opioid and " "stimulant effects) - no    KETAMINE - no    HALLUCINOGENS (includes DXM) - no    BEHAVIORAL (Gambling, Eating d/o, Compulsivity) - no  o History of treatment - no    NICOTINE  o Cigarettes: no  o Chew/snus: no  o Vaping: no  o Past NRT/medication use: nicorette 4 mg       Previous withdrawal treatment episodes (e.g. detox): no    Previous [facility] treatment programs: no    Hospitalizations or overdose: no    Medical complications from substance use: Elevated LFT's    IV Drug use?: no    Previous Medication for Addiction Tx: no    Longest period of full abstinence: off and on months, longest sustained 1 year, and pregnancy    Activities that have previously supported abstinence: \"just didn't use\"    Current Recovery Activities: treatment at [facility], outpatient counseling, therapy, and aa       Infectious disease screening  Hep C:   Hepatitis C Antibody   Date Value Ref Range Status   03/28/2022 Nonreactive Nonreactive Final       HIV:   HIV Antigen Antibody Combo   Date Value Ref Range Status   03/28/2022 Nonreactive Nonreactive Final     Comment:     HIV-1 p24 Ag & HIV-1/HIV-2 Ab Not Detected       DSM5 Substance Use Disorder Criteria (2-3=mild, 4-5=moderate, 6+=severe):    Substance is often taken in larger amounts OR over a longer period than was intended: Yes    There is a persistent desire OR unsuccessful efforts to cut down or control substance use: Yes    A great deal of time is spent in activities necessary to obtain the substance, use the substance, or recover from its effects: Yes    Craving, or a strong desire to use substances: Yes    Recurrent substance use resulting in a failure to fulfil major obligations at work, school, or home: Yes    Continued substance use despite having persistent or recurrent social or interpersonal problems caused or exacerbated by the effects of the substance: Yes    Important social, occupational, or recreational activities are given up or reduced because of the substance: " Yes    Recurrent substance use in situations in which it is physically hazardous: No    Substance use is continued despite knowledge of having a persistent or recurrent physical or psychological problem that is likely to have been caused or exacerbated by the substance: Yes    Tolerance, as defined by either of the following: a) a need for markedly increased amounts of the substance to achieve intoxication or desired effect. b) a markedly diminished effect with continued use of the same amount of the substance: Yes    Withdrawal, as manifested by either of the following: a) the characteristic withdrawal syndrome. b) substance (or a closely-related substance) is taken to relieve or avoid withdrawal symptoms: Yes.  Shaky, panicky, anxiety. No history of complicated withdrawal       Pregnancy Status Offered, declined (if no HcG in ED and patient is of childbearing years, please offer urine HcG)  LMP: three weeks ago  Sexually active: no  Birth control/barriers:     Psychiatric History (per patient report and problem list review)  Past diagnoses - none  Current or past psychiatrist: none  Current or past therapist:  non  Hospitalizations/TMS/ECT -  none  Suicide Attempts - none  Medication trials - none       PHQ-9 scores:      3/31/2023     8:01 AM 7/7/2023    10:00 AM 7/14/2023    12:35 PM   PHQ   PHQ-9 Total Score 2 8 1   Q9: Thoughts of better off dead/self-harm past 2 weeks Not at all Several days Not at all     JESSICA-7 scores:      3/31/2023     8:01 AM 7/7/2023    10:00 AM   JESSICA-7 SCORE   Total Score 2 (minimal anxiety)    Total Score 2 6         SOCIAL HISTORY:  Housing status: single family home   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 No           Medical History:    Patient Active Problem List    Diagnosis Date Noted     Chemical dependency (H) 07/07/2023     Priority: Medium        No past medical history on file.    No past surgical history on file.      Family History   Problem Relation Age of Onset     Kidney Disease Mother 50        kidney failure     Diabetes Father      Cerebrovascular Disease Paternal Grandmother          Current Outpatient Medications   Medication Sig Dispense Refill     nicotine (NICORETTE) 4 MG gum Place 1 each (4 mg) inside cheek every hour as needed for smoking cessation 120 each 1     triamcinolone (KENALOG) 0.1 % external cream Apply topically 2 times daily Apply to affected area 45 g 2     acetaminophen (TYLENOL) 325 MG tablet Take 325-650 mg by mouth every 4 hours as needed for mild pain       alum & mag hydroxide-simethicone (MAALOX) 200-200-20 MG/5ML SUSP suspension Take 30 mLs by mouth every 6 hours as needed for indigestion       benzocaine-menthol (CEPACOL) 15-3.6 MG lozenge Place 1 lozenge inside cheek every 2 hours as needed for sore throat       folic acid (FOLVITE) 1 MG tablet Take 1 mg by mouth daily       guaiFENesin (ROBITUSSIN) 20 mg/mL liquid Take 200 mg by mouth every 4 hours as needed for cough       ibuprofen (ADVIL/MOTRIN) 200 MG tablet Take 200-400 mg by mouth every 6 hours as needed for pain       loratadine (CLARITIN) 10 MG tablet Take 10 mg by mouth daily as needed for allergies       melatonin 3 MG tablet Take 3 mg by mouth nightly as needed for sleep       multivitamin, therapeutic (THERA-VIT) TABS tablet Take 1 tablet by mouth daily       senna-docusate (SENOKOT-S/PERICOLACE) 8.6-50 MG tablet Take 2 tablets by mouth daily as needed for constipation       thiamine (B-1) 100 MG tablet Take 100 mg by mouth daily           No Known Allergies        OBJECTIVE                                                      EXAM    [facility] 06/21/2023     GENERAL: healthy, alert and no distress  EYES: Eyes grossly normal to inspection, PERRL and conjunctivae and sclerae normal  RESP: No respiratory distress  MENTAL STATUS EXAM  Appearance/Behavior: No  appearant distress  Speech: Normal  Mood/Affect: normal affect  Insight: Adequate      LAB  Recent UDS Labs (may not contain today's lab data)  No results found for: BUP, BZO, BAR, GERARDO, MAMP, AMP, MDMA, MTD, PYM604, OXY, PCP, THC, TEMP, SGPOCT        Hepatic Function  AST   Date Value Ref Range Status   07/14/2023 79 (H) 0 - 45 U/L Final     Comment:     Reference intervals for this test were updated on 6/12/2023 to more accurately reflect our healthy population. There may be differences in the flagging of prior results with similar values performed with this method. Interpretation of those prior results can be made in the context of the updated reference intervals.     ALT   Date Value Ref Range Status   07/14/2023 231 (H) 0 - 50 U/L Final     Comment:     Reference intervals for this test were updated on 6/12/2023 to more accurately reflect our healthy population. There may be differences in the flagging of prior results with similar values performed with this method. Interpretation of those prior results can be made in the context of the updated reference intervals.       Bilirubin Total   Date Value Ref Range Status   07/14/2023 0.2 <=1.2 mg/dL Final     Albumin   Date Value Ref Range Status   07/14/2023 4.8 3.5 - 5.2 g/dL Final     INR   Date Value Ref Range Status   07/05/2023 0.96 0.85 - 1.15 Final       CBC  WBC Count   Date Value Ref Range Status   07/05/2023 4.1 4.0 - 11.0 10e3/uL Final     RBC Count   Date Value Ref Range Status   07/05/2023 4.45 3.80 - 5.20 10e6/uL Final     Hemoglobin   Date Value Ref Range Status   07/05/2023 14.6 11.7 - 15.7 g/dL Final     Hematocrit   Date Value Ref Range Status   07/05/2023 43.3 35.0 - 47.0 % Final     MCV   Date Value Ref Range Status   07/05/2023 97 78 - 100 fL Final     MCH   Date Value Ref Range Status   07/05/2023 32.8 26.5 - 33.0 pg Final     MCHC   Date Value Ref Range Status   07/05/2023 33.7 31.5 - 36.5 g/dL Final     Platelet Count   Date Value Ref Range  Status   07/05/2023 219 150 - 450 10e3/uL Final     RDW   Date Value Ref Range Status   07/05/2023 12.5 10.0 - 15.0 % Final       Today's lab data  Results for orders placed or performed in visit on 07/14/23   Hepatic panel (Albumin, ALT, AST, Bili, Alk Phos, TP)     Status: Abnormal   Result Value Ref Range    Protein Total 7.2 6.4 - 8.3 g/dL    Albumin 4.8 3.5 - 5.2 g/dL    Bilirubin Total 0.2 <=1.2 mg/dL    Alkaline Phosphatase 48 35 - 104 U/L    AST 79 (H) 0 - 45 U/L     (H) 0 - 50 U/L    Bilirubin Direct <0.20 0.00 - 0.30 mg/dL           [place] Board  Pharmacy Data Base Reviewed;    Yes Consistent with patient reports and Epic records.           A/P                                                      ASSESSMENT/PLAN:    1. Alcohol use disorder, Severe  2. Alcohol abuse  - naltrexone (DEPADE/REVIA) 50 MG tablet; Take 1 tablet (50 mg) by mouth daily  Dispense: 30 tablet; Refill: 0.  Risk/ benefit discussion regarding low potential of increasing liver function labs. Will monitor LFT's with ongoing use of Naltrexone.   -Myrna interested in Vivatrol.  Will start oral Naltrexone and  Monitor for side effects. Will start PA process.   -Continue with [facility] treatment and follow ongoing recommendations  -Continue to abstain from alcohol       3. Elevated liver enzymes  - Several month period of binge drinking    - Hepatic panel (Albumin, ALT, AST, Bili, Alk Phos, TP); Future  - Acute hepatitis panel; Future  -Abstain from all alcohol use.   -Monitor LFT's           Counseled the patient on the importance of having a recovery program in addition to medication to manage recovery.  Components include avoiding isolating, having willingness to change, avoiding triggers and managing cravings. Encouraged having some type of sober network and practicing honesty with trusted support person(s). Encouraged other services such as counseling, 12 step or other self-help organizations.      RTC      Follow up 1  week virtual       Regina Joyner NP  [facility]  [phone]

## 2023-07-14 NOTE — NURSING NOTE
Is the patient currently in the state of [place]? YES - at rehab center, [facility].    Visit mode:VIDEO    If the visit is dropped, the patient can be reconnected by: VIDEO VISIT: Text to cell phone:   Telephone Information:   Mobile [phone]       Will anyone else be joining the visit? No  (If patient encounters technical issues they should call [phone])    How would you like to obtain your AVS? MyChart    Are changes needed to the allergy or medication list? NO    Rooming Documentation: Assigned questionnaire(s) completed .     Provider messaged me and asked to inform pt that they will be connecting in video closer towards 1:15 pm. Pt is aware and will reconnect in video by 1:15 pm.     Reason for visit: Consult     ROSALBA Patrick

## 2023-07-14 NOTE — PATIENT INSTRUCTIONS
Patient Education   Addiction Medicine  What to Expect  Here's what to expect from our Addiction Medicine program.  About Addiction Medicine  Addiction Medicine clinics help you with substance use problems. You set your own goals. We try to help you reach your goals. Your care plan can include:  Medicine  Creating a recovery plan  Helping you find local resources  Helping with treatment options  Clinic phone number and addresses  Clinic Phone: [phone]  Mental Health and Addiction Clinic  [facility]  [address], [address]  [place] [address]  [facility]  [address], [address]  [place] [address]  Walk-in services  We offer walk-in care for patients at the Recovery Clinic. This is only for patients with Opioid Use Disorder (OUD). Anyone with OUD is welcome. Our providers will refer you to the Recovery Clinic if you're struggling to keep up with your medicines or appointments.  Recovery Clinic ([facility])  [address], [address]  [place] [address]  Phone: [phone]  The Recovery Clinic is open for walk-ins Monday to Friday 9 a.m. to 11:30 a.m. and 12:30 p.m. to 3 p.m.  How it works  Come to your visits every time. The treatment works better when you do.   You can have as many visits as you need. When you're better, we'll refer you back to being cared for by your family doctor.   If you need it, we'll send you to doctors, psychiatrists, therapists, and other providers. We focus on treating addiction. We don't treat other problems, like managing other medicines or non-addiction issues.  About visits  Urine drug testing  We'll often test your pee (urine) for drugs. This is the only way we can know for sure whether or not you're using drugs. It helps us treat you without judgement.     Cancelling visits  If you can't come to your visit, please call us right away at [phone]. If you don't cancel at least 24 hours (1  "full day) before your visit, that's \"late cancellation.\"  Being late to visits  If you come late, you may not be seen. This will count as a \"no-show.\"  Please call the clinic if you're running late. This will help us plan, but it doesn't mean you'll be seen.   Being late is:  More than 15 minutes late for a return visit.  More than 30 minutes late for your first visit.  If you cancel late or don't show up 2 times within 6 months, we may transfer you to another clinic.   Getting help between visits  If you need help between visits, you can call us Monday to Friday from 8 a.m. to 4:30 p.m. at [phone]. You can also send us a message on [employer].  Medicine refills  If you miss or cancel a visit, you can still ask for a refill. But we can only refill your medicines if you've made a new appointment.  Please call your pharmacy for medicine refills. If you have a question about your refill, call us at [phone].  It takes up to 2 business days to refill your drugs. Let us know 2 to 3 days before you run out. Don't call more than 1 week before you run out. That's too early.   Please make sure we have your right phone number.  If we have a problem with your refill, we'll call you. If we call you, please call us back right away. If you don't, you may not get your medicines quickly.   Call your pharmacy to find out if your medicines are ready.   Keep your medicines in a safe place. Keep them away from pets and children. If your medicines are lost or stolen, we usually don't replace them. We recommend you file a police report if your medicines are stolen. Your insurance may not pay for early refills, even if you have a prescription.  Forms  Please give us at least 3 business days to fill out any forms. Bring the forms to your visits if you can. We may refer you to other members of your care team to complete the forms.   Emergency care   Call 911 or go to the nearest emergency room if your life or someone else's life " is in danger.  Call 988 anytime for the Suicide and Crisis Lifeline.  If you need care when we're closed, call your family doctor to see if they can help. You can also go to urgent care or an emergency room. [facility] emergency rooms may be able to give you buprenorphine or other medicine refills.  Thank you for choosing us for your care.  For informational purposes only. Not to replace the advice of your health care provider. Copyright   2023 [facility]. All rights reserved. [employer] 602376 - REV 05/23.     Alcohol Use Disorder  The diagnosis of alcohol use disorder is made using the DSM-V criteria for Substance Use Disorders - https://bit.ly/3DBJRId (link to Psychology Today report on these criteria). The FDA has 3 medications approved for alcohol use disorder - naltrexone (Vivitrol injection), acamprosate (Campral), and disulfiram (Antabuse).    For your reading purposes - a neurotransmitter is a chemical released in the brain that provides a signal directing the activity of nerves. The result is a predictable response based on the type of neurotransmitter. For example, serotonin, dopamine and norepinephrine (closely related to adrenaline) are all neurotransmitters.    Naltrexone (Vivitrol injection)  Naltrexone blocks the opioid receptors in the brain. It is basically naloxone (Narcan) in pill form. Why does an opioid blocker help with alcohol use? Our opioid receptors are part of the reward signaling during alcohol consumption, closely associated with dopamine release. Dopamine is a neurotransmitter synonymous with  reward . ALL substances that can cause addiction release dopamine at high levels. So, when naltrexone blocks opioid receptors, we feel less reward when consuming alcohol. This also leads to less craving BEFORE drinking alcohol, since craving is also associated with these neurotransmitters. The result is A) Fewer days drinking (higher chance of abstinence) and B) Less alcohol  "consumed if/when drinking occurs. This is prescribed 1 pill, 1 time per day. Please tell your provider if you have any liver damage, as this can change our management plan with naltrexone.    Naltrexone blocks the opioid receptor, so pain medications or illicit opioids will NOT provide the expected response. If you have an injury or a surgery, naltrexone will block the pain medications, so you need to talk to your provider to account for this.    Acamprosate (Campral)  Acamprosate provides the same results as naltrexone - A) Fewer days drinking (higher chance of abstinence) and B) Less alcohol consumed if/when drinking occurs. These two medications generally provide a similar chance of success. However, it acts differently in the brain/body. Alcohol directly impacts the activity of a pair of neurotransmitters - MINDY and glutamate. Symptoms of alcohol withdrawal, and post-acute withdrawal, are partly driven by changes in these chemicals. Acamprosate  stabilizes  the way MINDY and glutamate influence each other, largely by reducing the influence of glutamate (the \"excitatory\" factor, balanced by the \"inhibitory\" MINDY). The result is usually subtle but ultimately patients can experience some anxiety relief, less restlessness, and more  leveled out . There is often craving relief as well. This is prescribed 2 pills, 3 times per day. Please tell your doctor if you have any kidney damage, as this can change our management plan with naltrexone.    "

## 2023-07-14 NOTE — PROGRESS NOTES
Virtual Visit Details    Type of service:  Video Visit   Video Start Time: 1319  Video End Time:1347    Originating Location (pt. Location): Other [facility].    Distant Location (provider location):  On-site  Platform used for Video Visit: Blue         Answers for HPI/ROS submitted by the patient on 7/14/2023  If you checked off any problems, how difficult have these problems made it for you to do your work, take care of things at home, or get along with other people?: Not difficult at all  PHQ9 TOTAL SCORE: 1

## 2023-07-14 NOTE — GROUP NOTE
Group Therapy Documentation    PATIENT'S NAME: Alistair Huggins  MRN:   [id number]  :   1980  ACCT. NUMBER: [id number]  DATE OF SERVICE: 23  START TIME: 12:30 PM  END TIME:  2:30 PM  FACILITATOR(S): Haley Greco LADC  TOPIC: BEH Group Therapy  Number of patients attending the group:  10  Group Length:  2 Hours    Group Therapy Type: Recovery strategies    Summary of Group / Topics Discussed:    Recovery Principles, Relationship/socialization, and Leisure explorations/use of leisure time    Group Attendance:  Attended group session    Patient's response to the group topic/interactions:  cooperative with task    Patient appeared to be Attentive and Engaged.        Client specific details: Pt was excused from the first half of group, but participated in a discussion of sober relationships, relapse, 12-step group norms, and honesty.

## 2023-07-15 ENCOUNTER — HOSPITAL ENCOUNTER (OUTPATIENT)
Dept: BEHAVIORAL HEALTH | Facility: CLINIC | Age: 43
Discharge: HOME | End: 2023-07-15
Attending: FAMILY MEDICINE
Payer: COMMERCIAL

## 2023-07-15 LAB
HAV IGM SERPL QL IA: NONREACTIVE
HBV CORE IGM SERPL QL IA: NONREACTIVE
HBV SURFACE AG SERPL QL IA: NONREACTIVE
HCV AB SERPL QL IA: NONREACTIVE

## 2023-07-15 PROCEDURE — H2035 A/D TX PROGRAM, PER HOUR: HCPCS | Mod: HQ

## 2023-07-15 PROCEDURE — 1002N00001 HC LODGING PLUS FACILITY CHARGE ADULT

## 2023-07-15 NOTE — GROUP NOTE
Group Therapy Documentation    PATIENT'S NAME: Alistair Huggins  MRN:   [id number]  :   1980  ACCT. NUMBER: [id number]  DATE OF SERVICE: 7/15/23  START TIME:  9:00 AM  END TIME: 11:00 AM  FACILITATOR(S): Sammi Juarez LADC; Shanda Herrera LADC; Jasper Diop LADC  TOPIC: BEH Group Therapy  Number of patients attending the group:  21  Group Length:  2 Hours    Group Therapy Type: Recovery strategies    Summary of Group / Topics Discussed:    Relationship/socialization and Emotions/expression      Group Attendance:  Attended group session    Patient's response to the group topic/interactions:  cooperative with task    Patient appeared to be Attentive and Engaged.        Client specific details: Myrna was an active participant in weekend workshop on Healthy Relationships.

## 2023-07-15 NOTE — GROUP NOTE
Group Therapy Documentation    PATIENT'S NAME: Alistair Huggins  MRN:   [id number]  :   1980  ACCT. NUMBER: [id number]  DATE OF SERVICE: 7/15/23  START TIME: 12:30 PM  END TIME:  2:30 PM  FACILITATOR(S): Sammi Juarez LADC; Jasper Diop LADC; Shanda Herrera LADC  TOPIC: BEH Group Therapy  Number of patients attending the group:  21  Group Length:  2 Hours    Group Therapy Type: Recovery strategies    Summary of Group / Topics Discussed:    Relapse prevention      Group Attendance:  Attended group session    Patient's response to the group topic/interactions:  cooperative with task    Patient appeared to be Attentive and Engaged.        Client specific details: Myrna was an active participant in weekend workshop on relapse prevention strategies, triggers, and warning signs.

## 2023-07-16 ENCOUNTER — HOSPITAL ENCOUNTER (OUTPATIENT)
Dept: BEHAVIORAL HEALTH | Facility: CLINIC | Age: 43
Discharge: HOME | End: 2023-07-16
Attending: FAMILY MEDICINE
Payer: COMMERCIAL

## 2023-07-16 PROCEDURE — H2035 A/D TX PROGRAM, PER HOUR: HCPCS | Mod: HQ

## 2023-07-16 PROCEDURE — 1002N00001 HC LODGING PLUS FACILITY CHARGE ADULT

## 2023-07-16 NOTE — GROUP NOTE
Group Therapy Documentation    PATIENT'S NAME: Alistair Huggins  MRN:   [id number]  :   1980  ACCT. NUMBER: [id number]  DATE OF SERVICE: 23     START TIME: 12:30 PM     END TIME:  1:30 PM     FACILITATOR(S): Sammi Juarez LADC; Shanda Herrera LADC     TOPIC: BEH Group Therapy     Number of patients attending the group:  20     Group Length:  1 Hours     Group Therapy Type: Recovery strategies and Health and wellbeing      Summary of Group / Topics Discussed:     Recovery Principles, Sober coping skills, Relationship/socialization, Emotions/expression, Relapse prevention, and Self-care activities     Group Attendance:  Attended group session       Patient's response to the group topic/interactions:  cooperative with task, expressed understanding of topic, gave appropriate feedback to peers and listened actively.     Patient appeared to be Attentive and Engaged.          Client specific details:  Pt appeared to be engaged in group about relationships and strengthening relationships outside of treatment. Pt asked appropriate questions toward staff and was engaging with peers throughout session.

## 2023-07-16 NOTE — GROUP NOTE
Group Therapy Documentation    PATIENT'S NAME: Alistair Huggins  MRN:   [id number]  :   1980  ACCT. NUMBER: [id number]  DATE OF SERVICE: 23  START TIME:  8:45 AM  END TIME: 10:30 AM  FACILITATOR(S): Sammi Juarez LADC; Eliana Osuna RN; Shanda Herrera LADC  TOPIC: BEH Group Therapy  Number of patients attending the group:  20  Group Length:  2 Hours    Group Therapy Type: Health and wellbeing     Summary of Group / Topics Discussed:    Hepatitis A, B, and C      Group Attendance:  Attended group session    Patient's response to the group topic/interactions:  cooperative with task    Patient appeared to be Attentive and Engaged.        Client specific details: Myrna was an active participant in RN psychoeducational lecture on Hepatitis C.

## 2023-07-17 ENCOUNTER — HOSPITAL ENCOUNTER (OUTPATIENT)
Dept: BEHAVIORAL HEALTH | Facility: CLINIC | Age: 43
Discharge: HOME | End: 2023-07-17
Attending: FAMILY MEDICINE
Payer: COMMERCIAL

## 2023-07-17 PROCEDURE — H2035 A/D TX PROGRAM, PER HOUR: HCPCS | Mod: HQ

## 2023-07-17 PROCEDURE — 1002N00001 HC LODGING PLUS FACILITY CHARGE ADULT

## 2023-07-17 ASSESSMENT — ANXIETY QUESTIONNAIRES
3. WORRYING TOO MUCH ABOUT DIFFERENT THINGS: SEVERAL DAYS
1. FEELING NERVOUS, ANXIOUS, OR ON EDGE: SEVERAL DAYS
5. BEING SO RESTLESS THAT IT IS HARD TO SIT STILL: NOT AT ALL
GAD7 TOTAL SCORE: 2
6. BECOMING EASILY ANNOYED OR IRRITABLE: NOT AT ALL
7. FEELING AFRAID AS IF SOMETHING AWFUL MIGHT HAPPEN: NOT AT ALL
4. TROUBLE RELAXING: NOT AT ALL
2. NOT BEING ABLE TO STOP OR CONTROL WORRYING: NOT AT ALL
GAD7 TOTAL SCORE: 2

## 2023-07-17 ASSESSMENT — PATIENT HEALTH QUESTIONNAIRE - PHQ9: SUM OF ALL RESPONSES TO PHQ QUESTIONS 1-9: 2

## 2023-07-17 NOTE — PROGRESS NOTES
[facility] Weekly Treatment Plan Review      Date span:  7-10-23 To 23     Patient did have any absences during this time period (list absence dates and reason for absence). She was excused from half of afternoon group on both 23 and 23 for appts.    Treatment Plan initiated on: 23.    Dimension1: Acute Intoxication/Withdrawal Potential -   Previous Dimension Ratin  Current Dimension Ratin  Date of Last Use: 23  Any reports of withdrawal symptoms - No    Dimension 2: Biomedical Conditions & Complications -   Previous Dimension Ratin  Current Dimension Ratin  Medical Concerns:  None reported.  Current Medications & Medication Changes:  Current Outpatient Medications   Medication     acetaminophen (TYLENOL) 325 MG tablet     alum & mag hydroxide-simethicone (MAALOX) 200-200-20 MG/5ML SUSP suspension     benzocaine-menthol (CEPACOL) 15-3.6 MG lozenge     folic acid (FOLVITE) 1 MG tablet     guaiFENesin (ROBITUSSIN) 20 mg/mL liquid     ibuprofen (ADVIL/MOTRIN) 200 MG tablet     loratadine (CLARITIN) 10 MG tablet     melatonin 3 MG tablet     multivitamin, therapeutic (THERA-VIT) TABS tablet     naltrexone (DEPADE/REVIA) 50 MG tablet     nicotine (NICORETTE) 4 MG gum     senna-docusate (SENOKOT-S/PERICOLACE) 8.6-50 MG tablet     thiamine (B-1) 100 MG tablet     triamcinolone (KENALOG) 0.1 % external cream     Current Facility-Administered Medications   Medication     [START ON 2023] naltrexone (VIVITROL) injection 380 mg     Facility-Administered Medications Ordered in Other Encounters   Medication     Self Administer Medications: Behavioral Services     Medication Prescriber: See chart.  Taking meds as prescribed? n/a  Medication side effects or concerns:  n/a  Outside medical appointments this week (list provider and reason for visit):  None reported.    Narrative: Pt seems fully functioning and seeks medical attention as needed. She attended lecture given by CHAPARRO  nurse on Hepatitis C on 23.     Dimension 3: Emotional/Behavioral Conditions & Complications -   Previous Dimension Ratin  Current Dimension Ratin  PHQ2:       2023    11:00 AM 3/31/2023     8:06 AM 2023     5:36 PM 3/28/2022     7:01 AM   PHQ-2 (  [employer])   Q1: Little interest or pleasure in doing things 0 0 0 0   Q2: Feeling down, depressed or hopeless 0 0 1 0   PHQ-2 Score 0 0 1 0   Q1: Little interest or pleasure in doing things  Not at all Not at all Not at all   Q2: Feeling down, depressed or hopeless  Not at all Several days Not at all   PHQ-2 Score  0 1 0      GAD2:       2023    11:00 AM 2023    12:36 PM   JESSICA-2   Feeling nervous, anxious, or on edge 1 0   Not being able to stop or control worrying 1 0   JESSICA-2 Total Score 2 0     Mental health diagnosis anxiety; depression  Date of last SIB:  Patient denies history of or current SIB  Date of  last SI:  Patient reports passive SI in the past month  Date of last HI: Patient denies history of or current HI  Behavioral Targets: Follow recommendations of medical provider. Report any alcohol or drug use to counselor and any increase in withdrawal symptoms to nurse. Stabilize and maintain mental health.   Risk factors: The patient lacks relapse prevention, coping, and refusal skills, as well as a sober peer support network. She has dual issues of MI and CD, a tendency to isolate, and a significant history of trauma.   Protective factors:  restricted access to lethal means (firearms), dedication to family/friends, adherence with prescribed medication, agreement to use safety plan and access to a variety of clinical interventions  Current MH Assignments:  none at this time    Narrative: Current Mental Health symptoms include: none reported or observed. See below for suicide risk assessment. Pt does not endorse thoughts of self-harm or suicide ideation at this time. Pt's current CGI is 4/4.  She reports that her stress level has  "decreased; coping skills to manage stress used were \"reading, working out, and writing every day.\" Pt reported that her mood has not significantly changed this past week.    McKenzie Suicide Severity Rating Scale (Short Version)      7/5/2023     7:39 PM 7/6/2023     7:56 AM 7/6/2023     4:00 PM 7/7/2023    10:00 AM 7/12/2023    12:00 PM   McKenzie Suicide Severity Rating (Short Version)   Over the past 2 weeks have you felt down, depressed, or hopeless? yes       Over the past 2 weeks have you had thoughts of killing yourself? yes       Comments the  said on their way here patient said it would be better if she dies it will be easy for everybody.       Have you ever attempted to kill yourself? yes       When did this last happen? between 1 and 6 months ago       Q1 Wished to be Dead (Past Month) yes       Q2 Suicidal Thoughts (Past Month) yes       Q3 Suicidal Thought Method yes       Q4 Suicidal Intent without Specific Plan no       Q5 Suicide Intent with Specific Plan no       Q6 Suicide Behavior (Lifetime) yes       Within the Past 3 Months? yes       Level of Risk per Screen high risk       High Risk Required Interventions  On continuous in person observation      Interventions  Monitored via video      1. Wish to be Dead (Since Last Contact)   N Y N   Wish to be Dead Description (Since Last Contact)    Passive Thoughts    2. Non-Specific Active Suicidal Thoughts (Since Last Contact)   N N N   Actual Attempt (Since Last Contact)   N N N   Has subject engaged in non-suicidal self-injurious behavior? (Since Last Contact)   N N N   Interrupted Attempts (Since Last Contact)   N N N   Aborted or Self-Interrupted Attempt (Since Last Contact)   N N N   Preparatory Acts or Behavior (Since Last Contact)   N N N   Suicide (Since Last Contact)   N N N   Calculated C-SSRS Risk Score (Since Last Contact)   No Risk Indicated Low Risk No Risk Indicated       Dimension 4: Treatment Acceptance / Resistance -   Previous " "Dimension Rating:  3  Current Dimension Rating:  3  MIGUEL ANGEL Diagnosis:  Alcohol Use Disorder   303.90 (F10.20) Severe In a controlled environment  Commitment to tx process/Stage of change- Pt consistently attends and participates in groups and lectures. She appears to be in the contemplative stage of change at this time.  MIGUEL ANGEL assignments - \"Understanding Post Acute Withdrawal Syndrome and Symptom Checklist\"     Narrative - Pt reports her motivation this week is \"my health, my family, and moving forward with my life.\" Pt reports that her aftercare plans are \"in progress.\" She reports that she has gotten along \"100%\" with peers and staff this week.     Dimension 5: Relapse / Continued Problem Potential -   Previous Dimension Ratin  Current Dimension Ratin  Relapses this week - None  Urges to use - yes  UA results - negative for all screened drugs    Narrative- Pt reports craving 3/10, ten being high. She reported not experiencing triggers to use  and used the following coping skill(s): \"excersize, breathing, calling my kids.\" Pt participated in the spirituality group, facilitated by Lorraine Rachel and  counseling staff was present during group.    Dimension 6: Recovery Environment -   Previous Dimension Ratin  Current Dimension Ratin  Family Involvement - n/a  Summarize attendance at family groups and family sessions - n/a  Family supportive of treatment?  Yes  Recreational activities - yoga, acupuncture, going for walks    Narrative - Pt is spending free time with same-gender peers and attending at least 3 virtual 12-step meetings weekly while in . She reports having had contact with her  and children this week. Pt participated in weekend workshop on relationships and completed all activities.    Progress made on transition planning goals: none at this time    Justification for Continued Treatment at this Level of Care: Risk ratings indicate continued need for this level of care. Pt has " been unable to maintain abstinence from alcohol while at the outpatient level of care, lacks long-term sober maintenance skills, lacks sober coping skills and has mental health concerns which are exacerbated by substance use.   Treatment coordination activities this week:  referrals to mental health services including [facility] LM[facility]  Need for peer recovery support referral? No    Discharge Planning:  Target Discharge Date/Timeframe: 8-4-23  Med Mgmt Provider/Appt:  TBD  Ind therapy Provider/Appt:  TBD  Family therapy Provider/Appt:  TBD  Other referrals:  none at this time.    Has vulnerable adult status changed? No    Interdisciplinary Clinical Supervision including: GISSEL, Mental health professional and RN    Are Treatment Plan goals/objectives effective? Yes  *If no, list changes to treatment plan:    Are the current goals meeting client's needs? Yes  *If no, list the changes to treatment plan.    Patient Input / Response: Pt contributed to treatment plan review.    *Client agrees with any changes to the treatment plan: N/A  *Client received copy of changes: N/A  *Client is aware of right to access a treatment plan review: Yes    GISSEL Villegas

## 2023-07-17 NOTE — GROUP NOTE
"Group Therapy Documentation    PATIENT'S NAME: Alistair Huggins  MRN:   [id number]  :   1980  ACCT. NUMBER: [id number]  DATE OF SERVICE: 23  START TIME:  9:00 AM  END TIME: 11:00 AM  FACILITATOR(S): Jv Artis LADC  TOPIC: BEH Group Therapy  Number of patients attending the group:  4  Group Length:  2 Hours    Group Therapy Type: Emotion processing    Summary of Group / Topics Discussed:    A.M. Check-ins      Group Attendance:  Attended group session    Patient's response to the group topic/interactions:  cooperative with task, discussed personal experience with topic and expressed readiness to alter behaviors    Patient appeared to be Actively participating.        Client specific details:  Alistair, participated in a.m. check-in with group discussion on \"Fears\" in long-term recovery, and a activity called \"Until Today\" each patient shared their experience and how this activity related to their issues and recovery. Patients then discussed how to implement their learned coping skills as part of their recovery program. Each patient had an opportunity to process the information, and ask questions about the activity. Facilitator provide constructive feedback with helping them understand how addiction and mental health have affected their life's.       "

## 2023-07-17 NOTE — GROUP NOTE
"Group Therapy Documentation    PATIENT'S NAME: Alistair Huggins  MRN:   [id number]  :   1980  ACCT. NUMBER: [id number]  DATE OF SERVICE: 23  START TIME: 12:30 PM  END TIME:  2:30 PM  FACILITATOR(S): Haley Greco LADC  TOPIC: BEH Group Therapy  Number of patients attending the group:  4  Group Length:  2 Hours    Group Therapy Type: Recovery strategies and Emotion processing    Summary of Group / Topics Discussed:    Recovery Principles, Sober coping skills, and Relapse prevention    Group Attendance:  Attended group session     Patient's response to the group topic/interactions:  cooperative with task    Patient appeared to be Actively participating, Attentive and Engaged.      Client specific details: Pt offered her peers supportive feedback on their \"Relapse Autopsy\" and \"Book of Me\" assignments, and participated in a group discussion of habit-building, positive self-talk, accountability, and goal-setting.  "

## 2023-07-18 ENCOUNTER — HOSPITAL ENCOUNTER (OUTPATIENT)
Dept: BEHAVIORAL HEALTH | Facility: CLINIC | Age: 43
Discharge: HOME | End: 2023-07-18
Attending: FAMILY MEDICINE
Payer: COMMERCIAL

## 2023-07-18 PROCEDURE — 1002N00001 HC LODGING PLUS FACILITY CHARGE ADULT

## 2023-07-18 PROCEDURE — H2035 A/D TX PROGRAM, PER HOUR: HCPCS | Mod: HQ

## 2023-07-18 NOTE — GROUP NOTE
Group Therapy Documentation    PATIENT'S NAME: Alistair Huggins  MRN:   [id number]  :   1980  ACCT. NUMBER: [id number]  DATE OF SERVICE: 23  START TIME:  3:00 PM  END TIME:  4:00 PM  FACILITATOR(S): Ad Mares LADC  TOPIC: BEH Group Therapy  Number of patients attending the group:  5  Group Length:  1 Hours    Group Therapy Type: Health and wellbeing     Summary of Group / Topics Discussed:    Self-care activities      Group Attendance:  Attended group session    Patient's response to the group topic/interactions:  cooperative with task    Patient appeared to be Actively participating.        Client specific details:  Myrna participated and interacted appropriately with peers and staff in skills group. No triggers to use noted or discussed.

## 2023-07-18 NOTE — GROUP NOTE
"Group Therapy Documentation    PATIENT'S NAME: Alistair Huggins  MRN:   [id number]  :   1980  ACCT. NUMBER: [id number]  DATE OF SERVICE: 23  START TIME:  9:00 AM  END TIME: 11:00 AM  FACILITATOR(S): Lia Metz LADC  TOPIC: BEH Group Therapy  Number of patients attending the group:    Group Length:  2 Hours    Group Therapy Type: Recovery strategies    Summary of Group / Topics Discussed:    Recovery Principles    Patients completed daily check ins and the question of the day, \" What are some tools you have used to remain sober?\"     Patients presented assignments regarding motivation and addiction/manageability and shared feedback.     Patients engaged in reading and processing daily meditations.     Patients Discussed:  - internal vs external motivation and recovery   - the role that fear plays in motivation to remain sober.   - goal setting/realistic vs non realistic   - importance of boundary setting for goal completion              Group Attendance:  Attended group session    Patient's response to the group topic/interactions:  cooperative with task    Patient appeared to be Actively participating.        Client specific details:  Myrna  attended AM small group therapy. Patient participated and remained engaged throughout group. Patient shared AM Check in Affirmation and goal for the day. Patient shared her insights on the meditation readings. Patient shared her First Step Assignment with staff and peers and was open to discussing feedback.         "

## 2023-07-18 NOTE — GROUP NOTE
Group Therapy Documentation    PATIENT'S NAME: Alistair Huggins  MRN:   [id number]  :   1980  ACCT. NUMBER: [id number]  DATE OF SERVICE: 23  START TIME: 12:30 PM  END TIME:  2:30 PM  FACILITATOR(S): Sabine Funes LADC  TOPIC: BEH Group Therapy  Number of patients attending the group:  5  Group Length:  2 Hours    Group Therapy Type: Recovery strategies    Summary of Group / Topics Discussed:    Recovery Principles, Spiritual Care, and Self-care activities    Spiritual Group Therapy consisted of Spiritual Care and addressing Principles that are important in recovery, and wellness of self. Patients and facilitators (Valerie & GISSEL)  reviewed topics related to sense of self, identity, and relations to others within spirituality/relision/nonspiritual concepts. Patients gained knowledge on the benefits of engaging in mindfulness in nature as part of spirituality and recovery practices. Each patient had an opportunity to process, and provide constructive feedback to peers who shared.      Group Attendance:  Attended group session    Patient's response to the group topic/interactions:  cooperative with task    Patient appeared to be Actively participating, Attentive and Engaged.        Client specific details:  Patient was engaged and verbally participatory throughout group session, providing appropriate feedback, and sharing personal insight on the practice of mindfulness.

## 2023-07-19 ENCOUNTER — HOSPITAL ENCOUNTER (OUTPATIENT)
Dept: BEHAVIORAL HEALTH | Facility: CLINIC | Age: 43
Discharge: HOME | End: 2023-07-19
Attending: FAMILY MEDICINE
Payer: COMMERCIAL

## 2023-07-19 DIAGNOSIS — F19.20 CHEMICAL DEPENDENCY (H): ICD-10-CM

## 2023-07-19 PROCEDURE — 1002N00001 HC LODGING PLUS FACILITY CHARGE ADULT

## 2023-07-19 PROCEDURE — H2035 A/D TX PROGRAM, PER HOUR: HCPCS | Mod: HQ

## 2023-07-19 NOTE — GROUP NOTE
"Group Therapy Documentation    PATIENT'S NAME: Alistair Huggins  MRN:   [id number]  :   1980  ACCT. NUMBER: [id number]  DATE OF SERVICE: 23  START TIME: 12:30 PM  END TIME:  2:30 PM  FACILITATOR(S): Sabine Funes LADC  TOPIC: BEH Group Therapy  Number of patients attending the group:  7  Group Length:  2 Hours    Group Therapy Type: Recovery strategies    Summary of Group / Topics Discussed:    Recovery Principles, Disease of addiction, and Relapse prevention    Facilitator led group discussion on consequences of use and rewards in recovery. Patients gained knowledge on how to increase and maintain their internal motivation for long-term abstinence by focusing on what they gain by not using and the rewards they gain in recovery. Several patients presented their \"5 vs 5\" assignment they did in AM group, demonstrating insight into how continued use will result in worsening consequences, but maintaining abstinence will result in long-term rewards and improved quality of life. Each patient had an opportunity to process and provide constructive feedback to peers who shared.      Group Attendance:  Attended group session    Patient's response to the group topic/interactions:  cooperative with task    Patient appeared to be Actively participating, Attentive and Engaged.        Client specific details:  Patient engaged throughout group session, providing appropriate feedback and sharing personal insights. Patient demonstrated active listening throughout group, making eye contact with the facilitator and peers, and nodding understanding, but did not verbally participate.       "

## 2023-07-19 NOTE — GROUP NOTE
"Psychoeducation Group Documentation    PATIENT'S NAME: Alistair Huggins  MRN:   [id number]  :   1980  ACCT. NUMBER: [id number]  DATE OF SERVICE: 23  START TIME:  8:30 AM  END TIME:  9:30 AM  FACILITATOR(S): Sabine Funes LADC; Boni Randolph LADC; Janice Hanks LADC  TOPIC: BEH Pyschoeducation  Number of patients attending the group:  27  Group Length:  1 Hours    Skills Group Therapy Type: Recovery skills and Emotion regulation skills    Summary of Group / Topics Discussed:    Balanced lifestyle skills and Relapse prevention skills    Dr. Amada Martinez ( of Psychiatry, [facility]) presented on emotion regulation with an emphasis on \"negative emotions\". Patients gained knowledge on core emotions, and how to recognize when emotions become escalated leading to maladaptive coping strategies and impulsivity. Patients gained insight into how to regulate emotions to manage stress and avert return to use of substances. Each patient had an opportunity to process the information, ask questions of the facilitator, and share constructive feedback.          Group Attendance:  Attended group session    Patient's response to the group topic/interactions:  cooperative with task    Patient appeared to be Attentive and Engaged.         Client specific details:  Patient fully participated in skills lecture, demonstrating active listening skills and providing appropriate feedback.          "

## 2023-07-19 NOTE — GROUP NOTE
"Group Therapy Documentation    PATIENT'S NAME: Alistair Huggins  MRN:   [id number]  :   1980  ACCT. NUMBER: [id number]  DATE OF SERVICE: 23  START TIME:  9:00 AM  END TIME: 11:00 AM  FACILITATOR(S): Lia Metz LADC  TOPIC: BEH Group Therapy  Number of patients attending the group:  7  Group Length:  2 Hours    Group Therapy Type: Recovery strategies    Summary of Group / Topics Discussed:    Recovery Principles, Sober coping skills, Disease of addiction, Emotions/expression, and Self-care activities    Patients completed daily check ins and the question of the day, \" Who is someone you look up to?\"      Patients discussed daily meditation readings addressing being open to change, and the opportunities that present with changing behaviors  Patients shared personal situations of manifesting healthy mindsets, behaviors, and open mindedness.       Patients engaged in a recovery related art therapy activity. Patients were given a large poster paper and asked to create 2 vision boards.  Patients had access to pens, pencils, markers, colored pencils, various types of magazines to pull cut outs from and create their vision boards.   1) what do you imagine your life to look like in 5 years if you stay sober and live a life of recovery.   2) what do you imagine your life to look like in 5 years if you continue use and destructive behaviors.                  Group Attendance:  Attended group session    Patient's response to the group topic/interactions:  cooperative with task    Patient appeared to be Actively participating.        Client specific details:  Myrna  attended AM small group therapy. Patient participated and remained engaged throughout group. Patient shared AM Check in Affirmation and goal for the day. Patient shared her insights on the meditation readings.       "

## 2023-07-20 ENCOUNTER — HOSPITAL ENCOUNTER (OUTPATIENT)
Dept: BEHAVIORAL HEALTH | Facility: CLINIC | Age: 43
Discharge: HOME | End: 2023-07-20
Attending: FAMILY MEDICINE
Payer: COMMERCIAL

## 2023-07-20 PROCEDURE — 1002N00001 HC LODGING PLUS FACILITY CHARGE ADULT

## 2023-07-20 PROCEDURE — H2035 A/D TX PROGRAM, PER HOUR: HCPCS | Mod: HQ

## 2023-07-20 PROCEDURE — H2035 A/D TX PROGRAM, PER HOUR: HCPCS | Mod: 59

## 2023-07-20 NOTE — PROGRESS NOTES
"INDIVIDUAL SESSION SUMMARY    D) Met with client on 7/20/21 from 12:30-1:30pm. Client is in the [facility] program. Client shared that she had several new insights after reading the Adult Child of Alcoholics assignment. Client shared feeling \"relieved\" after her children visited on Sunday and \"freed\" after she had a visit with her S.O. on Tuesday and talked about her aftercare. Client stated that her S.O. agreed to couples counseling and to attend Al-Anon meetings. Client asked for meetings information and couples therapy referrals.     I)    Provided client with verbal interventions including validation, compassion, and support.    Engaged in cognitive restructuring/re-framing by highlighting cognitive distortions and negative thought patterns.     Provided positive reinforcement for progress towards goals, gains in insight, and application of skills.     Provided referrals/resources for couples therapy near [place], Al-Ano meetings and Adult Children of Alcoholic (GRAEME) meeting.     A)     Client appears to be taking steps to advocate for herself and put her needs first.     Client appears to have be using some new skills to regulate impulses and manage stress.     Client appears to understand the importance of having a sober support network.     Client appears to have strong internal motivation and would benefit from continued support with a focus on processing past traumas and losses, stress management, impulse control, relapse prevention, increasing resiliency and self-esteem.     Client tends towards self-defeating, self-sabotaging patterns as demonstrated by over-working, not asking for help and taking on too much.     P) Next session is scheduled for 8/1/23.    Yennifer Scott, ALEJANDRA  7/20/2023    "

## 2023-07-20 NOTE — GROUP NOTE
Group Therapy Documentation    PATIENT'S NAME: Alistair Huggins  MRN:   [id number]  :   1980  ACCT. NUMBER: [id number]  DATE OF SERVICE: 23  START TIME: 12:30 PM  END TIME:  2:30 PM  FACILITATOR(S): Sabine Funes LADC  TOPIC: BEH Group Therapy  Number of patients attending the group:  9  Group Length:  2 Hours    Group Therapy Type: Recovery strategies    Summary of Group / Topics Discussed:    Cognitive behavioral therapy skills, Emotions/expression, and Relapse prevention    Facilitator led group discussion on guilt and shame. Patients gained understanding of the difference between emotions of shame and guilt. Patients discussed how their behaviors during active use violated personal values, gaining insight on how they distorted their guilt into shame, leading to struggles with self-esteem and contributed to their substance use. Facilitator led patients through an activity on self-forgiveness. Patients identified something they did while in active use that they feel shame for, and then discussed how the behavior went against a value. Patients then discussed how they can make amends for the behavior. Each patient had an opportunity to process, and provide constructive feedback to peers who shared.      Group Attendance:  Attended group session and Excused from group session    Patient's response to the group topic/interactions:  cooperative with task    Patient appeared to be Actively participating, Attentive and Engaged.        Client specific details:  Patient was absent during first hour of primary group due to approved psychotherapy appointment. Patient engaged throughout group session, providing appropriate feedback, and sharing personal insight on the topic of shame.

## 2023-07-20 NOTE — GROUP NOTE
"Group Therapy Documentation    PATIENT'S NAME: Alistair Huggins  MRN:   [id number]  :   1980  ACCT. NUMBER: [id number]  DATE OF SERVICE: 23  START TIME:  9:00 AM  END TIME: 11:00 AM  FACILITATOR(S): Lia Metz LADC  TOPIC: BEH Group Therapy  Number of patients attending the group:  9  Group Length:  2 Hours    Group Therapy Type: Recovery strategies    Summary of Group / Topics Discussed:     Recovery Principles and Emotions/expression     Patients completed daily check ins and the question of the day, \" Would you take the chance to go back to age 15, and life your life over and make changes? Why? Or Why Not?\"     Patients presented 5&5 assignments and shared feedback.     Patients engaged in reading and processing daily meditations.         Group Attendance:  Attended group session    Patient's response to the group topic/interactions:  cooperative with task    Patient appeared to be Actively participating.        Client specific details:  Myrna  attended AM small group therapy. Patient participated and remained engaged throughout group. Patient shared AM Check in Affirmation and goal for the day. Patient shared her insights on the meditation readings.       "

## 2023-07-20 NOTE — GROUP NOTE
Group Therapy Documentation    PATIENT'S NAME: Alistair Huggins  MRN:   [id number]  :   1980  ACCT. NUMBER: [id number]  DATE OF SERVICE: 23  START TIME:  3:00 PM  END TIME:  4:00 PM  FACILITATOR(S): Lia Metz LADC; Lana Tucker LADC; Sabine Funes LADC  TOPIC: BEH Group Therapy  Number of patients attending the group:  28   Group Length:  2 Hours    Group Therapy Type: Recovery strategies    Summary of Group / Topics Discussed:    Recovery Principles, Co-occurring illnesses symptom management, and Disease of addiction    Lecture Presenter: Dr. Karlo Santillan-  [facility]  Patients engaged in a 60 min skills group lecture, and were able to ask questions upon the end, and discuss what they gained from the presentation and how it incorporates in their recovery. The presentation focused on the scientific study of individuals struggling with co-occurring morbidities with substance abuse, and the tools and behavior changes that are helpful sustaining long term sobriety.        Group Attendance:  Attended group session    Patient's response to the group topic/interactions:  cooperative with task    Patient appeared to be Actively participating.        Client specific details:  Myrna attended skills group with Dr. Karlo Santillan-  [facility] Patient remained engaged and participated thought the group session.

## 2023-07-21 ENCOUNTER — HOSPITAL ENCOUNTER (OUTPATIENT)
Dept: BEHAVIORAL HEALTH | Facility: CLINIC | Age: 43
Discharge: HOME | End: 2023-07-21
Attending: FAMILY MEDICINE
Payer: COMMERCIAL

## 2023-07-21 ENCOUNTER — VIRTUAL VISIT (OUTPATIENT)
Dept: ADDICTION MEDICINE | Facility: CLINIC | Age: 43
End: 2023-07-21
Payer: COMMERCIAL

## 2023-07-21 DIAGNOSIS — R74.8 ELEVATED LIVER ENZYMES: ICD-10-CM

## 2023-07-21 DIAGNOSIS — F10.20 ALCOHOL USE DISORDER, SEVERE, DEPENDENCE (H): ICD-10-CM

## 2023-07-21 DIAGNOSIS — Z23 NEED FOR HEPATITIS B VACCINATION: ICD-10-CM

## 2023-07-21 PROCEDURE — 1002N00001 HC LODGING PLUS FACILITY CHARGE ADULT

## 2023-07-21 PROCEDURE — H2035 A/D TX PROGRAM, PER HOUR: HCPCS | Mod: HQ

## 2023-07-21 PROCEDURE — 99214 OFFICE O/P EST MOD 30 MIN: CPT | Mod: VID

## 2023-07-21 ASSESSMENT — PAIN SCALES - GENERAL: PAINLEVEL: NO PAIN (0)

## 2023-07-21 NOTE — GROUP NOTE
Group Therapy Documentation    PATIENT'S NAME: Alistair Huggins  MRN:   [id number]  :   1980  ACCT. NUMBER: [id number]  DATE OF SERVICE: 23  START TIME: 12:30 PM  END TIME:  2:30 PM  FACILITATOR(S): Haley Greco LADC; Sabine Funes LADC; Jv Artis LADC  TOPIC: BEH Group Therapy  Number of patients attending the group:  27  Group Length:  2 Hours    Group Therapy Type: Recovery strategies    Summary of Group / Topics Discussed:    Relationship/socialization, Emotions/expression, and Leisure explorations/use of leisure time    Group Attendance:  Attended group session    Patient's response to the group topic/interactions:  cooperative with task    Patient appeared to be Attentive and Engaged.        Client specific details: Pt watched a recovery-related film with peers and participated in subsequent discussion.

## 2023-07-21 NOTE — PATIENT INSTRUCTIONS
Patient Education   Addiction Medicine  What to Expect  Here's what to expect from our Addiction Medicine program.  About Addiction Medicine  Addiction Medicine clinics help you with substance use problems. You set your own goals. We try to help you reach your goals. Your care plan can include:  Medicine  Creating a recovery plan  Helping you find local resources  Helping with treatment options  Clinic phone number and addresses  Clinic Phone: [phone]  Mental Health and Addiction Clinic  [facility]  [address], [address]  [place] [address]  [facility]  [address], [address]  [place] [address]  Walk-in services  We offer walk-in care for patients at the Recovery Clinic. This is only for patients with Opioid Use Disorder (OUD). Anyone with OUD is welcome. Our providers will refer you to the Recovery Clinic if you're struggling to keep up with your medicines or appointments.  Recovery Clinic ([facility])  [address], [address]  [place] [address]  Phone: [phone]  The Recovery Clinic is open for walk-ins Monday to Friday 9 a.m. to 11:30 a.m. and 12:30 p.m. to 3 p.m.  How it works  Come to your visits every time. The treatment works better when you do.   You can have as many visits as you need. When you're better, we'll refer you back to being cared for by your family doctor.   If you need it, we'll send you to doctors, psychiatrists, therapists, and other providers. We focus on treating addiction. We don't treat other problems, like managing other medicines or non-addiction issues.  About visits  Urine drug testing  We'll often test your pee (urine) for drugs. This is the only way we can know for sure whether or not you're using drugs. It helps us treat you without judgement.   Suboxone (buprenorphine)  If you're taking buprenorphine, you'll have a lot of visits at first. If your problem is getting worse, or you're  "using substances, we may schedule you for extra visits.   Cancelling visits  If you can't come to your visit, please call us right away at [phone]. If you don't cancel at least 24 hours (1 full day) before your visit, that's \"late cancellation.\"  Being late to visits  If you come late, you may not be seen. This will count as a \"no-show.\"  Please call the clinic if you're running late. This will help us plan, but it doesn't mean you'll be seen.   Being late is:  More than 15 minutes late for a return visit.  More than 30 minutes late for your first visit.  If you cancel late or don't show up 2 times within 6 months, we may transfer you to another clinic.   Getting help between visits  If you need help between visits, you can call us Monday to Friday from 8 a.m. to 4:30 p.m. at [phone]. You can also send us a message on [employer].  Medicine refills  If you miss or cancel a visit, you can still ask for a refill. But we can only refill your medicines if you've made a new appointment.  Please call your pharmacy for medicine refills. If you have a question about your refill, call us at [phone].  It takes up to 2 business days to refill your drugs. Let us know 2 to 3 days before you run out. Don't call more than 1 week before you run out. That's too early.   Please make sure we have your right phone number.  If we have a problem with your refill, we'll call you. If we call you, please call us back right away. If you don't, you may not get your medicines quickly.   Call your pharmacy to find out if your medicines are ready.   Keep your medicines in a safe place. Keep them away from pets and children. If your medicines are lost or stolen, we usually don't replace them. We recommend you file a police report if your medicines are stolen. Your insurance may not pay for early refills, even if you have a prescription.  Forms  Please give us at least 3 business days to fill out any forms. Bring the forms to your " visits if you can. We may refer you to other members of your care team to complete the forms.   Emergency care   Call 911 or go to the nearest emergency room if your life or someone else's life is in danger.  Call 988 anytime for the Suicide and Crisis Lifeline.  If you need care when we're closed, call your family doctor to see if they can help. You can also go to urgent care or an emergency room. [facility] emergency rooms may be able to give you buprenorphine or other medicine refills.  Thank you for choosing us for your care.  For informational purposes only. Not to replace the advice of your health care provider. Copyright   2023 [facility]. All rights reserved. [email or web address] [id number] - REV 05/23.

## 2023-07-21 NOTE — GROUP NOTE
"Group Therapy Documentation    PATIENT'S NAME: Alistair Huggins  MRN:   [id number]  :   1980  ACCT. NUMBER: [id number]  DATE OF SERVICE: 23  START TIME:  9:00 AM  END TIME: 11:00 AM  FACILITATOR(S): Lia Metz LADC  TOPIC: BEH Group Therapy  Number of patients attending the group:  9  Group Length:  2 Hours    Group Therapy Type: Recovery strategies    Summary of Group / Topics Discussed:    Recovery Principles, Disease of addiction, and Relapse Prevention       Patients completed daily check ins and the question of the day, \" What is a goal you want to accomplish in the next 6 months?\"    Patients presented assignments relating to GRAEME, Negative Self Talk, First Step and shared feedback.     Patients engaged in reading and processing daily meditations.        Group Attendance:  Attended group session    Patient's response to the group topic/interactions:  cooperative with task    Patient appeared to be Actively participating.        Client specific details:  Myrna  attended AM small group therapy. Patient participated and remained engaged throughout group. Patient shared AM Check in Affirmation and goal for the day. Patient shared her insights on the meditation readings.    Patient shared her \"Survival (GRAEME) assignment in group. Patient became emotional while sharing her relating to the article and her life growing up in a home with alcoholic/addict parents. Patient reported gaining a lot of insight from the insight and learned about GRAEME.         "

## 2023-07-21 NOTE — NURSING NOTE
Is the patient currently in the state of [place]? YES    Visit mode:VIDEO    If the visit is dropped, the patient can be reconnected by: VIDEO VISIT: Text to cell phone: [phone]    Will anyone else be joining the visit? NO      How would you like to obtain your AVS? MyChart    Are changes needed to the allergy or medication list? NO    Reason for visit: RECHECK

## 2023-07-21 NOTE — PROGRESS NOTES
Virtual Visit Details    Type of service:  Video Visit   Video Start Time: 1508  Video End Time:1520    Originating Location (pt. Location): Other [facility]    Distant Location (provider location):  On-site  Platform used for Video Visit: [facility]    A/P                                                    ASSESSMENT/PLAN    1. Alcohol use disorder, Severe  2. Alcohol abuse  - naltrexone (DEPADE/REVIA) 50 MG tablet; Take 1 tablet (50 mg) by mouth daily  Dispense: 30 tablet; Refill: 0.  Risk/ benefit discussion regarding low potential of increasing liver function labs. Will monitor LFT's with ongoing use of Naltrexone.   -Tolerating oral naltrexone. Interested in initiating Vivitrol  -Continue with [facility] and follow ongoing recommendations  -Obtain sponsor,   -Continue to abstain from alcohol   -Follow up in one month virtual and monthly vivitrol injections        3. Elevated liver enzymes  - Several month period of binge drinking    - Hepatic panel (Albumin, ALT, AST, Bili, Alk Phos, TP); Future  - Acute hepatitis panel; Future  - complete Abstainance from all alcohol use.   -Monitor LFT's    4. Need for Hepatitis B vaccination  -Will follow up with PCP 8/4/2023 Jul 21, 2023  - Start vivitrol, first injection to be scheduled by [facility]  -Continue to follow with addiction medicine virtual visits, with vivitrol injections at [place]      Last encounter A/P From 7/14/2023     ASSESSMENT/PLAN:  1. Alcohol use disorder, Severe  2. Alcohol abuse  - naltrexone (DEPADE/REVIA) 50 MG tablet; Take 1 tablet (50 mg) by mouth daily  Dispense: 30 tablet; Refill: 0.  Risk/ benefit discussion regarding low potential of increasing liver function labs. Will monitor LFT's with ongoing use of Naltrexone.   -Myrna interested in Vivatrol.  Will start oral Naltrexone and  Monitor for side effects. Will start PA process.   -Continue with [facility]  "treatment and follow ongoing recommendations  -Continue to abstain from alcohol         3. Elevated liver enzymes  - Several month period of binge drinking    - Hepatic panel (Albumin, ALT, AST, Bili, Alk Phos, TP); Future  - Acute hepatitis panel; Future  -Abstain from all alcohol use.   -Monitor LFT's      PDMP Review       Value Time User    State PDMP site checked  Yes 7/14/2023  1:15 PM Regina Joyner NP            RTC  Return in about 1 month (around 8/21/2023) for using a video visit.      Counseled the patient on the importance of having a recovery program in addition to medication to manage recovery.  Components include avoiding isolating, having willingness to change, avoiding triggers and managing cravings. Encouraged having some type of sober network and practicing honesty with trusted support person(s). Encouraged other services such as counseling, 12 step or other self-help organizations.            ZEHRA Huggins is a 43 year old female who presents to clinic today for follow up    Visit performed In Person, virtual      MIGUEL ANGEL History:  Substance Use History:   \"Have you ever had any history with [...] use?\" And \"When was your last use?    ALCOHOL - last use July 3 or 4, detox. Typical pattern escalating since separationl. 5 drinks every evening for several months.     CANNABIS - no    PRESCRIPTION STIMULANTS (includes Ritalin, Adderall, Vyvanse) - no    COCAINE/CRACK - no    METH/AMPHETAMINES (includes ecstacy, MDMA/wilfrid) - no    OPIATES - no    BENZODIAZEPINES (includes Ativan, Klonopin, Xanax) - no    KRATOM (mild opioid and stimulant effects) - no    KETAMINE - no    HALLUCINOGENS (includes DXM) - no    BEHAVIORAL (Gambling, Eating d/o, Compulsivity) - no  ? History of treatment - no    NICOTINE  ? Cigarettes: no  ? Chew/snus: no  ? Vaping: no  ? Past NRT/medication use: nicorette 4 mg        Previous withdrawal treatment " "episodes (e.g. detox): no    Previous MIGUEL ANGEL treatment programs: no    Hospitalizations or overdose: no    Medical complications from substance use: Elevated LFT's    IV Drug use?: no    Previous Medication for Addiction Tx: no    Longest period of full abstinence: off and on months, longest sustained 1 year, and pregnancy    Activities that have previously supported abstinence: \"just didn't use\"    Current Recovery Activities: treatment at [facility], outpatient counseling, therapy, and aa     Recent HPI Details:  From 7/14/2023 visit     Alistair Huggins is a 43 year old female who presents for  initial visit for addiction consultation and management referred by [facility] due to concerns for Alcohol Use Disorder (AUD). Myrna started going through a separation in March/April and reports that her alcohol use escalated to daily  Use of 4-5 glasses of spirits nightly. She recognized it being an issue and sought admission and treatment through detox. She's currently      HPI:   Alistair Huggins is a 43 year old female with history of alcohol use, and elevated Liver function tests use who presents for further evaluation of possible substance use disorder and management options. Myrna is currently in treatment at [facility] set to be discharged August 4.  She plans on continuing with outpatient treatment, therapy and AA.         TODAY'S VISIT  HPI Jul 21, 2023  Met with Myrna for follow up visit for pharmacotherapy management of alcohol use disorder. Has been taking naltrexone for one week, no side effects, no cravings.  (But difficult to know cravings in current treatment setting). Feeling positive and hopeful with recovery process.  Is set to graduate August 3 or 4th. Myrna is working on establishing a therapist for post treatment, she's working on getting a sponsor, planning on continuing with 12 step recovery work, will go to [facility] with  and attend marriage counseling.  Would like to start vivitrol " while at [facility].  Will continue to follow up with addiction medicine virtually with vivitrol injections at the [facility].       OBJECTIVE  PHYSICAL EXAM:  LMP 06/21/2023     GENERAL: healthy, alert and no distress  EYES: Eyes grossly normal to inspection, PERRL and conjunctivae and sclerae normal  RESP: No respiratory distress  MENTAL STATUS EXAM  Appearance/Behavior: No appearant distress  Speech: Normal  Mood/Affect: normal affect  Insight: Adequate      PHQ-9 Score:       7/7/2023    10:00 AM 7/14/2023    12:35 PM 7/17/2023     9:00 AM   PHQ   PHQ-9 Total Score 8 1 2   Q9: Thoughts of better off dead/self-harm past 2 weeks Several days Not at all Not at all       JESSICA-7 Score:      3/31/2023     8:01 AM 7/7/2023    10:00 AM 7/17/2023     9:00 AM   JESSICA-7 SCORE   Total Score 2 (minimal anxiety)     Total Score 2 6 2       LABS (may not contain today's labs)                                                        Today's lab data  No results found for any visits on 07/21/23.        HISTORY                                                    Problem list reviewed & adjusted, as indicated.  Patient Active Problem List   Diagnosis     Chemical dependency (H)         MEDICATION LIST (prior to visit)  acetaminophen (TYLENOL) 325 MG tablet, Take 325-650 mg by mouth every 4 hours as needed for mild pain  alum & mag hydroxide-simethicone (MAALOX) 200-200-20 MG/5ML SUSP suspension, Take 30 mLs by mouth every 6 hours as needed for indigestion  benzocaine-menthol (CEPACOL) 15-3.6 MG lozenge, Place 1 lozenge inside cheek every 2 hours as needed for sore throat  folic acid (FOLVITE) 1 MG tablet, Take 1 mg by mouth daily  guaiFENesin (ROBITUSSIN) 20 mg/mL liquid, Take 200 mg by mouth every 4 hours as needed for cough  ibuprofen (ADVIL/MOTRIN) 200 MG tablet, Take 200-400 mg by mouth every 6 hours as needed for pain  loratadine (CLARITIN) 10 MG tablet, Take 10 mg by mouth daily as needed for allergies  melatonin 3 MG  tablet, Take 3 mg by mouth nightly as needed for sleep  multivitamin, therapeutic (THERA-VIT) TABS tablet, Take 1 tablet by mouth daily  naltrexone (DEPADE/REVIA) 50 MG tablet, Take 1 tablet (50 mg) by mouth daily  nicotine (NICORETTE) 4 MG gum, Place 1 each (4 mg) inside cheek every hour as needed for smoking cessation  senna-docusate (SENOKOT-S/PERICOLACE) 8.6-50 MG tablet, Take 2 tablets by mouth daily as needed for constipation  thiamine (B-1) 100 MG tablet, Take 100 mg by mouth daily  triamcinolone (KENALOG) 0.1 % external cream, Apply topically 2 times daily Apply to affected area    [START ON 7/28/2023] naltrexone (VIVITROL) injection 380 mg  Self Administer Medications: Behavioral Services        MEDICATION LIST (after visit)  Current Outpatient Medications   Medication     acetaminophen (TYLENOL) 325 MG tablet     alum & mag hydroxide-simethicone (MAALOX) 200-200-20 MG/5ML SUSP suspension     benzocaine-menthol (CEPACOL) 15-3.6 MG lozenge     folic acid (FOLVITE) 1 MG tablet     guaiFENesin (ROBITUSSIN) 20 mg/mL liquid     ibuprofen (ADVIL/MOTRIN) 200 MG tablet     loratadine (CLARITIN) 10 MG tablet     melatonin 3 MG tablet     multivitamin, therapeutic (THERA-VIT) TABS tablet     naltrexone (DEPADE/REVIA) 50 MG tablet     nicotine (NICORETTE) 4 MG gum     senna-docusate (SENOKOT-S/PERICOLACE) 8.6-50 MG tablet     thiamine (B-1) 100 MG tablet     triamcinolone (KENALOG) 0.1 % external cream     Current Facility-Administered Medications   Medication     [START ON 7/28/2023] naltrexone (VIVITROL) injection 380 mg     Facility-Administered Medications Ordered in Other Visits   Medication     Self Administer Medications: Behavioral Services         No Known Allergies      Regina Joyner NP  [facility] [place] [facility]  [phone]

## 2023-07-22 ENCOUNTER — HOSPITAL ENCOUNTER (OUTPATIENT)
Dept: BEHAVIORAL HEALTH | Facility: CLINIC | Age: 43
Discharge: HOME | End: 2023-07-22
Attending: FAMILY MEDICINE
Payer: COMMERCIAL

## 2023-07-22 PROCEDURE — 1002N00001 HC LODGING PLUS FACILITY CHARGE ADULT

## 2023-07-22 PROCEDURE — H2035 A/D TX PROGRAM, PER HOUR: HCPCS | Mod: HQ

## 2023-07-22 NOTE — GROUP NOTE
Group Therapy Documentation    PATIENT'S NAME: Alistair Huggins  MRN:   [id number]  :   1980  ACCT. NUMBER: [id number]  DATE OF SERVICE: 23  START TIME:  9:00 AM  END TIME: 11:00 AM  FACILITATOR(S): Sabine Funes LADC; Janice Hanks LADC; Kal Tnag LADC  TOPIC: BEH Group Therapy  Number of patients attending the group:  29  Group Length:  2 Hours    Group Therapy Type: Emotion processing, Daily living/independence skills, and Health and wellbeing     Summary of Group / Topics Discussed:    Relationship/socialization and Emotions/expression      Group included a personality test activity, followed by a related discussion.        Group Attendance:  Attended group session    Patient's response to the group topic/interactions:  cooperative with task    Patient appeared to be Actively participating, Attentive and Engaged.        Client specific details: Patient actively engaged in group activity/discussion.

## 2023-07-22 NOTE — GROUP NOTE
Group Therapy Documentation    PATIENT'S NAME: Alistair Huggins  MRN:   [id number]  :   1980  ACCT. NUMBER: [id number]  DATE OF SERVICE: 23  START TIME: 12:30 PM  END TIME:  2:30 PM  FACILITATOR(S): Sabine Funes LADC; Janice Hanks LADC; Kal Tang LADC  TOPIC: BEH Group Therapy  Number of patients attending the group:  30  Group Length:  2 Hours    Group Therapy Type: Recovery strategies    Summary of Group / Topics Discussed:    Sober coping skills, Disease of addiction, and Relapse prevention    Patients participated in a relapse prevention skills workshop, gaining knowledge on how their behaviors in active use can show up as thought patterns in early recovery. Patients learned how to identify their addictive thought patterns and practiced coping strategies to reframe these thoughts to avoid returning to use of substances. Each patient had an opportunity to process the information, ask questions of the facilitator, and provide constructive feedback to peers who shared.      Group Attendance:  Attended group session    Patient's response to the group topic/interactions:  cooperative with task    Patient appeared to be Attentive and Engaged.        Client specific details:  Patient participated in relapse prevention workshop, demonstrating active listening skills, engaging in the skills activity, and providing appropriate feedback to peers who shared. Patient shared personal insights on the topic of addictive thought patterns.

## 2023-07-23 ENCOUNTER — HOSPITAL ENCOUNTER (OUTPATIENT)
Dept: BEHAVIORAL HEALTH | Facility: CLINIC | Age: 43
Discharge: HOME | End: 2023-07-23
Attending: FAMILY MEDICINE
Payer: COMMERCIAL

## 2023-07-23 PROCEDURE — H2035 A/D TX PROGRAM, PER HOUR: HCPCS | Mod: HQ

## 2023-07-23 PROCEDURE — 1002N00001 HC LODGING PLUS FACILITY CHARGE ADULT

## 2023-07-23 NOTE — GROUP NOTE
Group Therapy Documentation    PATIENT'S NAME: Alistair Huggins  MRN:   [id number]  :   1980  ACCT. NUMBER: [id number]  DATE OF SERVICE: 23  START TIME: 12:30 PM  END TIME:  1:30 PM  FACILITATOR(S): Janice Hanks LADC  TOPIC: BEH Group Therapy  Number of patients attending the group:  30    Group Length:  1 Hours    Group Therapy Type: Recovery strategies, Emotion processing, and Daily living/independence skills    Summary of Group / Topics Discussed:    Mindfulness/Relaxation and Relapse prevention      Group Attendance:  Attended group session    Patient's response to the group topic/interactions:  cooperative with task    Patient appeared to be Actively participating, Attentive and Engaged.        Client specific details:  Patient attended a growth mindset lecture and was attentive and participative.

## 2023-07-23 NOTE — GROUP NOTE
Group Therapy Documentation    PATIENT'S NAME: Alistair Huggins  MRN:   [id number]  :   1980  ACCT. NUMBER: [id number]  DATE OF SERVICE: 23  START TIME:  8:45 AM  END TIME: 10:30 AM  FACILITATOR(S): Rosita Solorio RN; Janice Hanks LADC; Kal Tang [facility]  TOPIC: BEH Group Therapy  Number of patients attending the group:  29  Group Length:  2 Hours    Group Therapy Type: Daily living/independence skills and Health and wellbeing     Summary of Group / Topics Discussed:    Self-care activities and Nutrition      Group lecture regarding diet and nutrition was facilitated by nursing staff. Feedback was provided by participants throughout group activity.       Group Attendance:  Attended group session    Patient's response to the group topic/interactions:  cooperative with task    Patient appeared to be Actively participating, Attentive and Engaged.        Client specific details: Patient actively engaged in group discussion/activity.

## 2023-07-24 ENCOUNTER — HOSPITAL ENCOUNTER (OUTPATIENT)
Dept: BEHAVIORAL HEALTH | Facility: CLINIC | Age: 43
Discharge: HOME | End: 2023-07-24
Attending: FAMILY MEDICINE
Payer: COMMERCIAL

## 2023-07-24 ENCOUNTER — TELEPHONE (OUTPATIENT)
Dept: BEHAVIORAL HEALTH | Facility: CLINIC | Age: 43
End: 2023-07-24
Payer: COMMERCIAL

## 2023-07-24 DIAGNOSIS — F10.20 ALCOHOL USE DISORDER, SEVERE, DEPENDENCE (H): Primary | ICD-10-CM

## 2023-07-24 PROCEDURE — H2035 A/D TX PROGRAM, PER HOUR: HCPCS | Mod: HQ

## 2023-07-24 PROCEDURE — 1002N00001 HC LODGING PLUS FACILITY CHARGE ADULT

## 2023-07-24 RX ORDER — DIPHENHYDRAMINE HYDROCHLORIDE 50 MG/ML
50 INJECTION INTRAMUSCULAR; INTRAVENOUS
Status: CANCELLED
Start: 2023-07-24

## 2023-07-24 RX ORDER — ALBUTEROL SULFATE 90 UG/1
1-2 AEROSOL, METERED RESPIRATORY (INHALATION)
Status: CANCELLED
Start: 2023-07-24

## 2023-07-24 RX ORDER — EPINEPHRINE 1 MG/ML
0.3 INJECTION, SOLUTION, CONCENTRATE INTRAVENOUS EVERY 5 MIN PRN
Status: CANCELLED | OUTPATIENT
Start: 2023-07-24

## 2023-07-24 RX ORDER — HEPARIN SODIUM,PORCINE 10 UNIT/ML
5-20 VIAL (ML) INTRAVENOUS DAILY PRN
Status: CANCELLED | OUTPATIENT
Start: 2023-07-24

## 2023-07-24 RX ORDER — HEPARIN SODIUM (PORCINE) LOCK FLUSH IV SOLN 100 UNIT/ML 100 UNIT/ML
5 SOLUTION INTRAVENOUS
Status: CANCELLED | OUTPATIENT
Start: 2023-07-24

## 2023-07-24 RX ORDER — METHYLPREDNISOLONE SODIUM SUCCINATE 125 MG/2ML
125 INJECTION, POWDER, LYOPHILIZED, FOR SOLUTION INTRAMUSCULAR; INTRAVENOUS
Status: CANCELLED
Start: 2023-07-24

## 2023-07-24 RX ORDER — ALBUTEROL SULFATE 0.83 MG/ML
2.5 SOLUTION RESPIRATORY (INHALATION)
Status: CANCELLED | OUTPATIENT
Start: 2023-07-24

## 2023-07-24 NOTE — GROUP NOTE
Group Therapy Documentation    PATIENT'S NAME: Alistair Huggnis  MRN:   [id number]  :   1980  ACCT. NUMBER: [id number]  DATE OF SERVICE: 23  START TIME:  9:00 AM  END TIME: 11:00 AM  FACILITATOR(S): Sabine Funes LADC  TOPIC: BEH Group Therapy  Number of patients attending the group:  8  Group Length:  2 Hours    Group Therapy Type: Recovery strategies    Summary of Group / Topics Discussed:    Sober coping skills, Emotions/expression, and Self-care activities    Patients checked in to primary group by describing current mood, stating an affirmation and gratitude, and sharing their goal for today. Several patients processed current challenges they are experiencing. Facilitator led group discussion on interpersonal communication and boundaries with families. Patients processed current challenges they have with concerned persons in early recovery and demonstrated insight on how lack of boundaries and communication struggles has lead to return to use of substances. Each patient had an opportunity to process and provide constructive feedback to peers who shared.      Group Attendance:  Attended group session    Patient's response to the group topic/interactions:  cooperative with task    Patient appeared to be Actively participating, Attentive, and Engaged.        Client specific details:  Patient fully engaged in group discussion, providing appropriate feedback, and sharing personal experiences with interpersonal communication. Patient processed emotions of contentment and gratitude regarding her father, who came from [place] to visit her yesterday.

## 2023-07-24 NOTE — TELEPHONE ENCOUNTER
"Greetings-  Regarding Myrna, although there is already a Vivitrol order within Epic, scheduling is requesting a \"Vivitrol Plan\" to be placed in Epic.   Without this, they are \"unable to move forward.\" Thank you!  "

## 2023-07-24 NOTE — GROUP NOTE
"Group Therapy Documentation    PATIENT'S NAME: Alistair Huggins  MRN:   [id number]  :   1980  ACCT. NUMBER: [id number]  DATE OF SERVICE: 23  START TIME: 12:30 PM  END TIME:  2:30 PM  FACILITATOR(S): Haley Greco LADC  TOPIC: BEH Group Therapy  Number of patients attending the group:  8  Group Length:  2 Hours    Group Therapy Type: Recovery strategies and Emotion processing    Summary of Group / Topics Discussed:    Relationship/socialization, Emotions/expression, and Relapse prevention    Group Attendance:  Attended group session    Patient's response to the group topic/interactions:  cooperative with task    Patient appeared to be Actively participating, Attentive, and Engaged.        Client specific details: Pt offered her peers supportive feedback on their \"Addictive Love,\" \"Motivation,\" and \"Denial\" assignments and participated in a group discussion of sober maintenance, boundaries, and abuse.  "

## 2023-07-25 ENCOUNTER — HOSPITAL ENCOUNTER (OUTPATIENT)
Dept: BEHAVIORAL HEALTH | Facility: CLINIC | Age: 43
Discharge: HOME | End: 2023-07-25
Attending: FAMILY MEDICINE
Payer: COMMERCIAL

## 2023-07-25 PROCEDURE — 1002N00001 HC LODGING PLUS FACILITY CHARGE ADULT

## 2023-07-25 PROCEDURE — H2035 A/D TX PROGRAM, PER HOUR: HCPCS | Mod: HQ

## 2023-07-25 NOTE — GROUP NOTE
Group Therapy Documentation    PATIENT'S NAME: Alistair Huggins  MRN:   [id number]  :   1980  ACCT. NUMBER: [id number]  DATE OF SERVICE: 23  START TIME:  3:00 PM  END TIME:  4:00 PM  FACILITATOR(S): Sabine Funes LADC; Janice Hanks LADC; Kal Tang LADC  TOPIC: BEH Group Therapy    Patients engaged in a team building activity where they were split into small groups and tasked with building a tower out of dry spaghetti noodles. Patients gained knowledge on the importance of teamwork and developed insight on how working with others can create better results than doing a task alone. Patients discussed how teamwork can help their recovery. Each patient had an opportunity to process and provide constructive feedback to peers who shared.    Group Attendance:  Attended group session    Patient's response to the group topic/interactions:  cooperative with task    Patient appeared to be Actively participating, Attentive, and Engaged.        Client specific details:  Patient fully engaged in team activity, providing appropriate feedback, and sharing personal insights on the importance of community in recovery.

## 2023-07-25 NOTE — GROUP NOTE
Group Therapy Documentation    PATIENT'S NAME: Alistair Huggins  MRN:   [id number]  :   1980  ACCT. NUMBER: [id number]  DATE OF SERVICE: 23  START TIME:  9:00 AM  END TIME: 11:00 AM  FACILITATOR(S):  Lia Metz LADC  TOPIC: BEH Group Therapy  Number of patients attending the group:  8  Group Length:  2 Hours    Group Therapy Type: Recovery strategies and Daily living/independence skills    Summary of Group / Topics Discussed:    Recovery Principles, Sober coping skills, Leisure explorations/use of leisure time, Relapse prevention, Self-care activities, and Support Network Building Tools.      Group Attendance:  Attended group session    Patient's response to the group topic/interactions:  cooperative with task    Patient appeared to be Actively participating.        Client specific details:  Myrna  attended AM small group therapy. Patient participated and remained engaged throughout group. Patient shared AM Check in Affirmation and goal for the day. Patient shared her insights on the meditation readings. Patient engaged in art activity in buildling tools for staying connected to her support network.

## 2023-07-25 NOTE — GROUP NOTE
Group Therapy Documentation    PATIENT'S NAME: Alistair Huggins  MRN:   [id number]  :   1980  ACCT. NUMBER: [id number]  DATE OF SERVICE: 23  START TIME: 12:30 PM  END TIME:  2:30 PM  FACILITATOR(S): Sabine Funes LADC  TOPIC: BEH Group Therapy  Number of patients attending the group:  8  Group Length:  2 Hours    Group Therapy Type: Recovery strategies    Summary of Group / Topics Discussed:    Recovery Principles, Spiritual Care, and Self-care activities    Spiritual Group Therapy consisted of Spiritual Care and addressing Principles that are important in recovery, and wellness of self. Patients and facilitators (Valerie & GISSEL)  reviewed topics related to sense of self, identity, and relations to others within spirituality/Alevism/nonspiritual concepts. Patients gained insight into how spirituality can benefit their recovery goals and provide more dora in their early sobriety. Each patient had an opportunity to process and provide constructive feedback to peers who shared.      Group Attendance:  Attended group session    Patient's response to the group topic/interactions:  cooperative with task    Patient appeared to be Actively participating, Attentive, and Engaged.        Client specific details:  Patient fully engaged in group programming, providing appropriate feedback, and sharing personal insights on the topic of spirituality.

## 2023-07-26 ENCOUNTER — HOSPITAL ENCOUNTER (OUTPATIENT)
Dept: BEHAVIORAL HEALTH | Facility: CLINIC | Age: 43
Discharge: HOME | End: 2023-07-26
Attending: FAMILY MEDICINE
Payer: COMMERCIAL

## 2023-07-26 PROCEDURE — 1002N00001 HC LODGING PLUS FACILITY CHARGE ADULT

## 2023-07-26 PROCEDURE — H2035 A/D TX PROGRAM, PER HOUR: HCPCS | Mod: HQ

## 2023-07-26 NOTE — GROUP NOTE
Psychoeducation Group Documentation    PATIENT'S NAME: Alistair Huggins  MRN:   [id number]  :   1980  ACCT. NUMBER: [id number]  DATE OF SERVICE: 23  START TIME:  8:30 AM  END TIME:  9:30 AM  FACILITATOR(S): Raj Engle LADC; Sammi Juarez LADC  TOPIC: BEH Pyschoeducation  Number of patients attending the group:  8  Group Length:  1 Hours    Skills Group Therapy Type: Healthy behaviors development    Summary of Group / Topics Discussed:    Symptom management skills        Group Attendance:  Attended group session    Patient's response to the group topic/interactions:  cooperative with task and listened actively    Patient appeared to be Attentive.         Client specific details:  Myrna gave appropriate feedback. .

## 2023-07-26 NOTE — GROUP NOTE
Group Therapy Documentation    PATIENT'S NAME: Alistair Huggins  MRN:   [id number]  :   1980  ACCT. NUMBER: [id number]  DATE OF SERVICE: 23  START TIME:  9:45 AM  END TIME: 11:15 AM  FACILITATOR(S): Janice Hanks LADC  TOPIC: BEH Group Therapy  Number of patients attending the group: 7    Group Length:  1.5 Hours    Group Therapy Type: Recovery strategies, Emotion processing, and Daily living/independence skills    Summary of Group / Topics Discussed:    Recovery Principles, Sober coping skills, Relationship/socialization, and Relapse prevention      Group Attendance:  Attended group session    Patient's response to the group topic/interactions:  cooperative with task    Patient appeared to be Actively participating, Attentive, and Engaged.        Client specific details:  Patient attended group session and was attentive and participative.

## 2023-07-26 NOTE — GROUP NOTE
Group Therapy Documentation    PATIENT'S NAME: Alistair Huggins  MRN:   [id number]  :   1980  ACCT. NUMBER: [id number]  DATE OF SERVICE: 23  START TIME: 12:30 PM  END TIME:  2:30 PM  FACILITATOR(S): Sabine Funes LADC  TOPIC: BEH Group Therapy  Number of patients attending the group:  8  Group Length:  2 Hours    Group Therapy Type: Recovery strategies    Summary of Group / Topics Discussed:    Cognitive behavioral therapy skills, Mindfulness/Relaxation, and Self-care activities    Patients celebrated a peer's graduation. Facilitator led group discussion on cognitive distortions and self-worth. Patients discussed how messages received in their youth by caregivers and adults can become distorted creating unhealthy core beliefs and negative thought patterns that can lead to maladaptive behaviors. Patients engaged in a grounding activity using rapid eye movement to calm physical reactions to intense emotions. Each patient had an opportunity to process and provide constructive feedback to peers who shared.      Group Attendance:  Attended group session    Patient's response to the group topic/interactions:  cooperative with task    Patient appeared to be Actively participating, Attentive, and Engaged.        Client specific details:  Patient fully engaged in group discussion, providing appropriate feedback, and sharing personal experiences with cognitive distortions.

## 2023-07-26 NOTE — PROGRESS NOTES
[facility] Weekly Treatment Plan Review      Date span:  23 To 23     Patient did have any absences during this time period.     Treatment Plan initiated on: 23.    Dimension1: Acute Intoxication/Withdrawal Potential -   Previous Dimension Ratin  Current Dimension Ratin  Date of Last Use: 23  Any reports of withdrawal symptoms - No    Dimension 2: Biomedical Conditions & Complications -   Previous Dimension Ratin  Current Dimension Ratin  Medical Concerns:  None reported.  Current Medications & Medication Changes:  Current Outpatient Medications   Medication    acetaminophen (TYLENOL) 325 MG tablet    alum & mag hydroxide-simethicone (MAALOX) 200-200-20 MG/5ML SUSP suspension    benzocaine-menthol (CEPACOL) 15-3.6 MG lozenge    folic acid (FOLVITE) 1 MG tablet    guaiFENesin (ROBITUSSIN) 20 mg/mL liquid    ibuprofen (ADVIL/MOTRIN) 200 MG tablet    loratadine (CLARITIN) 10 MG tablet    melatonin 3 MG tablet    multivitamin, therapeutic (THERA-VIT) TABS tablet    naltrexone (DEPADE/REVIA) 50 MG tablet    nicotine (NICORETTE) 4 MG gum    senna-docusate (SENOKOT-S/PERICOLACE) 8.6-50 MG tablet    thiamine (B-1) 100 MG tablet    triamcinolone (KENALOG) 0.1 % external cream     Current Facility-Administered Medications   Medication    [START ON 2023] naltrexone (VIVITROL) injection 380 mg     Facility-Administered Medications Ordered in Other Encounters   Medication    Self Administer Medications: Behavioral Services     Medication Prescriber: See chart.  Taking meds as prescribed? n/a  Medication side effects or concerns:  n/a  Outside medical appointments this week (list provider and reason for visit):  None reported.    Narrative: Pt seems fully functioning and seeks medical attention as needed. She attended lecture given by  nurse on Hepatitis C on 23.     Dimension 3: Emotional/Behavioral Conditions & Complications -   Previous Dimension Ratin  Current  "Dimension Ratin  PHQ2:       2023    10:00 AM 2023    11:00 AM 3/31/2023     8:06 AM 2023     5:36 PM 3/28/2022     7:01 AM   PHQ-2 (  [employer])   Q1: Little interest or pleasure in doing things 0 0 0 0 0   Q2: Feeling down, depressed or hopeless 0 0 0 1 0   PHQ-2 Score 0 0 0 1 0   Q1: Little interest or pleasure in doing things   Not at all Not at all Not at all   Q2: Feeling down, depressed or hopeless   Not at all Several days Not at all   PHQ-2 Score   0 1 0      GAD2:       2023    11:00 AM 2023    12:36 PM 2023    10:00 AM   JESSICA-2   Feeling nervous, anxious, or on edge 1 0 1   Not being able to stop or control worrying 1 0 0   JESSICA-2 Total Score 2 0 1     Mental health diagnosis anxiety; depression  Date of last SIB:  Patient denies history of or current SIB  Date of  last SI:  Patient reports passive SI in the past month  Date of last HI: Patient denies history of or current HI  Behavioral Targets: Follow recommendations of medical provider. Report any alcohol or drug use to counselor and any increase in withdrawal symptoms to nurse. Stabilize and maintain mental health.   Risk factors: The patient lacks relapse prevention, coping, and refusal skills, as well as a sober peer support network. She has dual issues of MI and CD, a tendency to isolate, and a significant history of trauma.   Protective factors:  restricted access to lethal means (firearms), dedication to family/friends, adherence with prescribed medication, agreement to use safety plan and access to a variety of clinical interventions  Current MH Assignments:  none at this time    Narrative: Current Mental Health symptoms include: none reported or observed. See below for suicide risk assessment. Pt does not endorse thoughts of self-harm or suicide ideation at this time. Pt's current CGI is 4/4.  She reports that her stress level has decreased; coping skills to manage stress used were \"reading, working out, and " "writing every day.\" Pt reported that her mood has not significantly changed this past week.    [place] Suicide Severity Rating Scale (Short Version)      2023     7:39 PM 2023     7:56 AM 2023     4:00 PM 2023    10:00 AM 2023    12:00 PM   [place] Suicide Severity Rating (Short Version)   Over the past 2 weeks have you felt down, depressed, or hopeless? yes       Over the past 2 weeks have you had thoughts of killing yourself? yes       Comments the  said on their way here patient said it would be better if she dies it will be easy for everybody.       Have you ever attempted to kill yourself? yes       When did this last happen? between 1 and 6 months ago       Q1 Wished to be Dead (Past Month) yes       Q2 Suicidal Thoughts (Past Month) yes       Q3 Suicidal Thought Method yes       Q4 Suicidal Intent without Specific Plan no       Q5 Suicide Intent with Specific Plan no       Q6 Suicide Behavior (Lifetime) yes       Within the Past 3 Months? yes       Level of Risk per Screen high risk       High Risk Required Interventions  On continuous in person observation      Interventions  Monitored via video      1. Wish to be Dead (Since Last Contact)   N Y N   Wish to be Dead Description (Since Last Contact)    Passive Thoughts    2. Non-Specific Active Suicidal Thoughts (Since Last Contact)   N N N   Actual Attempt (Since Last Contact)   N N N   Has subject engaged in non-suicidal self-injurious behavior? (Since Last Contact)   N N N   Interrupted Attempts (Since Last Contact)   N N N   Aborted or Self-Interrupted Attempt (Since Last Contact)   N N N   Preparatory Acts or Behavior (Since Last Contact)   N N N   Suicide (Since Last Contact)   N N N   Calculated C-SSRS Risk Score (Since Last Contact)   No Risk Indicated Low Risk No Risk Indicated       Dimension 4: Treatment Acceptance / Resistance -   Previous Dimension Rating:  3  Current Dimension Ratin * Risk Decreased*  MIGUEL ANGEL " "Diagnosis:  Alcohol Use Disorder   303.90 (F10.20) Severe In a controlled environment  Commitment to tx process/Stage of change- Pt consistently attends and participates in groups and lectures. She appears to be in the contemplative stage of change at this time.Patient shared personal experiences this week in group about her motivation and situations that have pushed/pulled her from staying motivated long term recovery. Patient continues to engage with peers in exploring her addiction knowledge.   MIGUEL ANGEL assignments - \"Survival- GRAEME Reading  and Step One\"     Narrative - Pt reports her motivation this week is \"my health, my family, and my children.\" Pt reports that her aftercare plans are \"in progress.\" She reports that she has gotten along \"100%\" with peers and staff this week. Patient reported being grateful for meeting and getting to know other peers in treatment, and learning about their lives and recovery story.     Dimension 5: Relapse / Continued Problem Potential -   Previous Dimension Ratin  Current Dimension Ratin  Relapses this week - None  Urges to use -  no  UA results -   23  Negative   23  Negative     Narrative- Pt reports craving 0/10, ten being highest possible. She reported not experiencing triggers to use  and used the following coping skill(s): \"talking to peers, yoga, reaching out to family members.\" Pt participated in the spirituality group, facilitated by Lorraine Rachel and  counseling staff was present during group.    Dimension 6: Recovery Environment -   Previous Dimension Ratin  Current Dimension Ratin  Family Involvement - n/a  Summarize attendance at family groups and family sessions - n/a  Family supportive of treatment?  Yes  Recreational activities - yoga, acupuncture, park    Narrative - Pt is spending free time with same-gender peers and attending at least 3 virtual 12-step meetings weekly while in . She reports having had contact with her father, " her  and children this week. Pt participated in weekend workshop on relationships and completed all activities.    Progress made on transition planning goals: none at this time    Justification for Continued Treatment at this Level of Care: Risk ratings indicate continued need for this level of care. Pt has been unable to maintain abstinence from alcohol while at the outpatient level of care, lacks long-term sober maintenance skills, lacks sober coping skills and has mental health concerns which are exacerbated by substance use.     Treatment coordination activities this week:  None  Need for peer recovery support referral? No    Discharge Planning:  Target Discharge Date/Timeframe: 8-4-23  Med Mgmt Provider/Appt:  TBD  Ind therapy Provider/Appt:  TBD  Family therapy Provider/Appt:  TBD  Other referrals:  none at this time.    Has vulnerable adult status changed? No    Interdisciplinary Clinical Supervision including: GISSEL, Mental health professional and RN    Are Treatment Plan goals/objectives effective? Yes  *If no, list changes to treatment plan:    Are the current goals meeting client's needs? Yes  *If no, list the changes to treatment plan.    Patient Input / Response: Pt contributed to treatment plan review.    *Client agrees with any changes to the treatment plan: N/A  *Client received copy of changes: N/A  *Client is aware of right to access a treatment plan review: Yes    GISSEL Lynn

## 2023-07-27 ENCOUNTER — HOSPITAL ENCOUNTER (OUTPATIENT)
Dept: BEHAVIORAL HEALTH | Facility: CLINIC | Age: 43
Discharge: HOME | End: 2023-07-27
Attending: FAMILY MEDICINE
Payer: COMMERCIAL

## 2023-07-27 PROCEDURE — H2035 A/D TX PROGRAM, PER HOUR: HCPCS | Mod: HQ

## 2023-07-27 PROCEDURE — 1002N00001 HC LODGING PLUS FACILITY CHARGE ADULT

## 2023-07-27 NOTE — GROUP NOTE
Group Therapy Documentation    PATIENT'S NAME: Alistair Huggins  MRN:   [id number]  :   1980  ACCT. NUMBER: [id number]  DATE OF SERVICE: 23  START TIME: 12:30 PM  END TIME:  2:30 PM  FACILITATOR(S): Sabine Funes LADC  TOPIC: BEH Group Therapy  Number of patients attending the group:  8  Group Length:  2 Hours    Group Therapy Type: Recovery strategies    Summary of Group / Topics Discussed:    Recovery Principles, Cognitive behavioral therapy skills, and Coping/DBT informed care    Facilitator led group discussion on core beliefs. Patients gained knowledge on what core beliefs are, how they develop, and how these beliefs impact self-worth and contribute to cognitive distortions. Patients identified an unhealthy core belief they hold and discussed how that creates automatic negative thoughts and cognitive distortions which can lead to maladaptive behaviors. Each patient had an opportunity to process and provide constructive feedback to peers who shared.       Group Attendance:  Attended group session    Patient's response to the group topic/interactions:  cooperative with task    Patient appeared to be Actively participating, Attentive, and Engaged.        Client specific details:  Patient fully engaged in group discussion, providing appropriate feedback, and sharing personal insights on self-esteem and negative thinking patterns.

## 2023-07-27 NOTE — GROUP NOTE
"Group Therapy Documentation    PATIENT'S NAME: Alistair Huggins  MRN:   [id number]  :   1980  ACCT. NUMBER: [id number]  DATE OF SERVICE: 23  START TIME:  9:00 AM  END TIME: 11:00 AM  FACILITATOR(S): Lia Metz LADC  TOPIC: BEH Group Therapy  Number of patients attending the group:  8  Group Length:  2 Hours    Group Therapy Type: Recovery strategies    Summary of Group / Topics Discussed:    Recovery Principles and Sober coping skills     Patients completed daily check ins and the question of the day, \"How do you cope with intense feelings?\"    Patients presented assignments and shared feedback.     Patients engaged in reading and processing daily meditations.      Group Attendance:  Attended group session    Patient's response to the group topic/interactions:  cooperative with task    Patient appeared to be Actively participating.        Client specific details:  Myrna attended AM small group therapy. Patient participated and remained engaged throughout group. Patient shared AM Check in Affirmation and goal for the day. Patient shared her insights on the meditation readings. Patient reported feeling, \"  grateful.\"   Patient shared her drug history assignment in group.   Patient was open to feedback from peers, and willing to process the information shared from the assignment.     "

## 2023-07-27 NOTE — GROUP NOTE
Group Therapy Documentation    PATIENT'S NAME: Alistair Huggins  MRN:   [id number]  :   1980  ACCT. NUMBER: [id number]  DATE OF SERVICE: 23  START TIME:  3:00 PM  END TIME:  4:00 PM  FACILITATOR(S): Sammi Juarez LADC; Haley Greco LADC  TOPIC: BEH Group Therapy  Number of patients attending the group:  26  Group Length:  1 Hours    Group Therapy Type: Health and wellbeing     Summary of Group / Topics Discussed:    Self-care activities      Group Attendance:  Attended group session    Patient's response to the group topic/interactions:  cooperative with task    Patient appeared to be Actively participating, Attentive, and Engaged.        Client specific details: Myrna was an active participant in lecture on nutritional supplements and balanced diet presented by Dr. Christa Angela.

## 2023-07-28 ENCOUNTER — HOSPITAL ENCOUNTER (OUTPATIENT)
Dept: BEHAVIORAL HEALTH | Facility: CLINIC | Age: 43
Discharge: HOME | End: 2023-07-28
Attending: FAMILY MEDICINE
Payer: COMMERCIAL

## 2023-07-28 PROCEDURE — 1002N00001 HC LODGING PLUS FACILITY CHARGE ADULT

## 2023-07-28 PROCEDURE — H2035 A/D TX PROGRAM, PER HOUR: HCPCS | Mod: HQ

## 2023-07-28 NOTE — GROUP NOTE
Group Therapy Documentation    PATIENT'S NAME: Alistair Huggins  MRN:   [id number]  :   1980  ACCT. NUMBER: [id number]  DATE OF SERVICE: 23  START TIME: 12:30 PM  END TIME:  2:30 PM  FACILITATOR(S): Lia Metz LADC  TOPIC: BEH Group Therapy  Number of patients attending the group:  8  Group Length:  2 Hours    Group Therapy Type: Recovery strategies    Summary of Group / Topics Discussed:    Mental Health , Coping Skills, Self Image    Patients viewed an addiction/mental health based film. This film addressed: self image, self worth, self respect,self perception, and self talk and how everything is incorporated into sense of self.     Patients engaged in a thorough discussion about the film, and shared personal experiences, and how the film helped them process their own life events.        Group Attendance:  Attended group session    Patient's response to the group topic/interactions:  cooperative with task    Patient appeared to be Actively participating.        Client specific details:  Myrna attended PM group therapy. Patient engaged in discussion and participated in sharing feedback to his peers.

## 2023-07-28 NOTE — GROUP NOTE
"Group Therapy Documentation    PATIENT'S NAME: Alistair Huggins  MRN:   [id number]  :   1980  ACCT. NUMBER: [id number]  DATE OF SERVICE: 23  START TIME:  9:00 AM  END TIME: 11:00 AM  FACILITATOR(S): Sabine Funes LADC  TOPIC: BEH Group Therapy  Number of patients attending the group:  8  Group Length:  2 Hours    Group Therapy Type: Recovery strategies    Summary of Group / Topics Discussed:    Cognitive behavioral therapy skills, Emotions/expression, and Self-care activities    Patients checked in to primary group by describing current mood, stating an affirmation and gratitude, and identifying one goal they have for today. Several patients processed current challenges they are experiencing and solicited feedback from peers. Facilitator led group discussion on positive thinking. Patients gained knowledge on what positive thinking is, and how it can help with challenging thought distortions and regulating intense emotions. Patients practiced using DBT skills of mindfulness, and non-judgment to gain new perspective on a situation, and discussed how using positive thinking strategies can benefit their recovery and avert return to use. Each patient had an opportunity to process, and provide constructive feedback to peers who shared.      Group Attendance:  Attended group session    Patient's response to the group topic/interactions:  cooperative with task    Patient appeared to be Actively participating, Attentive, and Engaged.        Client specific details:  Patient fully engaged in group discussion, sharing personal insights, and providing appropriate feedback to peers who shared. Patient processed feeling \"content, and optimistic,\" noting, \"I am grateful for a book [] loaned me, I am learning a lot about mindfulness.\"    "

## 2023-07-29 ENCOUNTER — HOSPITAL ENCOUNTER (OUTPATIENT)
Dept: BEHAVIORAL HEALTH | Facility: CLINIC | Age: 43
Discharge: HOME | End: 2023-07-29
Attending: FAMILY MEDICINE
Payer: COMMERCIAL

## 2023-07-29 PROCEDURE — H2035 A/D TX PROGRAM, PER HOUR: HCPCS | Mod: HQ

## 2023-07-29 PROCEDURE — 1002N00001 HC LODGING PLUS FACILITY CHARGE ADULT

## 2023-07-29 NOTE — GROUP NOTE
Group Therapy Documentation    PATIENT'S NAME: Alistair Huggins  MRN:   [id number]  :   1980  ACCT. NUMBER: [id number]  DATE OF SERVICE: 23  START TIME: 12:30 PM  END TIME:  2:30 PM  FACILITATOR(S): Raj Engle LADC; Haley Greco LADC; Lia Metz LADC  TOPIC: BEH Group Therapy  Number of patients attending the group:  27  Group Length:  2 Hours    Group Therapy Type: Recovery strategies    Summary of Group / Topics Discussed:    Sober coping skills and Relapse prevention    Group Attendance:  Attended group session    Patient's response to the group topic/interactions:  cooperative with task    Patient appeared to be Actively participating and Attentive.        Client specific details: Pt listened to a presentation on communication skills and types, and participated in group activities illustrating and practicing them.

## 2023-07-29 NOTE — GROUP NOTE
Group Therapy Documentation    PATIENT'S NAME: Alistair Huggins  MRN:   [id number]  :   1980  ACCT. NUMBER: [id number]  DATE OF SERVICE: 23  START TIME:  9:00 AM  END TIME: 11:00 AM  FACILITATOR(S): Haley Greco LADC; Raj Engle LADC; Lia Metz LADC  TOPIC: BEH Group Therapy  Number of patients attending the group:  27  Group Length:  2 Hours    Group Therapy Type: Recovery strategies    Summary of Group / Topics Discussed:    Relapse prevention    Group Attendance:  Attended group session    Patient's response to the group topic/interactions:  cooperative with task    Patient appeared to be Attentive and Engaged.        Client specific details: Pt listened respectfully to a presentation on relapse prevention and asked appropriate questions.

## 2023-07-30 ENCOUNTER — HOSPITAL ENCOUNTER (OUTPATIENT)
Dept: BEHAVIORAL HEALTH | Facility: CLINIC | Age: 43
Discharge: HOME | End: 2023-07-30
Attending: FAMILY MEDICINE
Payer: COMMERCIAL

## 2023-07-30 PROCEDURE — 1002N00001 HC LODGING PLUS FACILITY CHARGE ADULT

## 2023-07-30 PROCEDURE — H2035 A/D TX PROGRAM, PER HOUR: HCPCS | Mod: HQ

## 2023-07-30 NOTE — GROUP NOTE
Group Therapy Documentation    PATIENT'S NAME: Alistair Huggins  MRN:   [id number]  :   1980  ACCT. NUMBER: [id number]  DATE OF SERVICE: 23  START TIME:  9:00 AM  END TIME: 11:00 AM  FACILITATOR(S): Eliana Osuna RN; Raj Engle LADC; Haley Greco LADC  TOPIC: BEH Group Therapy  Number of patients attending the group:  27  Group Length:  2 Hours    Group Therapy Type: Health and wellbeing     Summary of Group / Topics Discussed:    Balanced lifestyle and Relapse prevention    Group Attendance:  Attended group session    Patient's response to the group topic/interactions:  cooperative with task    Patient appeared to be Attentive and Engaged.        Client specific details: Pt listened respectfully to a presentation on TB and STIs, and took part in an activity.

## 2023-07-30 NOTE — GROUP NOTE
Psychoeducation Group Documentation    PATIENT'S NAME: Alistair Huggins  MRN:   [id number]  :   1980  ACCT. NUMBER: [id number]  DATE OF SERVICE: 23  START TIME: 12:30 PM  END TIME:  1:30 PM  FACILITATOR(S): Raj Engle LADC; Haley Greco LADC  TOPIC: BEH Pyschoeducation  Number of patients attending the group:  9  Group Length:  1 Hours    Skills Group Therapy Type: Relationship skills development    Summary of Group / Topics Discussed:    Relationship/social skills        Group Attendance:  Attended group session    Patient's response to the group topic/interactions:  cooperative with task and listened actively    Patient appeared to be Attentive.         Client specific details:  Myrna gave appropriate feedback. .

## 2023-07-30 NOTE — PROGRESS NOTES
Nursing Discharge Planning Meeting    Writer completed discharge planning meeting with patient. Discharge is planned for 8/4/23    Discussed appropriate follow up care to manage Medical/MH/MIGUEL ANGEL and to obtain medication refills. Patient given a copy of their current medications for reference. Questions were answered at this time and the patient verbalized an understanding of the post-discharge follow up plan.    Patient will f/u with PCP as recommended and/or is aware of upcoming appt:  [facility] - Professional Bldg  [address], [address] [facility] [address] [phone]  Appt date and time: 8/4/23 at 10:30am  Provider: Dr Kirt Linton    Patient will f/u with Addiction Medicine Provider as recommended and/or is aware of upcoming appt:  [facility]  [address] / [address]  [place]  [address]  [phone]  Appt date and time: 8/11/23 at 10AM - Virtual  Provider: Regina Joyner NPPatient will f/u with Addiction Appt    Vivitrol infusion administered 8/1/23     Continue to support patient in discharge planning as needed to assure appropriate continuity of care.     Tobacco Cessation  Patient participated in the nicotine replacement therapy for tobacco cessation or reduction during their treatment programming: Yes    The patient was provided with community resources for follow-up to continue tobacco cessation support once in the community. Also the patient was encouraged to discuss their tobacco cessation efforts with the primary care provider.    [facility]  Nurse Liaison / CD [facility]  O: [phone]  Fax:[phone]  [facility] [id number]  M-F: 7AM to 5:30PM   Sat-Sun: 7AM to 3PM  After hours: [phone]

## 2023-07-31 ENCOUNTER — HOSPITAL ENCOUNTER (OUTPATIENT)
Dept: BEHAVIORAL HEALTH | Facility: CLINIC | Age: 43
Discharge: HOME | End: 2023-07-31
Attending: FAMILY MEDICINE
Payer: COMMERCIAL

## 2023-07-31 ENCOUNTER — TELEPHONE (OUTPATIENT)
Dept: ADDICTION MEDICINE | Facility: CLINIC | Age: 43
End: 2023-07-31
Payer: COMMERCIAL

## 2023-07-31 PROCEDURE — H2035 A/D TX PROGRAM, PER HOUR: HCPCS | Mod: HQ

## 2023-07-31 PROCEDURE — 1002N00001 HC LODGING PLUS FACILITY CHARGE ADULT

## 2023-07-31 NOTE — TELEPHONE ENCOUNTER
"Dr Linton,     This pt, \"Myrna\" who is currently at [facility] and discharging on 8/4, will be establishing care with you for PCP on 8/4/23.    Pt will be receiving her first monthly Vivitrol infusion on 8/1/23.  Pt is directed to stop her oral Naltrexone at that time.    Provider Regina Joyner NP from [facility] has seen this pt for Addiction med and ordered the initial infusion, but pt verbalized she wants to minimize the amt of providers.    For ongoing monthly subsequent Vivitrol infusions... are you able to order these?       BTW, this is Myrna's first time in treatment for MIGUEL ANGEL and she did very well.    I have included Dr Richards so that ya'll can converse about the logistics of Vivitrol order referral should you need to process this.    Thank you and appreciate your feedback!    Eliana Osuna RN  [facility]  Nurse Liaison / CD [facility] ([facility])  [facility]  O:[phone]  F:[phone]  [place] [id number]  [facility] After hours([facility]):[phone]  [facility] admin counselor:[phone]  CD assess:[phone]      "

## 2023-07-31 NOTE — GROUP NOTE
Group Therapy Documentation    PATIENT'S NAME: Alistair Huggins  MRN:   [id number]  :   1980  ACCT. NUMBER: [id number]  DATE OF SERVICE: 23  START TIME:  9:00 AM  END TIME: 11:00 AM  FACILITATOR(S): Janice Hanks LADC  TOPIC: BEH Group Therapy  Number of patients attending the group:  9    Group Length:  2 Hours    Group Therapy Type: Recovery strategies, Emotion processing, and Daily living/independence skills    Summary of Group / Topics Discussed:    Recovery Principles, Sober coping skills, Relationship/socialization, and Relapse prevention      Group Attendance:  Attended group session    Patient's response to the group topic/interactions:  cooperative with task    Patient appeared to be Actively participating, Attentive, and Engaged.        Client specific details:  Patient attended group session and was attentive and participative.

## 2023-07-31 NOTE — GROUP NOTE
Group Therapy Documentation    PATIENT'S NAME: Alistair Huggins  MRN:   [id number]  :   1980  ACCT. NUMBER: [id number]  DATE OF SERVICE: 23  START TIME: 12:30 PM  END TIME:  2:30 PM  FACILITATOR(S): Sabine Funes LADC  TOPIC: BEH Group Therapy  Number of patients attending the group:  8  Group Length:  2 Hours    Group Therapy Type: Recovery strategies    Summary of Group / Topics Discussed:    Sober coping skills, Balanced lifestyle, and Self-care activities    Facilitator led group discussion on sober activities and sober support groups. Patients gained knowledge on the different types of sober support groups and how to find meetings in their neighborhoods. Patients also learned how to get involved in sober recreational activities within the community. Patients gained insight in how becoming involved with sober activities and expanding their sober social network can benefit their recovery goals and help avert return to use of substances. Patients discussed sober support activities they can get involved in based on their hobbies and interests. Each patient had an opportunity to process, ask questions, and provide constructive feedback to peers who shared.      Group Attendance:  Attended group session    Patient's response to the group topic/interactions:  cooperative with task    Patient appeared to be Actively participating, Attentive, and Engaged.        Client specific details:  Patient engaged in group discussion, and activity, providing appropriate feedback and sharing personal insights on her sober support.

## 2023-08-01 ENCOUNTER — INFUSION THERAPY VISIT (OUTPATIENT)
Dept: INFUSION THERAPY | Facility: CLINIC | Age: 43
End: 2023-08-01
Payer: COMMERCIAL

## 2023-08-01 ENCOUNTER — HOSPITAL ENCOUNTER (OUTPATIENT)
Dept: BEHAVIORAL HEALTH | Facility: CLINIC | Age: 43
Discharge: HOME | End: 2023-08-01
Attending: FAMILY MEDICINE
Payer: COMMERCIAL

## 2023-08-01 VITALS
HEART RATE: 75 BPM | OXYGEN SATURATION: 99 % | SYSTOLIC BLOOD PRESSURE: 107 MMHG | DIASTOLIC BLOOD PRESSURE: 72 MMHG | TEMPERATURE: 98.1 F

## 2023-08-01 DIAGNOSIS — F10.20 ALCOHOL USE DISORDER, SEVERE, DEPENDENCE (H): Primary | ICD-10-CM

## 2023-08-01 PROCEDURE — H2035 A/D TX PROGRAM, PER HOUR: HCPCS

## 2023-08-01 PROCEDURE — 96372 THER/PROPH/DIAG INJ SC/IM: CPT

## 2023-08-01 PROCEDURE — H2035 A/D TX PROGRAM, PER HOUR: HCPCS | Mod: HQ

## 2023-08-01 PROCEDURE — 250N000011 HC RX IP 250 OP 636

## 2023-08-01 PROCEDURE — 1002N00001 HC LODGING PLUS FACILITY CHARGE ADULT

## 2023-08-01 RX ORDER — EPINEPHRINE 1 MG/ML
0.3 INJECTION, SOLUTION INTRAMUSCULAR; SUBCUTANEOUS EVERY 5 MIN PRN
Status: CANCELLED | OUTPATIENT
Start: 2023-08-29

## 2023-08-01 RX ORDER — HEPARIN SODIUM (PORCINE) LOCK FLUSH IV SOLN 100 UNIT/ML 100 UNIT/ML
5 SOLUTION INTRAVENOUS
Status: CANCELLED | OUTPATIENT
Start: 2023-08-29

## 2023-08-01 RX ORDER — HEPARIN SODIUM,PORCINE 10 UNIT/ML
5-20 VIAL (ML) INTRAVENOUS DAILY PRN
Status: CANCELLED | OUTPATIENT
Start: 2023-08-29

## 2023-08-01 RX ORDER — ALBUTEROL SULFATE 90 UG/1
1-2 AEROSOL, METERED RESPIRATORY (INHALATION)
Status: CANCELLED
Start: 2023-08-29

## 2023-08-01 RX ORDER — DIPHENHYDRAMINE HYDROCHLORIDE 50 MG/ML
50 INJECTION INTRAMUSCULAR; INTRAVENOUS
Status: CANCELLED
Start: 2023-08-29

## 2023-08-01 RX ORDER — ALBUTEROL SULFATE 0.83 MG/ML
2.5 SOLUTION RESPIRATORY (INHALATION)
Status: CANCELLED | OUTPATIENT
Start: 2023-08-29

## 2023-08-01 RX ORDER — METHYLPREDNISOLONE SODIUM SUCCINATE 125 MG/2ML
125 INJECTION, POWDER, LYOPHILIZED, FOR SOLUTION INTRAMUSCULAR; INTRAVENOUS
Status: CANCELLED
Start: 2023-08-29

## 2023-08-01 RX ADMIN — NALTREXONE 380 MG: KIT at 15:14

## 2023-08-01 NOTE — PROGRESS NOTES
Infusion Nursing Note:  Alistair Huggins presents today for Patient presents with:  Imm/Inj: Vivitrol    Administrations This Visit       naltrexone (VIVITROL) injection 380 mg       Admin Date  08/01/2023 Action  $Given Dose  380 mg Route  Intramuscular Administered By  Esperanza Medina RN                  /72 (BP Location: Left arm, Patient Position: Sitting, Cuff Size: Adult Regular)   Pulse 75   Temp 98.1  F (36.7  C)   LMP 06/21/2023   SpO2 99%     .    Patient seen by provider today: No   present during visit today: Not Applicable.    Note: Patient identification verified by name and date of birth.  Assessment completed.  Vitals recorded in Doc Flowsheets.  Patient was provided with education regarding medication/procedure and possible side effects.  Patient verbalized understanding.    During [facility] appointment, orders from Regina Currie were completed.  Frequency: every 28 days.   .    Post Infusion Assessment:  Patient tolerated injection without incident.  Patient observed for 15 minutes post injection per protocol.       Discharge Plan:   Discharge instructions reviewed with: Patient.  Patient and/or family verbalized understanding of discharge instructions and all questions answered.  Copy of AVS reviewed with patient and/or family.  Patient will need to make appointment for next injection  Patient discharged in stable condition accompanied by: self.  Departure Mode: Ambulatory.      Esperanza Medina RN

## 2023-08-01 NOTE — GROUP NOTE
"Group Therapy Documentation    PATIENT'S NAME: Alistair Huggins  MRN:   [id number]  :   1980  ACCT. NUMBER: [id number]  DATE OF SERVICE: 23  START TIME:  9:00 AM  END TIME: 11:00 AM  FACILITATOR(S): Lai Metz LADC  TOPIC: BEH Group Therapy  Number of patients attending the group: 9  Group Length:  2 Hours    Group Therapy Type: Recovery strategies    Summary of Group / Topics Discussed:    Recovery Principles, Trauma informed care, Disease of addiction, and Relapse prevention      Patients completed daily check ins and the question of the day, \" What is a communication skill that you need to work on?\"    Patients presented assignments on GRAEME and Relapse Prevention and shared helpful feedback.    Patients engaged in reading and processing daily meditations.    Patient discussed childhood experiences in GRAEME homes, and how the dysfunction has played a role in their adult lives.   Family member roles and Laundry List were reviewed in group.   Patients shared insight on relating to many of the statements on the list.     Patients discussed the importance of a Prevention Plan, and the details revolving around what the patients are willing to do for their recovery.       Group Attendance:  Attended group session    Patient's response to the group topic/interactions:  cooperative with task    Patient appeared to be Actively participating.        Client specific details:  Myrna  attended AM small group therapy. Patient participated and remained engaged throughout group. Patient shared AM Check in Affirmation and goal for the day. Patient shared her insights on the meditation readings. Patient reported feeling, \" excited\" due to nearing the end of her treatment, and she has been getting things lined up.     Patient shared her GRAEME assignment and did a great job. Patient was offered feedback from peers. Peers shared feeling as they related to patients assignment. Patient shared experiences from her " childhood and how this has lead her to be who she is as an adult and fueled her addiction.

## 2023-08-01 NOTE — GROUP NOTE
Group Therapy Documentation    PATIENT'S NAME: Alistair Huggins  MRN:   [id number]  :   1980  ACCT. NUMBER: [id number]  DATE OF SERVICE: 23  START TIME: 12:30 PM  END TIME:  2:30 PM  FACILITATOR(S): Sabine Funes LADC  TOPIC: BEH Group Therapy  Number of patients attending the group:  9  Group Length:  2 Hours    Group Therapy Type: Recovery strategies    Summary of Group / Topics Discussed:    Spiritual Care, Balanced lifestyle, and Self-care activities    Spiritual Group Therapy consisted of Spiritual Care and addressing Principles that are important in recovery, and wellness of self. Patients and facilitators (Valerie & GISSEL)  reviewed topics related to sense of self, identity, and relations to others within spirituality/relision/nonspiritual concepts. Patients gained insight on how spending time in nature can benefit spiritual practice and mental health. Each patient had an opportunity to process and provide constructive feedback to peers who shared.      Group Attendance:  Attended group session    Patient's response to the group topic/interactions:  cooperative with task    Patient appeared to be Actively participating, Attentive, and Engaged.        Client specific details:  Patient fully participated in group process, providing appropriate feedback, and sharing personal insights on spirituality.

## 2023-08-01 NOTE — PATIENT INSTRUCTIONS
Dear Alistair Huggins    Thank you for choosing [facility] ([facility]) for your injection.  The following information is a summary of our appointment as well as important reminders.      We look forward in seeing you on your next appointment here at [facility] ([facility]).  Please don t hesitate to call us at [phone] to reschedule any of your appointments or to speak with one of the [facility] registered nurses.  It was a pleasure taking care of you today.    Sincerely,    [facility]  [facility]  [address] [place] [address]  [id number]  Phone:  [phone]

## 2023-08-01 NOTE — PROGRESS NOTES
INDIVIDUAL SESSION SUMMARY    D) Met with client on 8/1/23 from 9:00-9:25am. Client is in the [facility]. Client shared feeling prepared and ready to return home this Friday and start her aftercare program. For self-care, client shared that she will start couples counseling, spend time with her children, and attend 12-step groups. Client reported that she has gained new insights into her relationships and is looking forward to focusing on her self and learning to live a sober life.     I)  Provided client with verbal interventions including validation, compassion, and support.  Provided positive reinforcement for progress towards goals, gains in insight, and application of skills.   Discussed the importance of recovery behaviors such as forming/utilizing a sober support network, daily rituals, goal setting, and identifying relapse warning signs.     A) Client appears to be taking steps to advocate for herself and put her needs first.   Client appears to have be using some new skills to regulate impulses and manage stress.   Client appears to understand the importance of having a sober support network.   Client appears to have strong internal motivation and would benefit from continued support with a focus on processing past traumas and losses, relational discord, healthy communication, relapse prevention and self-care.     P) No future sessions scheduled.   This therapist has provided the client with several therapist referrals to contact in the community or the client will resume sessions with their therapist in the community.     Yennifer Scott, ALEJANDRA  8/1/2023

## 2023-08-01 NOTE — PROGRESS NOTES
Name: Alistair Huggins  Date: 8/1/2023  Medical Record: [id number]  Envelope Number: [id number]  List of Contents (List each item separately in new row):  1 bottle of Naltrexone HCL 50 Mg tablets  Admission:  I am responsible for any personal items that are not sent to the safe or pharmacy.  [facility] is not responsible for loss, theft or damage of any property in my possession.  Patient Signature:  ___________________________________________     Date/Time:__________________________  Staff Signature:   __________________________________     Date/Time:__________________________  [facility] Staff person, if patient is unable/unwilling to sign:    __________________________________________________________       Date/Time: _________________________  **All medications are packaged by [facility] staff and securely stored on [facility]. Medications left by patients at discharge will be packaged by [facility] staff and transported by [facility] staff to inpatient pharmacy for storage.**  Discharge:  [facility] has returned all of my personal belongings:    Patient Signature: ________________________________________     Date/Time: ____________________________________    Staff Signature: ______________________________________     Date/Time:_____________________________________

## 2023-08-02 ENCOUNTER — HOSPITAL ENCOUNTER (OUTPATIENT)
Dept: BEHAVIORAL HEALTH | Facility: CLINIC | Age: 43
Discharge: HOME | End: 2023-08-02
Attending: FAMILY MEDICINE
Payer: COMMERCIAL

## 2023-08-02 PROCEDURE — H2035 A/D TX PROGRAM, PER HOUR: HCPCS | Mod: HQ

## 2023-08-02 PROCEDURE — 1002N00001 HC LODGING PLUS FACILITY CHARGE ADULT

## 2023-08-02 NOTE — GROUP NOTE
"Group Therapy Documentation    PATIENT'S NAME: Alistair Huggins  MRN:   [id number]  :   1980  ACCT. NUMBER: [id number]  DATE OF SERVICE: 23  START TIME:  9:40 AM  END TIME: 11:30 AM  FACILITATOR(S): Lia Metz LADC  TOPIC: BEH Group Therapy  Number of patients attending the group:  9  Group Length:  2 Hours    Group Therapy Type: Recovery strategies    Summary of Group / Topics Discussed:    Recovery Principles, Disease of addiction, and Relapse prevention    Patients completed daily check ins and the question of the day, \" What was your reality check' after coming into treatment?     Patients presented assignments and shared feedback.    Patients discussed unmanageable lifestyles and how its effected their lives and lead to continued use spirals.      Patients engaged in reading and processing daily meditations.      Group Attendance:  Attended group session    Patient's response to the group topic/interactions:  cooperative with task    Patient appeared to be Actively participating.        Client specific details: Myrna  attended AM small group therapy. Patient participated and remained engaged throughout group. Patient shared AM Check in Affirmation and goal for the day. Patient shared her insights on the meditation readings. Patient reported feeling, \" Grateful.\"     "

## 2023-08-02 NOTE — GROUP NOTE
Group Therapy Documentation    PATIENT'S NAME: Alistair Huggins  MRN:   [id number]  :   1980  ACCT. NUMBER: [id number]  DATE OF SERVICE: 23  START TIME:  8:30 AM  END TIME:  9:30 AM  FACILITATOR(S): Lia Metz [facility]; Janice Hanks [facility]; Kal Tang [facility]  TOPIC: BEH Group Therapy  Number of patients attending the group:  24  Group Length:  1 Hours    Group Therapy Type: Recovery strategies    Summary of Group / Topics Discussed:    Recovery Principles and Balanced lifestyle      Facilitator Mr. Kal Tang [facility] delivered a presentation on Yoga. Exploring the benefits of decreasing tension, stress and increasing motivation, flexibility, wellness, and overall wellbeing.   Kal explained the importance of moving your body, getting to know your body, and providing a healhty routine for your body in recovery.       Group Attendance:  Attended group session    Patient's response to the group topic/interactions:  cooperative with task    Patient appeared to be Actively participating.        Client specific details:  Myrna attended morning lecture group-focused on Recovery Benefits of Yoga. Patient remained engaged and participated throughout group session.

## 2023-08-02 NOTE — GROUP NOTE
"Group Therapy Documentation    PATIENT'S NAME: Alistair Huggins  MRN:   [id number]  :   1980  ACCT. NUMBER: [id number]  DATE OF SERVICE: 23  START TIME: 12:30 PM  END TIME:  2:30 PM  FACILITATOR(S): Sabine Funes LADC  TOPIC: BEH Group Therapy  Number of patients attending the group:  9  Group Length:  2 Hours    Group Therapy Type: Recovery strategies    Summary of Group / Topics Discussed:    Sober coping skills, Cognitive behavioral therapy skills, and Emotions/expression    Facilitator led group discussion on assertive communication and setting healthy boundaries using DBT skill \"DEAR MAN\". Patients gained knowledge on the skill DEAR MAN and how to set boundaries. Patients identified boundaries they need to set in early recovery to avert return to use and discussed challenges in setting boundaries with family and loved ones. Patients then practiced writing a DEAR MAN script for a boundary they need to set. Each patient had an opportunity to process and provide constructive feedback to peers who shared.       Group Attendance:  Attended group session    Patient's response to the group topic/interactions:  cooperative with task    Patient appeared to be Actively participating, Attentive, and Engaged.        Client specific details:  Patient fully engaged in group discussion on assertive communications and boundaries, providing appropriate feedback, and sharing personal insights. Patient processed a boundary she needs to set with a loved one and how it will help her with her recovery goals and self-worth.    "

## 2023-08-03 ENCOUNTER — HOSPITAL ENCOUNTER (OUTPATIENT)
Dept: BEHAVIORAL HEALTH | Facility: CLINIC | Age: 43
Discharge: HOME | End: 2023-08-03
Attending: FAMILY MEDICINE
Payer: COMMERCIAL

## 2023-08-03 PROCEDURE — H2035 A/D TX PROGRAM, PER HOUR: HCPCS | Mod: HQ,59

## 2023-08-03 PROCEDURE — 1002N00001 HC LODGING PLUS FACILITY CHARGE ADULT

## 2023-08-03 NOTE — GROUP NOTE
"Group Therapy Documentation    PATIENT'S NAME: Alistair Huggins  MRN:   [id number]  :   1980  ACCT. NUMBER: [id number]  DATE OF SERVICE: 23  START TIME:  9:00 AM  END TIME: 11:00 AM  FACILITATOR(S): Lia Metz LADC  TOPIC: BEH Group Therapy  Number of patients attending the group:  9  Group Length:  2 Hours    Group Therapy Type: Recovery strategies    Summary of Group / Topics Discussed:    Recovery Principles, Sober coping skills, Relationship/socialization, Balanced lifestyle, Disease of addiction, Relapse prevention, and Self-care activities    Patients completed daily check ins and the question of the day, \" What is your weekly goal to have accomplished by Ronnie Evening?     Patients presented assignments related to Post Acute Withdrawal and shared feedback.    Patients discussed the differences between \"Abstaining/Being Sober vs Abstinent in active Recovery.\"      Patients engaged in reading and processing daily meditations.      Group Attendance:  Attended group session     Patient's response to the group topic/interactions:  cooperative with task    Patient appeared to be Actively participating.        Client specific details: Myrna attended AM small group therapy. Patient participated and remained engaged throughout group. Patient shared AM Check in Affirmation and goal for the day. Patient shared her insights on the meditation readings. Patient shared and processed her feelings with staff.   "

## 2023-08-03 NOTE — GROUP NOTE
"Group Therapy Documentation    PATIENT'S NAME: Alistair Huggins  MRN:   [id number]  :   1980  ACCT. NUMBER: [id number]  DATE OF SERVICE: 23  START TIME: 12:30 PM  END TIME:  2:30 PM  FACILITATOR(S): Sabine Funes LADC  TOPIC: BEH Group Therapy  Number of patients attending the group:  9  Group Length:  2 Hours    Group Therapy Type: Recovery strategies    Summary of Group / Topics Discussed:    Sober coping skills, Disease of addiction, and Relapse prevention    Facilitator led group discussion on post-acute withdrawal in early recovery. Patients gained knowledge on the cause of post-acute withdrawal, common symptoms, and ways to manage symptoms to avert return to use of substances. Patients celebrated two peers graduating from the program. Each patient had an opportunity to process, and provide constructive feedback to peers who shared.      Group Attendance:  Attended group session    Patient's response to the group topic/interactions:  cooperative with task    Patient appeared to be Actively participating, Attentive, and Engaged.        Client specific details:  Patient fully participated in group discussion, providing appropriate feedback, and sharing personal insights. Patient processed a using dream she had last night, noting, \"I am grateful it was a dream, but it felt very real for a moment when I woke up.\"      "

## 2023-08-03 NOTE — GROUP NOTE
Group Therapy Documentation    PATIENT'S NAME: Alistair Huggins  MRN:   [id number]  :   1980  ACCT. NUMBER: [id number]  DATE OF SERVICE: 23  START TIME:  3:00 PM  END TIME:  4:00 PM  FACILITATOR(S): Ad Mares LADC  TOPIC: BEH Group Therapy  Number of patients attending the group:  10  Group Length:  1 Hours    Group Therapy Type: Daily living/independence skills    Summary of Group / Topics Discussed:    Cognitive behavioral therapy skills      Group Attendance:  Attended group session    Patient's response to the group topic/interactions:  cooperative with task    Patient appeared to be Actively participating.        Client specific details:  Myrna participated and interacted appropriately with peers and staff in skills group. No triggers to use noted or discussed.

## 2023-08-04 ENCOUNTER — OFFICE VISIT (OUTPATIENT)
Dept: FAMILY MEDICINE | Facility: CLINIC | Age: 43
End: 2023-08-04
Payer: COMMERCIAL

## 2023-08-04 VITALS
OXYGEN SATURATION: 100 % | DIASTOLIC BLOOD PRESSURE: 83 MMHG | WEIGHT: 108.5 LBS | HEIGHT: 64 IN | HEART RATE: 70 BPM | TEMPERATURE: 97.4 F | SYSTOLIC BLOOD PRESSURE: 123 MMHG | RESPIRATION RATE: 13 BRPM | BODY MASS INDEX: 18.52 KG/M2

## 2023-08-04 DIAGNOSIS — Z78.9 NONIMMUNE TO HEPATITIS B VIRUS: ICD-10-CM

## 2023-08-04 DIAGNOSIS — F10.20 ALCOHOL USE DISORDER, SEVERE, DEPENDENCE (H): Primary | ICD-10-CM

## 2023-08-04 DIAGNOSIS — F17.210 CIGARETTE SMOKER: ICD-10-CM

## 2023-08-04 DIAGNOSIS — R74.8 ELEVATED LIVER ENZYMES: ICD-10-CM

## 2023-08-04 PROCEDURE — 99213 OFFICE O/P EST LOW 20 MIN: CPT | Mod: 25 | Performed by: FAMILY MEDICINE

## 2023-08-04 PROCEDURE — 90746 HEPB VACCINE 3 DOSE ADULT IM: CPT | Performed by: FAMILY MEDICINE

## 2023-08-04 PROCEDURE — 90471 IMMUNIZATION ADMIN: CPT | Performed by: FAMILY MEDICINE

## 2023-08-04 RX ORDER — FOLIC ACID 1 MG/1
1 TABLET ORAL DAILY
Qty: 90 TABLET | Refills: 3 | Status: SHIPPED | OUTPATIENT
Start: 2023-08-04

## 2023-08-04 RX ORDER — LANOLIN ALCOHOL/MO/W.PET/CERES
100 CREAM (GRAM) TOPICAL DAILY
Qty: 90 TABLET | Refills: 3 | Status: SHIPPED | OUTPATIENT
Start: 2023-08-04

## 2023-08-04 RX ORDER — MULTIVITAMIN,THERAPEUTIC
1 TABLET ORAL DAILY
Qty: 90 TABLET | Refills: 3 | Status: SHIPPED | OUTPATIENT
Start: 2023-08-04

## 2023-08-04 ASSESSMENT — PAIN SCALES - GENERAL: PAINLEVEL: NO PAIN (0)

## 2023-08-04 NOTE — PROGRESS NOTES
"  {PROVIDER CHARTING PREFERENCE:785910}    Les Norris is a 43 year old, presenting for the following health issues:  Establish Care (Establish new pcp) and Recheck Medication (Pt would like to get the naltrexone injection here)      8/4/2023    10:29 AM   Additional Questions   Roomed by Sol Silva       History of Present Illness       Reason for visit:  Finding primary care doctor    She eats 2-3 servings of fruits and vegetables daily.She consumes 2 sweetened beverage(s) daily.She exercises with enough effort to increase her heart rate 30 to 60 minutes per day.  She exercises with enough effort to increase her heart rate 4 days per week.   She is taking medications regularly.       {SUPERLIST (Optional):274537}  {additonal problems for provider to add (Optional):976193}  Pt would like to get the naltrexone injection done at this clinic in the future    Review of Systems   {ROS COMP (Optional):253195}      Objective    /83   Pulse 70   Temp 97.4  F (36.3  C) (Temporal)   Resp 13   Ht 1.614 m (5' 3.54\")   Wt 49.2 kg (108 lb 8 oz)   LMP 07/04/2023 (Within Days)   SpO2 100%   BMI 18.89 kg/m    Body mass index is 18.89 kg/m .  Physical Exam   {Exam List (Optional):319568}    {Diagnostic Test Results (Optional):734806}    {AMBULATORY ATTESTATION (Optional):385356}              "

## 2023-08-04 NOTE — PROGRESS NOTES
[address]  [place] [address]              Alistair Huggins, 1980 : was admitted for evaluation/treatment of chemical dependency at [facility].  She took part in these program(s):     ______ The Inpatient Program   ______ The Outpatient Program   ___X__ [facility]   ______ [facility] Outpatient         Date admitted:  7/7/23    Date discharged: 8/4/23     Type of discharge:     __X___ Satisfactory - Successfully Completed Evaluation/Treatment   ______ Discharged Without Completing Treatment   ______ Behavioral Discharge   ______ Transferred to [facility]   ______ Transferred to another type of service   ______ Left against medical advice (AMA) / Eloped               Clinicians: GISSEL Lynn & GISSEL Williamson     Date:  8/4/23      Time: 8:30pm

## 2023-08-04 NOTE — PROGRESS NOTES
[facility] Discharge Summary/Instructions     Patient: Alistair Huggins   MRN: [id number]  : 1980      Personal Safety/Management of Symptoms  - Contact local ER if symptoms increase or SI/HI ideation.     Call Crisis Lines As Needed  [facility] [phone]  Suicide Prevention [phone]  Crisis Connection [phone]   [facility] [phone]        [facility] [phone]                           National Suicide Prevention 1-516.571.2034           Abstinence/Relapse Prevention  * Take all medicines as prescribed, contact provider with concerns.   * Use coping skills from treatment: Call someone, attend meeting, sober event, sponsor,   safe place, read big book, exercise, read prevention plan. SEEK SUPPORT!    Community Resources/Supports   [facility] AA:  [phone] ( )  [place]  Alcoholics Anonymous : [phone]  Narcotics Anonymous : [address] ( [phone])  [place] [facility]: Free recovery resources.  [phone]     Discharge Recommendations:  -Admit/Complete IOP As Needed, Follow all recommendations.   -Schedule/Attend Psychiatrist/ Medical Provider as needed.  -Take Medications as prescribed.   -Schedule/Attend therapy sessions weekly.  -Attend 3+weekly support meetings (AA, NA, BLAYNE, GRAEME)  -Obtain/Maintain contact with a Sponsor/[place]/  -Schedule medical appointments as needed with provider if concerns arise.   -Continue to expand sober network, attend sober events/activities.    Patient Signature:    Date        Staff Signature:      Date

## 2023-08-04 NOTE — PROGRESS NOTES
"Assessment/Plan.    Alcohol dependence.  Early remission.  Continue Vivitrol.  Continue recovery meetings.  Restart MVI, folic acid, thiamine; recommend continuing these for 6-12 months.    Cigarette smoker.  Last cigarette about 4 days ago.  Continue nicotine gum.    Transaminitis.  Likely secondary to heavy alcohol use.  Patient denies prior history of liver disease.  Mother had alcohol-induced cirrhosis.  Patient otherwise denies family history of liver disease.  Recheck in a few months.    HepB non-immune.  Booster today.    Declines COVID and pneumococcal vaccines.    Follow up in 1 month.    Orders Placed This Encounter   Procedures    HEPATITIS B, ADULT (ENGERIX-B/RECOMBIVAX HB)     ----  Chief complaint: Establish Care (Establish new pcp) and Recheck Medication    Social History     Social History Narrative    Updated 8/4/2023:  Grew up in [place].  Moved to [place] around 2019.  Lives with  and 2 children (born around 2011, 2012).  Has cat, dog.     Alcohol use disorder.  Patient graduated from [facility] today.  She will return home with family.  This was her first time in treatment.    She is considering outpatient program.  For now, her plan is to attend AA meetings and to work with a sponsor.    Patient received her first dose of Vivitrol a few days ago.  She tolerated this.  She was taking oral naltrexone prior to this.    Exam  /83   Pulse 70   Temp 97.4  F (36.3  C) (Temporal)   Resp 13   Ht 1.614 m (5' 3.54\")   Wt 49.2 kg (108 lb 8 oz)   LMP 07/04/2023 (Within Days)   SpO2 100%   BMI 18.89 kg/m    Patient's last menstrual period was 07/04/2023 (within days).  "

## 2023-08-06 PROBLEM — Z23 NEED FOR HEPATITIS B VACCINATION: Status: RESOLVED | Noted: 2023-07-21 | Resolved: 2023-08-06

## 2023-08-06 PROBLEM — F17.210 CIGARETTE SMOKER: Status: ACTIVE | Noted: 2023-08-06

## 2023-08-06 PROBLEM — F19.20 CHEMICAL DEPENDENCY (H): Status: RESOLVED | Noted: 2023-07-07 | Resolved: 2023-08-06
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
## 2023-08-11 DIAGNOSIS — F17.200 NICOTINE DEPENDENCE: ICD-10-CM

## 2023-08-11 NOTE — TELEPHONE ENCOUNTER
Medication Question or Refill        What medication are you calling about (include dose and sig)?: nicotine (NICORETTE) 4 MG gum     Preferred Pharmacy:   [facility] - [place], [place] - [address]  [address]  [facility] [address]  Phone: [phone] Fax: [phone]      Controlled Substance Agreement on file:   CSA -- Patient Level:    CSA: None found at the patient level.       Who prescribed the medication?: PCP    Do you need a refill? Yes    When did you use the medication last? ONGOING    Patient offered an appointment? No    Do you have any questions or concerns?  No      Could we send this information to you in [employer] or would you prefer to receive a phone call?:   Patient would prefer a phone call   Okay to leave a detailed message?: Yes at Cell number on file:    Telephone Information:   Mobile [phone]

## 2023-08-11 NOTE — TELEPHONE ENCOUNTER
"Requested Prescriptions   Pending Prescriptions Disp Refills    nicotine polacrilex (NICORETTE) 4 MG gum 120 each 1     Sig: Place 1 each (4 mg) inside cheek every hour as needed for smoking cessation       Partial Cholinergic Nicotinic Agonist Agents Passed - 8/11/2023  2:28 PM        Passed - Blood pressure under 140/90 in past 12 months     BP Readings from Last 3 Encounters:   08/04/23 123/83   08/01/23 107/72   07/07/23 115/71                 Passed - Recent (12 mo) or future (30 days) visit within the authorizing provider's specialty     Patient has had an office visit with the authorizing provider or a provider within the authorizing providers department within the previous 12 mos or has a future within next 30 days. See \"Patient Info\" tab in inbasket, or \"Choose Columns\" in Meds & Orders section of the refill encounter.              Passed - Medication is active on med list        Passed - Patient is 18 years of age or older        Passed - Patient is not pregnant        Passed - No positive pregnancy test on file in past 12 months            Prescription approved per [facility] Refill Protocol.     Annabelle Whitney RN  [facility]   " .

## 2023-08-22 ENCOUNTER — VIRTUAL VISIT (OUTPATIENT)
Dept: ADDICTION MEDICINE | Facility: CLINIC | Age: 43
End: 2023-08-22
Payer: COMMERCIAL

## 2023-08-22 DIAGNOSIS — Z23 NEED FOR HEPATITIS B VACCINATION: ICD-10-CM

## 2023-08-22 DIAGNOSIS — F10.20 ALCOHOL USE DISORDER, SEVERE, DEPENDENCE (H): Primary | ICD-10-CM

## 2023-08-22 DIAGNOSIS — R74.8 ELEVATED LIVER ENZYMES: ICD-10-CM

## 2023-08-22 PROCEDURE — 99214 OFFICE O/P EST MOD 30 MIN: CPT | Mod: VID

## 2023-08-22 ASSESSMENT — PAIN SCALES - GENERAL: PAINLEVEL: NO PAIN (0)

## 2023-08-22 NOTE — PROGRESS NOTES
Virtual Visit Details    Type of service:  Video Visit   Video Start Time:  0956  Video End Time: 1006    Originating Location (pt. Location): Home    Distant Location (provider location):  Off-site  Platform useAmWelld for Video Visit:

## 2023-08-22 NOTE — PROGRESS NOTES
[facility]    A/P                                                    ASSESSMENT/PLAN  1. Alcohol use disorder, severe, dependence (H)  -No use since July 4.  -Recovery going well.   -Continue with 12 step meetings and ongoing therapy  Continue with monthly CAPONE naltrexone  - Hepatic function panel; Future  -Follow up as needed and in three months    2. Elevated liver enzymes  -Recheck liver enzymes at next injection visit    3. Need for hepatitis B vaccination  -Established primary care  -received at primary care clinic      Aug 22, 2023  - continue monthly vivitrol and psychosocial interventions  -recheck LFT's at next injection visit       Last encounter A/P  ASSESSMENT/PLAN     1. Alcohol use disorder, Severe  2. Alcohol abuse  - naltrexone (DEPADE/REVIA) 50 MG tablet; Take 1 tablet (50 mg) by mouth daily  Dispense: 30 tablet; Refill: 0.  Risk/ benefit discussion regarding low potential of increasing liver function labs. Will monitor LFT's with ongoing use of Naltrexone.   -Tolerating oral naltrexone. Interested in initiating Vivitrol  -Continue with [facility] and follow ongoing recommendations  -Obtain sponsor,   -Continue to abstain from alcohol   -Follow up in one month virtual and monthly vivitrol injections        3. Elevated liver enzymes  - Several month period of binge drinking    - Hepatic panel (Albumin, ALT, AST, Bili, Alk Phos, TP); Future  - Acute hepatitis panel; Future  - complete Abstainance from all alcohol use.   -Monitor LFT's     4. Need for Hepatitis B vaccination  -Will follow up with PCP 8/4/2023      PDMP Review         Value Time User    State PDMP site checked  Yes 7/14/2023  1:15 PM Regina Joyner NP              RTC  No follow-ups on file.      Counseled the patient on the importance of having a recovery program in addition to medication to manage recovery.  Components include avoiding isolating, having willingness to change, avoiding  "triggers and managing cravings. Encouraged having some type of sober network and practicing honesty with trusted support person(s). Encouraged other services such as counseling, 12 step or other self-help organizations.            SUBJECTIVE                                                    Alistair Huggins is a 43 year old female who presents to clinic today for follow up    Visit performed In Person, face-to-face      MIGUEL ANGEL History:     MIGUEL ANGEL History:  Substance Use History:   \"Have you ever had any history with [...] use?\" And \"When was your last use?  ALCOHOL - last use July 3 or 4, detox. Typical pattern escalating since separationl. 5 drinks every evening for several months.   CANNABIS - no  PRESCRIPTION STIMULANTS (includes Ritalin, Adderall, Vyvanse) - no  COCAINE/CRACK - no  METH/AMPHETAMINES (includes ecstacy, MDMA/wilfrid) - no  OPIATES - no  BENZODIAZEPINES (includes Ativan, Klonopin, Xanax) - no  KRATOM (mild opioid and stimulant effects) - no  KETAMINE - no  HALLUCINOGENS (includes DXM) - no  BEHAVIORAL (Gambling, Eating d/o, Compulsivity) - no  History of treatment - no  NICOTINE  Cigarettes: no  Chew/snus: no  Vaping: no  Past NRT/medication use: nicorette 4 mg      Previous withdrawal treatment episodes (e.g. detox): no  Previous MIGUEL ANGEL treatment programs: no  Hospitalizations or overdose: no  Medical complications from substance use: Elevated LFT's  IV Drug use?: no  Previous Medication for Addiction Tx: no  Longest period of full abstinence: off and on months, longest sustained 1 year, and pregnancy  Activities that have previously supported abstinence: \"just didn't use\"  Current Recovery Activities: treatment at [facility], outpatient counseling, therapy, and aa        Recent HPI Details: from 7/21/23 visit     Met with Myrna for follow up visit for pharmacotherapy management of alcohol use disorder. Has been taking naltrexone for one week, no side effects, no cravings.  (But difficult to know " cravings in current treatment setting). Feeling positive and hopeful with recovery process.  Is set to graduate August 3 or 4th. Myrna is working on establishing a therapist for post treatment, she's working on getting a sponsor, planning on continuing with 12 step recovery work, [facility]  and attend marriage counseling.  Would like to start vivitrol while at [employer].  Will continue to follow up with addiction medicine virtually with vivitrol injections at the [facility].        TODAY'S VISIT  HPI Aug 22, 2023  - graduated [employer] August 8 and has been attending  meetings. Has established care with a couple different therapist, and established primary care.  Vivitrol going well, no use, no side effects, no cravings. Has follow up appointments scheduled.  Staying in her second home.         OBJECTIVE  PHYSICAL EXAM:      GENERAL: healthy, alert and no distress  EYES: Eyes grossly normal to inspection, PERRL and conjunctivae and sclerae normal  RESP: No respiratory distress  MENTAL STATUS EXAM  Appearance/Behavior: No appearant distress  Speech: Normal  Mood/Affect: normal affect  Insight: Adequate      PHQ-9 Score:       7/7/2023    10:00 AM 7/14/2023    12:35 PM 7/17/2023     9:00 AM   PHQ   PHQ-9 Total Score 8 1 2   Q9: Thoughts of better off dead/self-harm past 2 weeks Several days Not at all Not at all       JESSICA-7 Score:      3/31/2023     8:01 AM 7/7/2023    10:00 AM 7/17/2023     9:00 AM   JESSICA-7 SCORE   Total Score 2 (minimal anxiety)     Total Score 2 6 2       LABS (may not contain today's labs)                                                        Today's lab data  No results found for any visits on 08/22/23.        HISTORY                                                    Problem list reviewed & adjusted, as indicated.  Patient Active Problem List   Diagnosis    Alcohol use disorder, severe, dependence (H)    Elevated liver enzymes    Cigarette smoker          MEDICATION LIST (prior to visit)  folic acid (FOLVITE) 1 MG tablet, Take 1 tablet (1 mg) by mouth daily  multivitamin, therapeutic (THERA-VIT) TABS tablet, Take 1 tablet by mouth daily  nicotine polacrilex (NICORETTE) 4 MG gum, Place 1 each (4 mg) inside cheek every hour as needed for smoking cessation  thiamine (B-1) 100 MG tablet, Take 1 tablet (100 mg) by mouth daily  triamcinolone (KENALOG) 0.1 % external cream, Apply topically 2 times daily Apply to affected area    naltrexone (VIVITROL) injection 380 mg  Self Administer Medications: Behavioral Services        MEDICATION LIST (after visit)  Current Outpatient Medications   Medication    folic acid (FOLVITE) 1 MG tablet    multivitamin, therapeutic (THERA-VIT) TABS tablet    nicotine polacrilex (NICORETTE) 4 MG gum    thiamine (B-1) 100 MG tablet    triamcinolone (KENALOG) 0.1 % external cream     Current Facility-Administered Medications   Medication    naltrexone (VIVITROL) injection 380 mg     Facility-Administered Medications Ordered in Other Visits   Medication    Self Administer Medications: Behavioral Services         No Known Allergies    Additional MDM Details:  none    Regina Joyner NP  [facility]  [phone]

## 2023-08-22 NOTE — PATIENT INSTRUCTIONS
Patient Education   Addiction Medicine  What to Expect  Here's what to expect from our Addiction Medicine program.  About Addiction Medicine  Addiction Medicine clinics help you with substance use problems. You set your own goals. We try to help you reach your goals. Your care plan can include:  Medicine  Creating a recovery plan  Helping you find local resources  Helping with treatment options  Clinic phone number and addresses  Clinic Phone: [phone]  Mental Health and Addiction Clinic  [facility]  [address], [address]  [place] [address]  [facility]  [address], [address]  [place] [address]  Walk-in services  We offer walk-in care for patients at the Recovery Clinic. This is only for patients with Opioid Use Disorder (OUD). Anyone with OUD is welcome. Our providers will refer you to the Recovery Clinic if you're struggling to keep up with your medicines or appointments.  Recovery Clinic ([facility])  [address], [address]  [place] [address]  Phone: [phone]  The Recovery Clinic is open for walk-ins Monday to Friday 9 a.m. to 11:30 a.m. and 12:30 p.m. to 3 p.m.  How it works  Come to your visits every time. The treatment works better when you do.   You can have as many visits as you need. When you're better, we'll refer you back to being cared for by your family doctor.   If you need it, we'll send you to doctors, psychiatrists, therapists, and other providers. We focus on treating addiction. We don't treat other problems, like managing other medicines or non-addiction issues.  About visits  Urine drug testing  We'll often test your pee (urine) for drugs. This is the only way we can know for sure whether or not you're using drugs. It helps us treat you without judgement.   Suboxone (buprenorphine)  If you're taking buprenorphine, you'll have a lot of visits at first. If your problem is getting worse, or you're  "using substances, we may schedule you for extra visits.   Cancelling visits  If you can't come to your visit, please call us right away at [phone]. If you don't cancel at least 24 hours (1 full day) before your visit, that's \"late cancellation.\"  Being late to visits  If you come late, you may not be seen. This will count as a \"no-show.\"  Please call the clinic if you're running late. This will help us plan, but it doesn't mean you'll be seen.   Being late is:  More than 15 minutes late for a return visit.  More than 30 minutes late for your first visit.  If you cancel late or don't show up 2 times within 6 months, we may transfer you to another clinic.   Getting help between visits  If you need help between visits, you can call us Monday to Friday from 8 a.m. to 4:30 p.m. at [phone]. You can also send us a message on [employer].  Medicine refills  If you miss or cancel a visit, you can still ask for a refill. But we can only refill your medicines if you've made a new appointment.  Please call your pharmacy for medicine refills. If you have a question about your refill, call us at [phone].  It takes up to 2 business days to refill your drugs. Let us know 2 to 3 days before you run out. Don't call more than 1 week before you run out. That's too early.   Please make sure we have your right phone number.  If we have a problem with your refill, we'll call you. If we call you, please call us back right away. If you don't, you may not get your medicines quickly.   Call your pharmacy to find out if your medicines are ready.   Keep your medicines in a safe place. Keep them away from pets and children. If your medicines are lost or stolen, we usually don't replace them. We recommend you file a police report if your medicines are stolen. Your insurance may not pay for early refills, even if you have a prescription.  Forms  Please give us at least 3 business days to fill out any forms. Bring the forms to your " visits if you can. We may refer you to other members of your care team to complete the forms.   Emergency care   Call 911 or go to the nearest emergency room if your life or someone else's life is in danger.  Call 988 anytime for the Suicide and Crisis Lifeline.  If you need care when we're closed, call your family doctor to see if they can help. You can also go to urgent care or an emergency room. [facility] emergency rooms may be able to give you buprenorphine or other medicine refills.  Thank you for choosing us for your care.  For informational purposes only. Not to replace the advice of your health care provider. Copyright   2023 [facility]. All rights reserved. [employer] 616550 - REV 05/23.    Recheck liver enzymes at next injection appointment  [email or web address]

## 2023-08-22 NOTE — NURSING NOTE
Is the patient currently in the state of [place]? YES    Visit mode:VIDEO    If the visit is dropped, the patient can be reconnected by: VIDEO VISIT: Text to cell phone:   Telephone Information:   Mobile [phone]       Will anyone else be joining the visit? NO  (If patient encounters technical issues they should call [phone]    How would you like to obtain your AVS? MyChart    Are changes needed to the allergy or medication list? No    Reason for visit: No chief complaint on file.    Michelle June VVF    Patient found a PCP

## 2023-09-06 ENCOUNTER — INFUSION THERAPY VISIT (OUTPATIENT)
Dept: INFUSION THERAPY | Facility: CLINIC | Age: 43
End: 2023-09-06
Payer: COMMERCIAL

## 2023-09-06 VITALS
RESPIRATION RATE: 16 BRPM | OXYGEN SATURATION: 100 % | DIASTOLIC BLOOD PRESSURE: 73 MMHG | HEART RATE: 73 BPM | SYSTOLIC BLOOD PRESSURE: 109 MMHG

## 2023-09-06 DIAGNOSIS — F10.20 ALCOHOL USE DISORDER, SEVERE, DEPENDENCE (H): Primary | ICD-10-CM

## 2023-09-06 PROCEDURE — 96372 THER/PROPH/DIAG INJ SC/IM: CPT

## 2023-09-06 PROCEDURE — 250N000011 HC RX IP 250 OP 636

## 2023-09-06 RX ORDER — HEPARIN SODIUM,PORCINE 10 UNIT/ML
5-20 VIAL (ML) INTRAVENOUS DAILY PRN
Status: CANCELLED | OUTPATIENT
Start: 2023-09-26

## 2023-09-06 RX ORDER — ALBUTEROL SULFATE 0.83 MG/ML
2.5 SOLUTION RESPIRATORY (INHALATION)
Status: CANCELLED | OUTPATIENT
Start: 2023-09-26

## 2023-09-06 RX ORDER — HEPARIN SODIUM (PORCINE) LOCK FLUSH IV SOLN 100 UNIT/ML 100 UNIT/ML
5 SOLUTION INTRAVENOUS
Status: CANCELLED | OUTPATIENT
Start: 2023-09-26

## 2023-09-06 RX ORDER — METHYLPREDNISOLONE SODIUM SUCCINATE 125 MG/2ML
125 INJECTION, POWDER, LYOPHILIZED, FOR SOLUTION INTRAMUSCULAR; INTRAVENOUS
Status: CANCELLED
Start: 2023-09-26

## 2023-09-06 RX ORDER — DIPHENHYDRAMINE HYDROCHLORIDE 50 MG/ML
50 INJECTION INTRAMUSCULAR; INTRAVENOUS
Status: CANCELLED
Start: 2023-09-26

## 2023-09-06 RX ORDER — ALBUTEROL SULFATE 90 UG/1
1-2 AEROSOL, METERED RESPIRATORY (INHALATION)
Status: CANCELLED
Start: 2023-09-26

## 2023-09-06 RX ORDER — EPINEPHRINE 1 MG/ML
0.3 INJECTION, SOLUTION INTRAMUSCULAR; SUBCUTANEOUS EVERY 5 MIN PRN
Status: CANCELLED | OUTPATIENT
Start: 2023-09-26

## 2023-09-06 RX ADMIN — NALTREXONE 380 MG: KIT at 10:24

## 2023-09-06 NOTE — PATIENT INSTRUCTIONS
Dear Alistair Huggins    Thank you for choosing [facility] ([facility]) for your infusion.  The following information is a summary of our appointment as well as important reminders.      If you are a transplant patient and require transplant education, please click on this link: [email or web address].    We look forward in seeing you on your next appointment here at [facility] ([facility]).  Please don t hesitate to call us at [phone] to reschedule any of your appointments or to speak with one of the [facility] registered nurses.  It was a pleasure taking care of you today.    Sincerely,    [facility]  [facility]  [address] [place] [address]  [address]  Phone:  [phone]

## 2023-09-06 NOTE — PROGRESS NOTES
Infusion Nursing Note:  Alistair Huggins presents today for Patient presents with:  Injections: Vivitrol  Patient arrived 30 minutes late for her appointment.    Patient seen by provider today: No   present during visit today: Not Applicable.    Note: Patient identification verified by name and date of birth.  Assessment completed.  Vitals recorded in Doc Flowsheets.  Patient was provided with education regarding medication/procedure and possible side effects.  Patient verbalized understanding.    During today's [facility] appointment, orders from Regina Min NP  were completed.  Frequency: every 28 days Pre meds none per orders/patient request.  .    Message sent to scheduling to make more appointments.    Intravenous Access:  No Intravenous access/labs at this visit.    Treatment Conditions:  Not Applicable.      Post Infusion Assessment:  Patient tolerated injection without incident.       Discharge Plan:   [facility].  Patient will return in 1 mo  for next appointment.   Patient discharged in stable condition accompanied by: self.  Departure Mode: Ambulatory.      Esperanza Medina RN

## 2023-10-16 ENCOUNTER — INFUSION THERAPY VISIT (OUTPATIENT)
Dept: INFUSION THERAPY | Facility: CLINIC | Age: 43
End: 2023-10-16
Payer: COMMERCIAL

## 2023-10-16 VITALS
RESPIRATION RATE: 16 BRPM | SYSTOLIC BLOOD PRESSURE: 117 MMHG | HEART RATE: 85 BPM | DIASTOLIC BLOOD PRESSURE: 77 MMHG | OXYGEN SATURATION: 100 % | TEMPERATURE: 98.2 F

## 2023-10-16 DIAGNOSIS — F10.20 ALCOHOL USE DISORDER, SEVERE, DEPENDENCE (H): Primary | ICD-10-CM

## 2023-10-16 PROCEDURE — 250N000011 HC RX IP 250 OP 636

## 2023-10-16 PROCEDURE — 96372 THER/PROPH/DIAG INJ SC/IM: CPT

## 2023-10-16 RX ORDER — METHYLPREDNISOLONE SODIUM SUCCINATE 125 MG/2ML
125 INJECTION, POWDER, LYOPHILIZED, FOR SOLUTION INTRAMUSCULAR; INTRAVENOUS
Status: CANCELLED
Start: 2023-11-01

## 2023-10-16 RX ORDER — HEPARIN SODIUM (PORCINE) LOCK FLUSH IV SOLN 100 UNIT/ML 100 UNIT/ML
5 SOLUTION INTRAVENOUS
Status: CANCELLED | OUTPATIENT
Start: 2023-11-01

## 2023-10-16 RX ORDER — ALBUTEROL SULFATE 0.83 MG/ML
2.5 SOLUTION RESPIRATORY (INHALATION)
Status: CANCELLED | OUTPATIENT
Start: 2023-11-01

## 2023-10-16 RX ORDER — ALBUTEROL SULFATE 90 UG/1
1-2 AEROSOL, METERED RESPIRATORY (INHALATION)
Status: CANCELLED
Start: 2023-11-01

## 2023-10-16 RX ORDER — HEPARIN SODIUM,PORCINE 10 UNIT/ML
5-20 VIAL (ML) INTRAVENOUS DAILY PRN
Status: CANCELLED | OUTPATIENT
Start: 2023-11-01

## 2023-10-16 RX ORDER — DIPHENHYDRAMINE HYDROCHLORIDE 50 MG/ML
50 INJECTION INTRAMUSCULAR; INTRAVENOUS
Status: CANCELLED
Start: 2023-11-01

## 2023-10-16 RX ORDER — EPINEPHRINE 1 MG/ML
0.3 INJECTION, SOLUTION INTRAMUSCULAR; SUBCUTANEOUS EVERY 5 MIN PRN
Status: CANCELLED | OUTPATIENT
Start: 2023-11-01

## 2023-10-16 RX ADMIN — NALTREXONE 380 MG: KIT at 13:31

## 2023-10-16 NOTE — PROGRESS NOTES
Patient presents to the [facility] for Vivitrol.  Order written by Regina Joyner NP was completed today. Name and  verified with patient. See MAR for medication details. Medication was divided into 1 syringes by pharmacy and given in the following sites Left Ventral gluteal. Patient tolerated injection well and was discharged to home.

## 2023-10-25 ENCOUNTER — OFFICE VISIT (OUTPATIENT)
Dept: MIDWIFE SERVICES | Facility: CLINIC | Age: 43
End: 2023-10-25
Payer: COMMERCIAL

## 2023-10-25 VITALS
TEMPERATURE: 97.2 F | HEART RATE: 84 BPM | SYSTOLIC BLOOD PRESSURE: 121 MMHG | BODY MASS INDEX: 19.15 KG/M2 | WEIGHT: 110 LBS | DIASTOLIC BLOOD PRESSURE: 75 MMHG

## 2023-10-25 DIAGNOSIS — Z13.29 SCREENING FOR THYROID DISORDER: ICD-10-CM

## 2023-10-25 DIAGNOSIS — Z01.419 ENCOUNTER FOR WELL WOMAN EXAM WITH ROUTINE GYNECOLOGICAL EXAM: Primary | ICD-10-CM

## 2023-10-25 DIAGNOSIS — Z13.0 SCREENING FOR IRON DEFICIENCY ANEMIA: ICD-10-CM

## 2023-10-25 DIAGNOSIS — Z13.1 SCREENING FOR DIABETES MELLITUS: ICD-10-CM

## 2023-10-25 DIAGNOSIS — Z13.220 SCREENING FOR CHOLESTEROL LEVEL: ICD-10-CM

## 2023-10-25 LAB
CHOLEST SERPL-MCNC: 164 MG/DL
ERYTHROCYTE [DISTWIDTH] IN BLOOD BY AUTOMATED COUNT: 11.7 % (ref 10–15)
HBA1C MFR BLD: 5.3 % (ref 0–5.6)
HCT VFR BLD AUTO: 36.9 % (ref 35–47)
HDLC SERPL-MCNC: 73 MG/DL
HGB BLD-MCNC: 12 G/DL (ref 11.7–15.7)
MCH RBC QN AUTO: 30 PG (ref 26.5–33)
MCHC RBC AUTO-ENTMCNC: 32.5 G/DL (ref 31.5–36.5)
MCV RBC AUTO: 92 FL (ref 78–100)
PLATELET # BLD AUTO: 250 10E3/UL (ref 150–450)
RBC # BLD AUTO: 4 10E6/UL (ref 3.8–5.2)
TSH SERPL DL<=0.005 MIU/L-ACNC: 1.46 UIU/ML (ref 0.3–4.2)
WBC # BLD AUTO: 3.8 10E3/UL (ref 4–11)

## 2023-10-25 PROCEDURE — 99396 PREV VISIT EST AGE 40-64: CPT | Performed by: ADVANCED PRACTICE MIDWIFE

## 2023-10-25 PROCEDURE — 82465 ASSAY BLD/SERUM CHOLESTEROL: CPT | Performed by: ADVANCED PRACTICE MIDWIFE

## 2023-10-25 PROCEDURE — 36415 COLL VENOUS BLD VENIPUNCTURE: CPT | Performed by: ADVANCED PRACTICE MIDWIFE

## 2023-10-25 PROCEDURE — 84443 ASSAY THYROID STIM HORMONE: CPT | Performed by: ADVANCED PRACTICE MIDWIFE

## 2023-10-25 PROCEDURE — 85027 COMPLETE CBC AUTOMATED: CPT | Performed by: ADVANCED PRACTICE MIDWIFE

## 2023-10-25 PROCEDURE — 83718 ASSAY OF LIPOPROTEIN: CPT | Performed by: ADVANCED PRACTICE MIDWIFE

## 2023-10-25 PROCEDURE — 83036 HEMOGLOBIN GLYCOSYLATED A1C: CPT | Performed by: ADVANCED PRACTICE MIDWIFE

## 2023-10-25 NOTE — PROGRESS NOTES
"Alistair is a 43 year old  female who presents for annual exam.     Menses are irregular and normal lMenses flow: spotty,normal.  No LMP recorded. (Menstrual status: Irregular Periods).. Using none for contraception.  She is not currently considering pregnancy.  Besides routine health maintenance,   Would like to discuss her menses.  Since 2023 she has noted only irregular spotting.  Took a pregnancy test 3 weeks ago and was negative.   has a vasectomy  Pt is being treated for alcohol use with vivitrol injections which started about the same time as her periods became spotting   GYNECOLOGIC HISTORY:  Menarche: 15  Age at first intercourse: 19 Number of lifetime partners: 25  Alistair is sexually active with 1male partner(s) and is currently in monogamous relationship.    History sexually transmitted infections:No STD history  STI testing offered?  Declined  DARCY exposure: No  History of abnormal Pap smear: NO - age 30- 65 PAP every 3 years recommended  Family history of breast CA: No  Family history of uterine/ovarian CA: No    Family history of colon CA: No    HEALTH MAINTENANCE:  Cholesterol: (  Cholesterol   Date Value Ref Range Status   2022 199 <200 mg/dL Final    History of abnormal lipids: No  Mammo:  . History of abnormal Mammo: No.  Regular Self Breast Exams: Yes  Calcium/Vitamin D intake: source:  dairy, dietary supplement(s) Adequate? Yes  TSH: (No results found for: \"TSH\" )  Pap; (No results found for: \"PAP\" )    HISTORY:  OB History    Para Term  AB Living   2 0 0 0 0 2   SAB IAB Ectopic Multiple Live Births   0 0 0 0 0      # Outcome Date GA Lbr Parmjit/2nd Weight Sex Delivery Anes PTL Lv   2             1               History reviewed. No pertinent past medical history.  Past Surgical History:   Procedure Laterality Date    WISDOM TOOTH EXTRACTION       Family History   Problem Relation Age of Onset    Kidney Disease Mother 50        kidney failure    " Cirrhosis Mother     Alcoholism Mother     Diabetes Father     Cerebrovascular Disease Paternal Grandmother      Social History     Socioeconomic History    Marital status:    Tobacco Use    Smoking status: Former     Types: Cigarettes    Smokeless tobacco: Never   Vaping Use    Vaping Use: Never used   Substance and Sexual Activity    Alcohol use: Not Currently    Drug use: Never    Sexual activity: Yes     Partners: Male     Birth control/protection: None, Male Surgical   Social History Narrative    Updated 8/4/2023:  Grew up in [place].  Moved to [place] around 2019.  Lives with  and 2 children (born around 2011, 2012).  Has cat, dog.        Name is pronounced like Dool-say.       Current Outpatient Medications:     folic acid (FOLVITE) 1 MG tablet, Take 1 tablet (1 mg) by mouth daily, Disp: 90 tablet, Rfl: 3    multivitamin, therapeutic (THERA-VIT) TABS tablet, Take 1 tablet by mouth daily, Disp: 90 tablet, Rfl: 3    nicotine polacrilex (NICORETTE) 4 MG gum, Place 1 each (4 mg) inside cheek every hour as needed for smoking cessation, Disp: 120 each, Rfl: 1    thiamine (B-1) 100 MG tablet, Take 1 tablet (100 mg) by mouth daily, Disp: 90 tablet, Rfl: 3    triamcinolone (KENALOG) 0.1 % external cream, Apply topically 2 times daily Apply to affected area, Disp: 45 g, Rfl: 2    Current Facility-Administered Medications:     naltrexone (VIVITROL) injection 380 mg, 380 mg, Intramuscular, Once, Regina Joyner NP    Facility-Administered Medications Ordered in Other Visits:     Self Administer Medications: Behavioral Services, , Does not apply, See Admin Instructions, Chandra Richards MD   No Known Allergies    Past medical, surgical, social and family history were reviewed and updated in EPIC.    ROS:   C:     NEGATIVE for fever, chills, change in weight  I:       NEGATIVE for worrisome rashes, moles or lesions  E:     NEGATIVE for vision changes or irritation  E/M: NEGATIVE for ear, mouth and  throat problems  R:     NEGATIVE for significant cough or SOB  CV:   NEGATIVE for chest pain, palpitations or peripheral edema  GI:     NEGATIVE for nausea, abdominal pain, heartburn, or change in bowel habits  :   NEGATIVE for frequency, dysuria, hematuria, vaginal discharge, or irregular bleeding  M:     NEGATIVE for significant arthralgias or myalgia  N:      NEGATIVE for weakness, dizziness or paresthesias  E:      NEGATIVE for temperature intolerance, skin/hair changes  P:      NEGATIVE for changes in mood or affect.    EXAM:  /75   Pulse 84   Temp 97.2  F (36.2  C)   Wt 49.9 kg (110 lb)   BMI 19.15 kg/m     BMI: Body mass index is 19.15 kg/m .  Constitutional: healthy, alert and no distress  Head: Normocephalic. No masses, lesions, tenderness or abnormalities  Neck: Neck supple. Trachea midline. No adenopathy. Thyroid symmetric, normal size.   Cardiovascular: RRR.   Respiratory: Negative.   Breast: Breasts reveal mild symmetric fibrocystic densities, but there are no dominant, discrete, fixed or suspicious masses found.  Gastrointestinal: Abdomen soft, non-tender, non-distended. No masses, organomegaly.  :  Vulva:  No external lesions, normal female hair distribution, no inguinal adenopathy.    Urethra:  Midline, non-tender, well supported, no discharge  Vagina:  Moist, pink, no abnormal discharge, no lesions  Uterus:  Normal size, anteverted  , non-tender, freely mobile  Ovaries:  No masses appreciated, non-tender, mobile  Rectal Exam: deferred  Musculoskeletal: extremities normal  Skin: no suspicious lesions or rashes  Psychiatric: Affect appropriate, cooperative,mentation appears normal.     COUNSELING:   Reviewed preventive health counseling, as reflected in patient instructions       Regular exercise       Healthy diet/nutrition       (Emily)menopause management   reports that she has quit smoking. Her smoking use included cigarettes. She has never used smokeless tobacco.    Body mass index  is 19.15 kg/m .    FRAX Risk Assessment    ASSESSMENT:  43 year old female with satisfactory annual exam  (Z01.419) Encounter for well woman exam with routine gynecological exam  (primary encounter diagnosis)  Comment:   Plan: Discuss perimenopause and most common symptoms.  If they occur we have ways that can help.  Given resources on perimenopause     (Z13.29) Screening for thyroid disorder  Comment:   Plan: TSH with free T4 reflex            (Z13.220) Screening for cholesterol level  Comment:   Plan: Cholesterol, HDL cholesterol            (Z13.1) Screening for diabetes mellitus  Comment:   Plan: HEMOGLOBIN A1C - Z13.1            (Z13.0) Screening for iron deficiency anemia  Comment:   Plan: CBC with platelets          Due for pap in 2025.  Is in process of foliow up imaging for her mammogram.  Discussed future recommendation for colonoscopy at 45.  Discussed perimenopause and given resources.  RTC 1 yr for annual, sooner with concerns.  MALIHA DiehlM

## 2023-10-25 NOTE — PATIENT INSTRUCTIONS
Deaayla Norris,    It was nice to meet you today.    For menopause resources take a look at Hot and Bothered, or The Menopause Manifesto     Podcasts:  Angela Shaw 'From PMS to Menopause'  Clarissa Castro 'You're not Broken'    If you have any questions please let me know.    MALIHA Diehl RAYO     A Healthy Lifestyle: Care Instructions  Your Care Instructions    A healthy lifestyle can help you feel good, stay at a healthy weight, and have plenty of energy for both work and play. A healthy lifestyle is something you can share with your whole family.  A healthy lifestyle also can lower your risk for serious health problems, such as high blood pressure, heart disease, and diabetes.    You can follow a few steps listed below to improve your health and the health of your family.    Follow-up care is a key part of your treatment and safety. Be sure to make and go to all appointments, and call your doctor if you are having problems. It's also a good idea to know your test results and keep a list of the medicines you take.    How can you care for yourself at home?            Do not eat too much sugar, fat, or fast foods. You can still have dessert and treats now and then. The goal is moderation.            Start small to improve your eating habits. Pay attention to portion sizes, drink less juice and soda pop, and eat more fruits and vegetables.            Eat a healthy amount of food. A 3-ounce serving of meat, for example, is about the size of a deck of cards. Fill the rest of your plate with vegetables and whole grains.            Limit the amount of soda and sports drinks you have every day. Drink more water when you are thirsty.            Eat at least 5 servings of fruits and vegetables every day. It may seem like a lot, but it is not hard to reach this goal. A serving or helping is 1 piece of fruit, 1 cup of vegetables, or 2 cups of leafy, raw vegetables. Have an apple or some carrot sticks as an afternoon  snack instead of a candy bar. Try to have fruits and/or vegetables at every meal.            Make exercise part of your daily routine. You may want to start with simple activities, such as walking, bicycling, or slow swimming. Try to be active 30 to 60 minutes every day. You do not need to do all 30 to 60 minutes all at once. For example, you can exercise 3 times a day for 10 or 20 minutes. Moderate exercise is safe for most people, but it is always a good idea to talk to your doctor before starting an exercise program.            Keep moving. Mow the lawn, work in the garden, or clean your house. Take the stairs instead of the elevator at work.    Quit smoking            If you smoke, quit. People who smoke have an increased risk for heart attack, stroke, cancer, and other lung illnesses. Quitting is hard, but there are ways to boost your chance of quitting tobacco for good.            Use nicotine gum, patches, or lozenges.            Ask your doctor about stop-smoking programs and medicines.            Keep trying.  In addition to reducing your risk of diseases in the future, you will notice some benefits soon after you stop using tobacco. If you have shortness of breath or asthma symptoms, they will likely get better within a few weeks after you quit.            Limit how much alcohol you drink. Moderate amounts of alcohol (up to 2 drinks a day for men, 1 drink a day for women) are okay. But drinking too much can lead to liver problems, high blood pressure, and other health problems.    Family health  If you have a family, there are many things you can do together to improve your health.            Eat meals together as a family as often as possible.            Eat healthy foods. This includes fruits, vegetables, lean meats and dairy, and whole grains.            Include your family in your fitness plan. Most people think of activities such as jogging or tennis as the way to fitness, but there are many ways you  and your family can be more active. Anything that makes you breathe hard and gets your heart pumping is exercise. Here are some tips:            Walk to do errands or to take your child to school or the bus.            Go for a family bike ride after dinner instead of watching TV.

## 2023-11-13 ENCOUNTER — INFUSION THERAPY VISIT (OUTPATIENT)
Dept: INFUSION THERAPY | Facility: CLINIC | Age: 43
End: 2023-11-13
Payer: COMMERCIAL

## 2023-11-13 VITALS
TEMPERATURE: 98.4 F | OXYGEN SATURATION: 100 % | DIASTOLIC BLOOD PRESSURE: 73 MMHG | RESPIRATION RATE: 16 BRPM | HEART RATE: 80 BPM | SYSTOLIC BLOOD PRESSURE: 118 MMHG

## 2023-11-13 DIAGNOSIS — F10.20 ALCOHOL USE DISORDER, SEVERE, DEPENDENCE (H): Primary | ICD-10-CM

## 2023-11-13 PROCEDURE — 250N000011 HC RX IP 250 OP 636

## 2023-11-13 PROCEDURE — 96372 THER/PROPH/DIAG INJ SC/IM: CPT

## 2023-11-13 RX ORDER — ALBUTEROL SULFATE 0.83 MG/ML
2.5 SOLUTION RESPIRATORY (INHALATION)
OUTPATIENT
Start: 2023-12-11

## 2023-11-13 RX ORDER — HEPARIN SODIUM (PORCINE) LOCK FLUSH IV SOLN 100 UNIT/ML 100 UNIT/ML
5 SOLUTION INTRAVENOUS
OUTPATIENT
Start: 2023-12-11

## 2023-11-13 RX ORDER — EPINEPHRINE 1 MG/ML
0.3 INJECTION, SOLUTION INTRAMUSCULAR; SUBCUTANEOUS EVERY 5 MIN PRN
OUTPATIENT
Start: 2023-12-11

## 2023-11-13 RX ORDER — HEPARIN SODIUM,PORCINE 10 UNIT/ML
5-20 VIAL (ML) INTRAVENOUS DAILY PRN
OUTPATIENT
Start: 2023-12-11

## 2023-11-13 RX ORDER — ALBUTEROL SULFATE 90 UG/1
1-2 AEROSOL, METERED RESPIRATORY (INHALATION)
Start: 2023-12-11

## 2023-11-13 RX ORDER — DIPHENHYDRAMINE HYDROCHLORIDE 50 MG/ML
50 INJECTION INTRAMUSCULAR; INTRAVENOUS
Start: 2023-12-11

## 2023-11-13 RX ORDER — METHYLPREDNISOLONE SODIUM SUCCINATE 125 MG/2ML
125 INJECTION, POWDER, LYOPHILIZED, FOR SOLUTION INTRAMUSCULAR; INTRAVENOUS
Start: 2023-12-11

## 2023-11-13 RX ADMIN — NALTREXONE 380 MG: KIT at 13:34

## 2023-11-13 NOTE — PROGRESS NOTES
Nursing Note  Alistair Huggins [facility]:   Chief Complaint   Patient presents with    Imm/Inj     IM Vivitrol         During [facility]'s [facility] appointment, orders from Regina Joyner NP were completed.  Frequency: monthly    Progress note:  Patient identification verified by name and date of birth.  Assessment completed.  Vitals recorded in Doc Flowsheets.  Patient was provided with education regarding medication/procedure and possible side effects.  Patient verbalized understanding.     present during visit today: Not Applicable.    Treatment Conditions: Non-applicable.    Premedications: were not ordered.    Drug Waste Record: No    Injection site:  (R) Ventrogluteal    Labs: were not ordered for this appointment.    Vascular access: NA    Is the next appt scheduled? Yes    Post Infusion Assessment:  Patient tolerated injection without incident.  Site intact, free from redness, edema or discomfort.  No evidence of extravasations.     Discharge Plan:   Follow up plan of care with: ongoing injections [facility].  Discharge instructions were reviewed with patient.  Patient/representative verbalized understanding of discharge instructions and all questions answered.  Patient discharged [facility] in stable condition.    Laisha Weber RN    Administrations This Visit       naltrexone (VIVITROL) injection 380 mg       Admin Date  11/13/2023 Action  $Given Dose  380 mg Route  Intramuscular Documented By  Laisha Weber RN                    /73 (BP Location: Left arm, Patient Position: Sitting, Cuff Size: Adult Regular)   Pulse 80   Temp 98.4  F (36.9  C) (Oral)   Resp 16   SpO2 100%

## 2024-01-08 ENCOUNTER — INFUSION THERAPY VISIT (OUTPATIENT)
Dept: INFUSION THERAPY | Facility: CLINIC | Age: 44
End: 2024-01-08
Payer: COMMERCIAL

## 2024-01-08 VITALS
DIASTOLIC BLOOD PRESSURE: 86 MMHG | TEMPERATURE: 98.7 F | SYSTOLIC BLOOD PRESSURE: 127 MMHG | OXYGEN SATURATION: 100 % | HEART RATE: 85 BPM | RESPIRATION RATE: 18 BRPM

## 2024-01-08 DIAGNOSIS — F10.20 ALCOHOL USE DISORDER, SEVERE, DEPENDENCE (H): Primary | ICD-10-CM

## 2024-01-08 PROCEDURE — 96372 THER/PROPH/DIAG INJ SC/IM: CPT

## 2024-01-08 PROCEDURE — 250N000011 HC RX IP 250 OP 636

## 2024-01-08 RX ORDER — ALBUTEROL SULFATE 0.83 MG/ML
2.5 SOLUTION RESPIRATORY (INHALATION)
OUTPATIENT
Start: 2024-02-05

## 2024-01-08 RX ORDER — DIPHENHYDRAMINE HYDROCHLORIDE 50 MG/ML
50 INJECTION INTRAMUSCULAR; INTRAVENOUS
Start: 2024-02-05

## 2024-01-08 RX ORDER — ALBUTEROL SULFATE 90 UG/1
1-2 AEROSOL, METERED RESPIRATORY (INHALATION)
Start: 2024-02-05

## 2024-01-08 RX ORDER — METHYLPREDNISOLONE SODIUM SUCCINATE 125 MG/2ML
125 INJECTION, POWDER, LYOPHILIZED, FOR SOLUTION INTRAMUSCULAR; INTRAVENOUS
Start: 2024-02-05

## 2024-01-08 RX ORDER — HEPARIN SODIUM (PORCINE) LOCK FLUSH IV SOLN 100 UNIT/ML 100 UNIT/ML
5 SOLUTION INTRAVENOUS
OUTPATIENT
Start: 2024-02-05

## 2024-01-08 RX ORDER — EPINEPHRINE 1 MG/ML
0.3 INJECTION, SOLUTION INTRAMUSCULAR; SUBCUTANEOUS EVERY 5 MIN PRN
OUTPATIENT
Start: 2024-02-05

## 2024-01-08 RX ORDER — HEPARIN SODIUM,PORCINE 10 UNIT/ML
5-20 VIAL (ML) INTRAVENOUS DAILY PRN
OUTPATIENT
Start: 2024-02-05

## 2024-01-08 RX ADMIN — NALTREXONE 380 MG: KIT at 13:50

## 2024-01-08 ASSESSMENT — PAIN SCALES - GENERAL: PAINLEVEL: NO PAIN (0)

## 2024-01-08 NOTE — PATIENT INSTRUCTIONS
Dear Alistair Huggins    Thank you for choosing [facility] ([facility]) for your infusion.  The following information is a summary of our appointment as well as important reminders.          If you are a transplant patient and require transplant education, please click on this link: [email or web address].    We look forward in seeing you on your next appointment here at [facility] ([facility]).  Please don t hesitate to call us at [phone] to reschedule any of your appointments or to speak with one of the [facility] registered nurses.  It was a pleasure taking care of you today.    Sincerely,    [facility]  [facility]  [address] [place] [address]  [address]  Phone:  [phone]

## 2024-01-08 NOTE — PROGRESS NOTES
Infusion Nursing Note:  Alistair Huggins presents today for vivitrol.    Patient seen by provider today: No   present during visit today: Not Applicable.    Note: N/A.      Intravenous Access:  {UMHIVACCESS:698788}    Treatment Conditions:  {UMHTXCONDITIONS:321247}      Post Infusion Assessment:  {UMHPOSTINFUSION:811557}       Discharge Plan:   {UMHDISCHARGE:892401}      Millie Fernández RN

## 2024-01-08 NOTE — PROGRESS NOTES
Infusion Nursing Note:  Alistair Huggins presents today for Vivitrol.    Patient seen by provider today: No   present during visit today: Not Applicable.    Note: As ordered, Vivitrol given IM on the left Ventrogluteal.    Administrations This Visit       naltrexone (VIVITROL) injection 380 mg       Admin Date  01/08/2024 Action  $Given Dose  380 mg Route  Intramuscular Documented By  Joie Farrar RN                   Intravenous Access:  N/A.    Treatment Conditions:  None.      Post Infusion Assessment:  Patient tolerated injection without incident.  Site patent and intact, free from redness, edema or discomfort.       Discharge Plan:   Discharge instructions reviewed with: Patient.  Patient and/or family verbalized understanding of discharge instructions and all questions answered.  AVS to patient via [email or web address].  Patient will return 2/5/24 for next appointment.   Patient discharged in stable condition accompanied by: self.  Departure Mode: Ambulatory.    /86 (BP Location: Left arm, Patient Position: Sitting, Cuff Size: Adult Regular)   Pulse 85   Temp 98.7  F (37.1  C) (Oral)   Resp 18   SpO2 100%      Joie Farrar RN

## 2024-12-01 ENCOUNTER — HEALTH MAINTENANCE LETTER (OUTPATIENT)
Age: 44
End: 2024-12-01